# Patient Record
Sex: FEMALE | Race: WHITE | Employment: OTHER | URBAN - METROPOLITAN AREA
[De-identification: names, ages, dates, MRNs, and addresses within clinical notes are randomized per-mention and may not be internally consistent; named-entity substitution may affect disease eponyms.]

---

## 2018-04-17 PROBLEM — F41.0 PANIC: Status: ACTIVE | Noted: 2018-04-17

## 2018-04-17 PROBLEM — L82.0 SEBORRHEIC KERATOSES, INFLAMED: Status: ACTIVE | Noted: 2018-04-17

## 2018-04-17 NOTE — PROGRESS NOTES
New patient to the facility. Referred by daughter. She hails from [facility] was a real tear in that area. She is here to establish. Her main problems is that of controlled hypothyroidism and history of panic disorder.   She brings in her

## 2018-04-18 ENCOUNTER — TELEPHONE (OUTPATIENT)
Dept: FAMILY MEDICINE CLINIC | Facility: CLINIC | Age: 83
End: 2018-04-18

## 2018-04-18 DIAGNOSIS — Z12.83 SCREENING FOR MALIGNANT NEOPLASM OF SKIN: Primary | ICD-10-CM

## 2018-04-18 NOTE — TELEPHONE ENCOUNTER
.Reason for the order/referral:Skin Check   PCP:  Delonte De Leon DO  Refer to Provider: Ramakrishna Tijerina  Specialty:Dermatology   Patient Insurance: Payor: [facility] MED ADV HMO HUM / Plan: [id number] [facility] HMO / Product Type: HMO /   Has the patient been seen by their PCP f

## 2018-04-19 NOTE — TELEPHONE ENCOUNTER
Dr. Lonnie Mccann,    [place] [address] just saw this patient on 4/17/2018 for this problem.  Okay for referral?

## 2018-06-26 NOTE — PROGRESS NOTES
Second visit for this woman who has recently moved this this past April from her home in [place]. She has 2 daughters back in that facility and has one here in town. She admits she still a bit homesick.   She is concerned primarily about her desire to

## 2018-06-27 ENCOUNTER — TELEPHONE (OUTPATIENT)
Dept: FAMILY MEDICINE CLINIC | Facility: CLINIC | Age: 83
End: 2018-06-27

## 2018-06-27 NOTE — TELEPHONE ENCOUNTER
Pt had appt with CHANNING yesterday and forgot to request routine lab work as she is due.   pls advise

## 2018-07-10 ENCOUNTER — TELEPHONE (OUTPATIENT)
Dept: FAMILY MEDICINE CLINIC | Facility: CLINIC | Age: 83
End: 2018-07-10

## 2018-07-10 DIAGNOSIS — Z13.29 SCREENING FOR THYROID DISORDER: ICD-10-CM

## 2018-07-10 DIAGNOSIS — Z13.228 SCREENING FOR ENDOCRINE, METABOLIC AND IMMUNITY DISORDER: ICD-10-CM

## 2018-07-10 DIAGNOSIS — Z13.29 SCREENING FOR ENDOCRINE, METABOLIC AND IMMUNITY DISORDER: ICD-10-CM

## 2018-07-10 DIAGNOSIS — Z13.220 SCREENING FOR LIPOID DISORDERS: ICD-10-CM

## 2018-07-10 DIAGNOSIS — Z13.0 SCREENING FOR DEFICIENCY ANEMIA: Primary | ICD-10-CM

## 2018-07-10 DIAGNOSIS — Z13.21 ENCOUNTER FOR VITAMIN DEFICIENCY SCREENING: ICD-10-CM

## 2018-07-10 DIAGNOSIS — Z13.0 SCREENING FOR ENDOCRINE, METABOLIC AND IMMUNITY DISORDER: ICD-10-CM

## 2018-07-10 NOTE — TELEPHONE ENCOUNTER
Pt has and appt with Dr Bonifacio Villanueva on July 30th. She would like to have her blood work done before hand. . She would like a phone call once orders are put in, so she knows when she can come get them done.

## 2018-07-12 ENCOUNTER — OFFICE VISIT (OUTPATIENT)
Dept: FAMILY MEDICINE CLINIC | Facility: CLINIC | Age: 83
End: 2018-07-12

## 2018-07-12 VITALS
TEMPERATURE: 99 F | DIASTOLIC BLOOD PRESSURE: 76 MMHG | OXYGEN SATURATION: 98 % | BODY MASS INDEX: 26.25 KG/M2 | WEIGHT: 142.63 LBS | HEART RATE: 72 BPM | SYSTOLIC BLOOD PRESSURE: 130 MMHG | HEIGHT: 62 IN | RESPIRATION RATE: 16 BRPM

## 2018-07-12 DIAGNOSIS — J30.9 ALLERGIC RHINITIS, UNSPECIFIED SEASONALITY, UNSPECIFIED TRIGGER: Primary | ICD-10-CM

## 2018-07-12 PROCEDURE — 99213 OFFICE O/P EST LOW 20 MIN: CPT | Performed by: NURSE PRACTITIONER

## 2018-07-15 NOTE — PROGRESS NOTES
HPI:     Patient presents with: Other: off and on sore throat,chills,fatigue,swollen glads off and on sx x 4-5 days. Allergy symptoms. The patient complains of allergy symptoms. Has had for 4-5.  Symptoms include: nasal congestion,clear rhinorrhea,na pains.  LUNGS: denies shortness of breath with exertion or rest.No wheezing, no cough.   CARDIOVASCULAR: denies chest pain on exertion  GI: no nausea or abdominal pain  NEURO: no sleeping issues      EXAM:   /76 (BP Location: Right arm, Patient Positi

## 2018-07-16 NOTE — TELEPHONE ENCOUNTER
Per Juan Livingston to pre order    jg (Routing comment)    Per [facility] brittany ORELLANA to order prior to [address] ordered, pt notified.

## 2018-07-16 NOTE — TELEPHONE ENCOUNTER
Patient called and stated that she hasn't heard back regarding lab work that she requested. Please advise.

## 2018-07-19 ENCOUNTER — LABORATORY ENCOUNTER (OUTPATIENT)
Dept: LAB | Age: 83
End: 2018-07-19
Attending: FAMILY MEDICINE
Payer: MEDICARE

## 2018-07-19 DIAGNOSIS — Z13.29 SCREENING FOR ENDOCRINE, METABOLIC AND IMMUNITY DISORDER: ICD-10-CM

## 2018-07-19 DIAGNOSIS — Z13.21 ENCOUNTER FOR VITAMIN DEFICIENCY SCREENING: ICD-10-CM

## 2018-07-19 DIAGNOSIS — Z13.0 SCREENING FOR ENDOCRINE, METABOLIC AND IMMUNITY DISORDER: ICD-10-CM

## 2018-07-19 DIAGNOSIS — Z13.228 SCREENING FOR ENDOCRINE, METABOLIC AND IMMUNITY DISORDER: ICD-10-CM

## 2018-07-19 DIAGNOSIS — Z13.220 SCREENING FOR LIPOID DISORDERS: ICD-10-CM

## 2018-07-19 DIAGNOSIS — Z13.29 SCREENING FOR THYROID DISORDER: ICD-10-CM

## 2018-07-19 DIAGNOSIS — Z13.0 SCREENING FOR DEFICIENCY ANEMIA: ICD-10-CM

## 2018-07-19 LAB
ALBUMIN SERPL-MCNC: 3.5 G/DL (ref 3.5–4.8)
ALBUMIN/GLOB SERPL: 1 {RATIO} (ref 1–2)
ALP LIVER SERPL-CCNC: 67 U/L (ref 55–142)
ALT SERPL-CCNC: 27 U/L (ref 14–54)
ANION GAP SERPL CALC-SCNC: 7 MMOL/L (ref 0–18)
AST SERPL-CCNC: 19 U/L (ref 15–41)
BASOPHILS # BLD AUTO: 0.04 X10(3) UL (ref 0–0.1)
BASOPHILS NFR BLD AUTO: 0.8 %
BILIRUB SERPL-MCNC: 0.5 MG/DL (ref 0.1–2)
BUN BLD-MCNC: 12 MG/DL (ref 8–20)
BUN/CREAT SERPL: 16.4 (ref 10–20)
CALCIUM BLD-MCNC: 9.4 MG/DL (ref 8.3–10.3)
CHLORIDE SERPL-SCNC: 107 MMOL/L (ref 101–111)
CHOLEST SMN-MCNC: 228 MG/DL (ref ?–200)
CO2 SERPL-SCNC: 27 MMOL/L (ref 22–32)
CREAT BLD-MCNC: 0.73 MG/DL (ref 0.55–1.02)
EOSINOPHIL # BLD AUTO: 0.16 X10(3) UL (ref 0–0.3)
EOSINOPHIL NFR BLD AUTO: 3.3 %
ERYTHROCYTE [DISTWIDTH] IN BLOOD BY AUTOMATED COUNT: 12.6 % (ref 11.5–16)
GLOBULIN PLAS-MCNC: 3.4 G/DL (ref 2.5–3.7)
GLUCOSE BLD-MCNC: 96 MG/DL (ref 70–99)
HCT VFR BLD AUTO: 39.9 % (ref 34–50)
HDLC SERPL-MCNC: 60 MG/DL (ref 45–?)
HDLC SERPL: 3.8 {RATIO} (ref ?–4.44)
HGB BLD-MCNC: 12.9 G/DL (ref 12–16)
IMMATURE GRANULOCYTE COUNT: 0.01 X10(3) UL (ref 0–1)
IMMATURE GRANULOCYTE RATIO %: 0.2 %
LDLC SERPL CALC-MCNC: 146 MG/DL (ref ?–130)
LYMPHOCYTES # BLD AUTO: 1.4 X10(3) UL (ref 0.9–4)
LYMPHOCYTES NFR BLD AUTO: 29 %
M PROTEIN MFR SERPL ELPH: 6.9 G/DL (ref 6.1–8.3)
MCH RBC QN AUTO: 28 PG (ref 27–33.2)
MCHC RBC AUTO-ENTMCNC: 32.3 G/DL (ref 31–37)
MCV RBC AUTO: 86.7 FL (ref 81–100)
MONOCYTES # BLD AUTO: 0.35 X10(3) UL (ref 0.1–1)
MONOCYTES NFR BLD AUTO: 7.3 %
NEUTROPHIL ABS PRELIM: 2.86 X10 (3) UL (ref 1.3–6.7)
NEUTROPHILS # BLD AUTO: 2.86 X10(3) UL (ref 1.3–6.7)
NEUTROPHILS NFR BLD AUTO: 59.4 %
NONHDLC SERPL-MCNC: 168 MG/DL (ref ?–130)
OSMOLALITY SERPL CALC.SUM OF ELEC: 292 MOSM/KG (ref 275–295)
PLATELET # BLD AUTO: 188 10(3)UL (ref 150–450)
POTASSIUM SERPL-SCNC: 4.3 MMOL/L (ref 3.6–5.1)
RBC # BLD AUTO: 4.6 X10(6)UL (ref 3.8–5.1)
RED CELL DISTRIBUTION WIDTH-SD: 39.8 FL (ref 35.1–46.3)
SODIUM SERPL-SCNC: 141 MMOL/L (ref 136–144)
TRIGL SERPL-MCNC: 111 MG/DL (ref ?–150)
TSI SER-ACNC: 1.1 MIU/ML (ref 0.35–5.5)
VIT D+METAB SERPL-MCNC: 21.9 NG/ML (ref 30–100)
VLDLC SERPL CALC-MCNC: 22 MG/DL (ref 5–40)
WBC # BLD AUTO: 4.8 X10(3) UL (ref 4–13)

## 2018-07-19 PROCEDURE — 82306 VITAMIN D 25 HYDROXY: CPT

## 2018-07-19 PROCEDURE — 84443 ASSAY THYROID STIM HORMONE: CPT

## 2018-07-19 PROCEDURE — 80053 COMPREHEN METABOLIC PANEL: CPT

## 2018-07-19 PROCEDURE — 85025 COMPLETE CBC W/AUTO DIFF WBC: CPT

## 2018-07-19 PROCEDURE — 80061 LIPID PANEL: CPT

## 2018-07-19 PROCEDURE — 36415 COLL VENOUS BLD VENIPUNCTURE: CPT

## 2018-07-30 ENCOUNTER — OFFICE VISIT (OUTPATIENT)
Dept: FAMILY MEDICINE CLINIC | Facility: CLINIC | Age: 83
End: 2018-07-30

## 2018-07-30 VITALS
OXYGEN SATURATION: 97 % | HEART RATE: 92 BPM | WEIGHT: 142 LBS | HEIGHT: 62 IN | SYSTOLIC BLOOD PRESSURE: 118 MMHG | BODY MASS INDEX: 26.13 KG/M2 | RESPIRATION RATE: 16 BRPM | DIASTOLIC BLOOD PRESSURE: 80 MMHG | TEMPERATURE: 98 F

## 2018-07-30 DIAGNOSIS — H90.3 SENSORINEURAL HEARING LOSS (SNHL) OF BOTH EARS: Primary | ICD-10-CM

## 2018-07-30 PROCEDURE — 96160 PT-FOCUSED HLTH RISK ASSMT: CPT | Performed by: FAMILY MEDICINE

## 2018-07-30 PROCEDURE — G0438 PPPS, INITIAL VISIT: HCPCS | Performed by: FAMILY MEDICINE

## 2018-07-30 NOTE — PROGRESS NOTES
Presents for Medicare supervision. To be recalled that she is relatively new to our state having moved here from [place]. She had been in a 15 year relationship with a man but he passed away. Please refer to the template form.     Tuning fork was ap

## 2018-08-06 ENCOUNTER — HOSPITAL ENCOUNTER (OUTPATIENT)
Dept: GENERAL RADIOLOGY | Facility: HOSPITAL | Age: 83
Discharge: HOME | End: 2018-08-06
Attending: FAMILY MEDICINE
Payer: MEDICARE

## 2018-08-06 DIAGNOSIS — M25.551 HIP PAIN, BILATERAL: ICD-10-CM

## 2018-08-06 DIAGNOSIS — M25.552 HIP PAIN, BILATERAL: ICD-10-CM

## 2018-08-06 PROCEDURE — 73523 X-RAY EXAM HIPS BI 5/> VIEWS: CPT | Performed by: FAMILY MEDICINE

## 2018-08-06 PROCEDURE — 72190 X-RAY EXAM OF PELVIS: CPT | Performed by: FAMILY MEDICINE

## 2018-09-05 ENCOUNTER — HOSPITAL ENCOUNTER (OUTPATIENT)
Dept: GENERAL RADIOLOGY | Age: 83
Discharge: HOME | End: 2018-09-05
Attending: FAMILY MEDICINE
Payer: MEDICARE

## 2018-09-05 ENCOUNTER — OFFICE VISIT (OUTPATIENT)
Dept: FAMILY MEDICINE CLINIC | Facility: CLINIC | Age: 83
End: 2018-09-05

## 2018-09-05 VITALS
HEART RATE: 62 BPM | SYSTOLIC BLOOD PRESSURE: 116 MMHG | DIASTOLIC BLOOD PRESSURE: 80 MMHG | TEMPERATURE: 98 F | HEIGHT: 62 IN | BODY MASS INDEX: 25.76 KG/M2 | RESPIRATION RATE: 20 BRPM | WEIGHT: 140 LBS | OXYGEN SATURATION: 99 %

## 2018-09-05 DIAGNOSIS — S99.911A INJURY OF RIGHT ANKLE, INITIAL ENCOUNTER: ICD-10-CM

## 2018-09-05 DIAGNOSIS — S99.911A INJURY OF RIGHT ANKLE, INITIAL ENCOUNTER: Primary | ICD-10-CM

## 2018-09-05 PROCEDURE — 73630 X-RAY EXAM OF FOOT: CPT | Performed by: FAMILY MEDICINE

## 2018-09-05 PROCEDURE — 73610 X-RAY EXAM OF ANKLE: CPT | Performed by: FAMILY MEDICINE

## 2018-09-05 PROCEDURE — 99213 OFFICE O/P EST LOW 20 MIN: CPT | Performed by: FAMILY MEDICINE

## 2018-09-05 NOTE — PROGRESS NOTES
HPI:    Patient ID: Jos Dexter is a 80year old female. Ankle Injury    The incident occurred more than 1 week ago (8/23). Incident location: pt rolled her ankle on the side walk. The injury mechanism was an inversion injury.  The pain is present in t Comment:Throat swelling  Synalgos Dc               Ngozi [Cefaclor]           Comment:Headache  Imodium [Loperamide]      Donnatal [Belladonn*      Gantrisin [Sulfisox*      Lorabid [Loracarbef]      Monistat [Miconazol*      Sudafed [Pseudoephe* Walking boot up to the knee for right leg.            Imaging & Referrals:  XR ANKLE (MIN 3 VIEWS), RIGHT (CPT=73610)  XR FOOT, COMPLETE (MIN 3 VIEWS), RIGHT (CPT=73630)       NA#[id number]

## 2018-09-11 ENCOUNTER — OFFICE VISIT (OUTPATIENT)
Dept: FAMILY MEDICINE CLINIC | Facility: CLINIC | Age: 83
End: 2018-09-11

## 2018-09-11 VITALS
BODY MASS INDEX: 26.31 KG/M2 | TEMPERATURE: 99 F | HEART RATE: 65 BPM | RESPIRATION RATE: 16 BRPM | WEIGHT: 143 LBS | SYSTOLIC BLOOD PRESSURE: 126 MMHG | OXYGEN SATURATION: 97 % | HEIGHT: 62 IN | DIASTOLIC BLOOD PRESSURE: 78 MMHG

## 2018-09-11 DIAGNOSIS — J06.9 ACUTE URI: Primary | ICD-10-CM

## 2018-09-11 PROCEDURE — 99213 OFFICE O/P EST LOW 20 MIN: CPT | Performed by: NURSE PRACTITIONER

## 2018-09-11 RX ORDER — BENZONATATE 200 MG/1
200 CAPSULE ORAL 3 TIMES DAILY PRN
Qty: 20 CAPSULE | Refills: 0 | Status: SHIPPED | OUTPATIENT
Start: 2018-09-11 | End: 2018-09-18

## 2018-09-11 NOTE — PROGRESS NOTES
Patient presents with:  URI: cough sx x 2-3 days. HPI:   Mari Farrar is a 80year old female who presents for upper respiratory symptoms for  3  days.  Patient reports sore throat only at the beginning of sx's, congestion, dry cough, cough is keepin rest.  CARDIOVASCULAR: denies chest pain on exertion  GI: no nausea or abdominal pain      EXAM:   /78 (BP Location: Right arm, Patient Position: Sitting, Cuff Size: adult)   Pulse 65   Temp 99 °F (37.2 °C) (Oral)   Resp 16   Ht 62\"   Wt 143 lb   Sp

## 2018-10-30 ENCOUNTER — OFFICE VISIT (OUTPATIENT)
Dept: FAMILY MEDICINE CLINIC | Facility: CLINIC | Age: 83
End: 2018-10-30

## 2018-10-30 VITALS
TEMPERATURE: 98 F | RESPIRATION RATE: 16 BRPM | OXYGEN SATURATION: 98 % | WEIGHT: 145 LBS | DIASTOLIC BLOOD PRESSURE: 72 MMHG | SYSTOLIC BLOOD PRESSURE: 124 MMHG | HEIGHT: 62 IN | HEART RATE: 72 BPM | BODY MASS INDEX: 26.68 KG/M2

## 2018-10-30 DIAGNOSIS — M54.12 CERVICAL RADICULOPATHY: ICD-10-CM

## 2018-10-30 DIAGNOSIS — M54.16 LUMBAR RADICULOPATHY: Primary | ICD-10-CM

## 2018-10-30 DIAGNOSIS — L82.0 SEBORRHEIC KERATOSES, INFLAMED: ICD-10-CM

## 2018-10-30 PROCEDURE — 99213 OFFICE O/P EST LOW 20 MIN: CPT | Performed by: FAMILY MEDICINE

## 2018-10-30 NOTE — PROGRESS NOTES
Requests a referral to chiropractor recently was struck by a large commercial door to the back aggravating her chronic low back pain on today's exam however her gait is normal there is no palpable pain neurologically she is intact no bruising is noted next

## 2018-11-08 ENCOUNTER — TELEPHONE (OUTPATIENT)
Dept: FAMILY MEDICINE CLINIC | Facility: CLINIC | Age: 83
End: 2018-11-08

## 2018-11-08 NOTE — TELEPHONE ENCOUNTER
Regarding: RE: Other      ----- Message -----  From: Diogo Healy RN  Sent: 11/7/2018   2:26 PM  To: Romina Ac DO  Subject: RE: Other                                        ----- Message from Terrie Gaucher, RN sent at 11/7/2018  2:26 PM CST ---

## 2019-01-20 ENCOUNTER — HOSPITAL ENCOUNTER (INPATIENT)
Facility: HOSPITAL | Age: 84
LOS: 2 days | Discharge: HOME | DRG: 069 | End: 2019-01-22
Attending: EMERGENCY MEDICINE | Admitting: HOSPITALIST
Payer: MEDICARE

## 2019-01-20 ENCOUNTER — APPOINTMENT (OUTPATIENT)
Dept: CT IMAGING | Facility: HOSPITAL | Age: 84
DRG: 069 | End: 2019-01-20
Attending: EMERGENCY MEDICINE
Payer: MEDICARE

## 2019-01-20 DIAGNOSIS — R41.82 ALTERED MENTAL STATUS, UNSPECIFIED ALTERED MENTAL STATUS TYPE: Primary | ICD-10-CM

## 2019-01-20 DIAGNOSIS — I10 HYPERTENSION, UNSPECIFIED TYPE: ICD-10-CM

## 2019-01-20 DIAGNOSIS — I63.10 CEREBROVASCULAR ACCIDENT (CVA) DUE TO EMBOLISM OF PRECEREBRAL ARTERY (HCC): ICD-10-CM

## 2019-01-20 DIAGNOSIS — R47.01 APHASIA: ICD-10-CM

## 2019-01-20 PROBLEM — F41.9 ANXIETY: Chronic | Status: ACTIVE | Noted: 2019-01-20

## 2019-01-20 PROBLEM — E03.9 ACQUIRED HYPOTHYROIDISM: Status: ACTIVE | Noted: 2019-01-20

## 2019-01-20 LAB
ALBUMIN SERPL-MCNC: 3.5 G/DL (ref 3.1–4.5)
ALBUMIN/GLOB SERPL: 1 {RATIO} (ref 1–2)
ALP LIVER SERPL-CCNC: 67 U/L (ref 55–142)
ALT SERPL-CCNC: 22 U/L (ref 14–54)
ANION GAP SERPL CALC-SCNC: 7 MMOL/L (ref 0–18)
APTT PPP: 35.4 SECONDS (ref 26.1–34.6)
AST SERPL-CCNC: 19 U/L (ref 15–41)
BASOPHILS # BLD AUTO: 0.05 X10(3) UL (ref 0–0.1)
BASOPHILS NFR BLD AUTO: 0.9 %
BILIRUB SERPL-MCNC: 0.4 MG/DL (ref 0.1–2)
BILIRUB UR QL STRIP.AUTO: NEGATIVE
BUN BLD-MCNC: 12 MG/DL (ref 8–20)
BUN/CREAT SERPL: 16.9 (ref 10–20)
CALCIUM BLD-MCNC: 9.1 MG/DL (ref 8.3–10.3)
CHLORIDE SERPL-SCNC: 107 MMOL/L (ref 101–111)
CLARITY UR REFRACT.AUTO: CLEAR
CO2 SERPL-SCNC: 26 MMOL/L (ref 22–32)
COLOR UR AUTO: COLORLESS
CREAT BLD-MCNC: 0.71 MG/DL (ref 0.55–1.02)
EOSINOPHIL # BLD AUTO: 0.17 X10(3) UL (ref 0–0.3)
EOSINOPHIL NFR BLD AUTO: 3.1 %
ERYTHROCYTE [DISTWIDTH] IN BLOOD BY AUTOMATED COUNT: 12.2 % (ref 11.5–16)
EST. AVERAGE GLUCOSE BLD GHB EST-MCNC: 120 MG/DL (ref 68–126)
GLOBULIN PLAS-MCNC: 3.4 G/DL (ref 2.8–4.4)
GLUCOSE BLD-MCNC: 112 MG/DL (ref 70–99)
GLUCOSE BLD-MCNC: 114 MG/DL (ref 65–99)
GLUCOSE BLD-MCNC: 117 MG/DL (ref 65–99)
GLUCOSE UR STRIP.AUTO-MCNC: NEGATIVE MG/DL
HBA1C MFR BLD HPLC: 5.8 % (ref ?–5.7)
HCT VFR BLD AUTO: 39.8 % (ref 34–50)
HGB BLD-MCNC: 13.8 G/DL (ref 12–16)
IMMATURE GRANULOCYTE COUNT: 0.01 X10(3) UL (ref 0–1)
IMMATURE GRANULOCYTE RATIO %: 0.2 %
INR BLD: 0.9 (ref 0.9–1.1)
INR BLD: 0.98 (ref 0.9–1.1)
KETONES UR STRIP.AUTO-MCNC: NEGATIVE MG/DL
LYMPHOCYTES # BLD AUTO: 1.26 X10(3) UL (ref 0.9–4)
LYMPHOCYTES NFR BLD AUTO: 22.6 %
M PROTEIN MFR SERPL ELPH: 6.9 G/DL (ref 6.4–8.2)
MCH RBC QN AUTO: 29.2 PG (ref 27–33.2)
MCHC RBC AUTO-ENTMCNC: 34.7 G/DL (ref 31–37)
MCV RBC AUTO: 84.1 FL (ref 81–100)
MONOCYTES # BLD AUTO: 0.36 X10(3) UL (ref 0.1–1)
MONOCYTES NFR BLD AUTO: 6.5 %
NEUTROPHIL ABS PRELIM: 3.72 X10 (3) UL (ref 1.3–6.7)
NEUTROPHILS # BLD AUTO: 3.72 X10(3) UL (ref 1.3–6.7)
NEUTROPHILS NFR BLD AUTO: 66.7 %
NITRITE UR QL STRIP.AUTO: NEGATIVE
OSMOLALITY SERPL CALC.SUM OF ELEC: 291 MOSM/KG (ref 275–295)
PH UR STRIP.AUTO: 9 [PH] (ref 4.5–8)
PLATELET # BLD AUTO: 169 10(3)UL (ref 150–450)
POTASSIUM SERPL-SCNC: 3.7 MMOL/L (ref 3.6–5.1)
PROT UR STRIP.AUTO-MCNC: NEGATIVE MG/DL
PSA SERPL DL<=0.01 NG/ML-MCNC: 12.5 SECONDS (ref 12.4–14.7)
PSA SERPL DL<=0.01 NG/ML-MCNC: 13.4 SECONDS (ref 12.4–14.7)
RBC # BLD AUTO: 4.73 X10(6)UL (ref 3.8–5.1)
RBC UR QL AUTO: NEGATIVE
RED CELL DISTRIBUTION WIDTH-SD: 37.5 FL (ref 35.1–46.3)
SODIUM SERPL-SCNC: 140 MMOL/L (ref 136–144)
SP GR UR STRIP.AUTO: 1.01 (ref 1–1.03)
T4 FREE SERPL-MCNC: 0.9 NG/DL (ref 0.9–1.8)
TROPONIN I SERPL-MCNC: <0.046 NG/ML (ref ?–0.05)
TSI SER-ACNC: 0.93 MIU/ML (ref 0.35–5.5)
UROBILINOGEN UR STRIP.AUTO-MCNC: <2 MG/DL
WBC # BLD AUTO: 5.6 X10(3) UL (ref 4–13)

## 2019-01-20 PROCEDURE — 99223 1ST HOSP IP/OBS HIGH 75: CPT | Performed by: HOSPITALIST

## 2019-01-20 PROCEDURE — 70450 CT HEAD/BRAIN W/O DYE: CPT | Performed by: EMERGENCY MEDICINE

## 2019-01-20 PROCEDURE — 70496 CT ANGIOGRAPHY HEAD: CPT | Performed by: EMERGENCY MEDICINE

## 2019-01-20 PROCEDURE — 70498 CT ANGIOGRAPHY NECK: CPT | Performed by: EMERGENCY MEDICINE

## 2019-01-20 PROCEDURE — 0042T CT STROKE(DAWN BRAIN)CTA BRAIN/CTA NECK+PERF(CPT=70496/70498/0042T): CPT | Performed by: EMERGENCY MEDICINE

## 2019-01-20 RX ORDER — ACETAMINOPHEN 325 MG/1
650 TABLET ORAL EVERY 4 HOURS PRN
Status: DISCONTINUED | OUTPATIENT
Start: 2019-01-20 | End: 2019-01-22

## 2019-01-20 RX ORDER — HEPARIN SODIUM 5000 [USP'U]/ML
5000 INJECTION, SOLUTION INTRAVENOUS; SUBCUTANEOUS EVERY 8 HOURS SCHEDULED
Status: DISCONTINUED | OUTPATIENT
Start: 2019-01-20 | End: 2019-01-22

## 2019-01-20 RX ORDER — METOCLOPRAMIDE HYDROCHLORIDE 5 MG/ML
10 INJECTION INTRAMUSCULAR; INTRAVENOUS EVERY 8 HOURS PRN
Status: DISCONTINUED | OUTPATIENT
Start: 2019-01-20 | End: 2019-01-22

## 2019-01-20 RX ORDER — LORAZEPAM 2 MG/ML
1 INJECTION INTRAMUSCULAR ONCE
Status: COMPLETED | OUTPATIENT
Start: 2019-01-20 | End: 2019-01-20

## 2019-01-20 RX ORDER — FAMOTIDINE 20 MG/1
20 TABLET ORAL DAILY
Status: DISCONTINUED | OUTPATIENT
Start: 2019-01-20 | End: 2019-01-22

## 2019-01-20 RX ORDER — HYDRALAZINE HYDROCHLORIDE 20 MG/ML
10 INJECTION INTRAMUSCULAR; INTRAVENOUS ONCE
Status: DISCONTINUED | OUTPATIENT
Start: 2019-01-20 | End: 2019-01-22

## 2019-01-20 RX ORDER — ASPIRIN 300 MG
300 SUPPOSITORY, RECTAL RECTAL DAILY
Status: DISCONTINUED | OUTPATIENT
Start: 2019-01-20 | End: 2019-01-22

## 2019-01-20 RX ORDER — BISACODYL 10 MG
10 SUPPOSITORY, RECTAL RECTAL
Status: DISCONTINUED | OUTPATIENT
Start: 2019-01-20 | End: 2019-01-22

## 2019-01-20 RX ORDER — LORAZEPAM 2 MG/ML
0.5 INJECTION INTRAMUSCULAR EVERY 6 HOURS PRN
Status: DISCONTINUED | OUTPATIENT
Start: 2019-01-20 | End: 2019-01-22

## 2019-01-20 RX ORDER — ONDANSETRON 2 MG/ML
4 INJECTION INTRAMUSCULAR; INTRAVENOUS EVERY 6 HOURS PRN
Status: DISCONTINUED | OUTPATIENT
Start: 2019-01-20 | End: 2019-01-22

## 2019-01-20 RX ORDER — FAMOTIDINE 10 MG/ML
20 INJECTION, SOLUTION INTRAVENOUS DAILY
Status: DISCONTINUED | OUTPATIENT
Start: 2019-01-20 | End: 2019-01-22

## 2019-01-20 RX ORDER — ASPIRIN 325 MG
325 TABLET ORAL ONCE
Status: COMPLETED | OUTPATIENT
Start: 2019-01-20 | End: 2019-01-20

## 2019-01-20 RX ORDER — ASPIRIN 325 MG
325 TABLET ORAL DAILY
Status: DISCONTINUED | OUTPATIENT
Start: 2019-01-20 | End: 2019-01-22

## 2019-01-20 RX ORDER — ACETAMINOPHEN 650 MG/1
650 SUPPOSITORY RECTAL EVERY 4 HOURS PRN
Status: DISCONTINUED | OUTPATIENT
Start: 2019-01-20 | End: 2019-01-22

## 2019-01-20 RX ORDER — POLYETHYLENE GLYCOL 3350 17 G/17G
17 POWDER, FOR SOLUTION ORAL DAILY PRN
Status: DISCONTINUED | OUTPATIENT
Start: 2019-01-20 | End: 2019-01-22

## 2019-01-20 RX ORDER — SODIUM PHOSPHATE, DIBASIC AND SODIUM PHOSPHATE, MONOBASIC 7; 19 G/133ML; G/133ML
1 ENEMA RECTAL ONCE AS NEEDED
Status: DISCONTINUED | OUTPATIENT
Start: 2019-01-20 | End: 2019-01-22

## 2019-01-20 RX ORDER — ATORVASTATIN CALCIUM 80 MG/1
80 TABLET, FILM COATED ORAL NIGHTLY
Status: DISCONTINUED | OUTPATIENT
Start: 2019-01-20 | End: 2019-01-22

## 2019-01-20 RX ORDER — DOCUSATE SODIUM 100 MG/1
100 CAPSULE, LIQUID FILLED ORAL 2 TIMES DAILY
Status: DISCONTINUED | OUTPATIENT
Start: 2019-01-20 | End: 2019-01-22

## 2019-01-20 RX ORDER — SENNOSIDES 8.6 MG
17.2 TABLET ORAL NIGHTLY
Status: DISCONTINUED | OUTPATIENT
Start: 2019-01-20 | End: 2019-01-22

## 2019-01-20 RX ORDER — POTASSIUM CHLORIDE 20 MEQ/1
40 TABLET, EXTENDED RELEASE ORAL ONCE
Status: COMPLETED | OUTPATIENT
Start: 2019-01-20 | End: 2019-01-20

## 2019-01-20 RX ORDER — LABETALOL HYDROCHLORIDE 5 MG/ML
10 INJECTION, SOLUTION INTRAVENOUS EVERY 10 MIN PRN
Status: DISCONTINUED | OUTPATIENT
Start: 2019-01-20 | End: 2019-01-22

## 2019-01-20 RX ORDER — SODIUM CHLORIDE 9 MG/ML
INJECTION, SOLUTION INTRAVENOUS CONTINUOUS
Status: ACTIVE | OUTPATIENT
Start: 2019-01-20 | End: 2019-01-22

## 2019-01-20 NOTE — H&P
[facility]  History and Physical     Osmanywill Sotelo Patient Status:  Emergency    1935 MRN [id number]   Location [address] [facility] Attending Arianne Arriaga MD   Hosp Day # 0 PCP Dharmesh Mares DO     Chief Complaint: A Latex                     Mayonaise                 Medrol [Methylpredn*      Olive Oil                 Paxil [Paroxetine]        Pyridin [Phenazopyr*      Sulfamethoxazole            Comment:Throat swelling  Synalgos Dc               Ngozi [Cefaclor] completed. Pertinent positives and negatives noted in the HPI.     Physical Exam:    /81   Pulse 96   Temp 98 °F (36.7 °C) (Temporal)   Resp 22   Ht 5' 5\" (1.651 m)   Wt 145 lb 1 oz (65.8 kg)   SpO2 99%   BMI 24.14 kg/m²   General: No acute distre medication for now. If patient is going to be nothing by mouth for prolonged will switch to IV formulation. Otherwise will restart once passes swallow evaluation. 3. Anxiety-we will give so IV Ativan as needed.   4. Depression-we will restart her Lexapro

## 2019-01-20 NOTE — ED INITIAL ASSESSMENT (HPI)
Patient arrives from home, independent living area, by ems. Unsure as to why patient called ems. Patient confused but alert, having a difficult time answering questions but following simple commands.  Tearful and anxious upon arrival. Unsure of last known n

## 2019-01-20 NOTE — ED NOTES
Patient able to sit up and drink water, passed dysphagia screen and tolerated oral aspirin dose. Daughter remains at bedside. Patient continues to follow simple commands but still having trouble answering questions appropriately and forming words.  Patient

## 2019-01-20 NOTE — ED NOTES
Patient remains calm at this time, resting with eyes closed. Family member at bedside. No distress observed. When awake patient still trying to form words and having a hard time. Continues to move all extremities. Observed turning herself over in bed.

## 2019-01-20 NOTE — ED NOTES
Patient waiting for transport at this time. Daughter remains at bedside. Patient resting with eyes closed under warm blankets. Will respond to name and follow simple commands, continues to have difficult time answering questions and verbalizing.  Difficult

## 2019-01-20 NOTE — PROGRESS NOTES
CODE STROKE NOTE:    Responded to Code Stroke paged in ED C1 at 635.    80year old female presents to ED via EMS for AMS and inability to speak.     Per EMS when they arrived on the scene, patient was able to open her door to let them in but was unable t

## 2019-01-20 NOTE — ED NOTES
Patient calm at this time, resting comfortably on her side. Family remains at bedside. VSS. BP stable at this time, will hold hydralazine per MD and continue to monitor. Patient has been assisted onto bedpan and urine specimen collected.

## 2019-01-20 NOTE — ED NOTES
Patient taken to CT department at this time by cart by RN with monitor. [facility] in progress.

## 2019-01-20 NOTE — ED NOTES
Patient and family members remain updated with results and plan of care. Plan for admission. Will continue to monitor VS closely. Slightly calmer at this time, still observed shaking intermittently but anxiety decreasing slightly.  Still laying on side comf

## 2019-01-20 NOTE — ED PROVIDER NOTES
Patient Seen in: [facility] Emergency Department    History   Patient presents with:  Fatigue (constitutional, neurologic)  Altered Mental Status (neurologic)    Stated Complaint: AMS. weakness    HPI    61-year-old female brought in by EMS.   EMS re clear, mucous membranes moist   Neck: supple, no rigidity   Lungs: good air exchange and clear   Heart: regular rate rhythm and no murmur   Abdomen: Soft and nontender. No abdominal masses.   No peritoneal signs   Extremities: no edema, normal peripheral p the individual orders. RAINBOW DRAW BLUE   RAINBOW DRAW LAVENDER   RAINBOW DRAW LIGHT GREEN   RAINBOW DRAW GOLD   URINE CULTURE, ROUTINE   CBC W/ DIFFERENTIAL     EKG    Rate, intervals and axes as noted on EKG Report.   Rate: 90  Rhythm: Sinus Rhythm  Re axial sections were obtained per stroke protocol. Arterial and venous structures were selected for purposes of brain perfusion mapping. Dose reduction techniques were used.  Dose information is transmitted to the  [facility]  of Radiology) Pavan Buckner 35 (N blood volume protocol. CONCLUSION:  No perfusion defect identified. Unless there is contraindication to MRI, consider MRI to determine if there is infarct present, as sometimes this can be detected by MRI, but not evident on a CT perfusion study.     Dic sections were obtained per stroke protocol. Arterial and venous structures were selected for purposes of brain perfusion mapping. Dose reduction techniques were used.  Dose information is transmitted to the [facility]  of Radiology) Pavan Aguilar work.          Disposition and Plan     Clinical Impression:  Altered mental status, unspecified altered mental status type  (primary encounter diagnosis)  Hypertension, unspecified type  Aphasia    Disposition:  Admit  1/20/2019  2:37 pm    Follow-up:  No

## 2019-01-20 NOTE — ED NOTES
Patient has returned from [address] with this RN. Daughter at bedside. Patient sometimes able to follow simple commands but not consistent with her responses.  Will be heard saying she is hungry, speaking clearly intermittently but unable to identify objects appro

## 2019-01-20 NOTE — ED NOTES
Spoke with pts daughter. Daughter spoke with mother 1hr ago, mother was [place] difficulty speaking\" at that time. Pt called 911 after conversation. Daughter states last spoke to mother and all was \"normal\" last night. MD notified.

## 2019-01-21 ENCOUNTER — APPOINTMENT (OUTPATIENT)
Dept: CV DIAGNOSTICS | Facility: HOSPITAL | Age: 84
DRG: 069 | End: 2019-01-21
Attending: HOSPITALIST
Payer: MEDICARE

## 2019-01-21 LAB
ATRIAL RATE: 90 BPM
CHOLEST SMN-MCNC: 220 MG/DL (ref ?–200)
GLUCOSE BLD-MCNC: 100 MG/DL (ref 65–99)
GLUCOSE BLD-MCNC: 110 MG/DL (ref 65–99)
GLUCOSE BLD-MCNC: 86 MG/DL (ref 65–99)
GLUCOSE BLD-MCNC: 99 MG/DL (ref 65–99)
HDLC SERPL-MCNC: 54 MG/DL (ref 40–59)
LDLC SERPL CALC-MCNC: 136 MG/DL (ref ?–100)
NONHDLC SERPL-MCNC: 166 MG/DL (ref ?–130)
P AXIS: 69 DEGREES
P-R INTERVAL: 148 MS
POTASSIUM SERPL-SCNC: 3.7 MMOL/L (ref 3.6–5.1)
POTASSIUM SERPL-SCNC: 4.6 MMOL/L (ref 3.6–5.1)
Q-T INTERVAL: 366 MS
QRS DURATION: 78 MS
QTC CALCULATION (BEZET): 447 MS
R AXIS: 18 DEGREES
T AXIS: 23 DEGREES
TRIGL SERPL-MCNC: 150 MG/DL (ref 30–149)
VENTRICULAR RATE: 90 BPM
VLDLC SERPL CALC-MCNC: 30 MG/DL (ref 0–30)

## 2019-01-21 PROCEDURE — 93306 TTE W/DOPPLER COMPLETE: CPT | Performed by: HOSPITALIST

## 2019-01-21 PROCEDURE — 99232 SBSQ HOSP IP/OBS MODERATE 35: CPT | Performed by: HOSPITALIST

## 2019-01-21 PROCEDURE — 95816 EEG AWAKE AND DROWSY: CPT | Performed by: OTHER

## 2019-01-21 PROCEDURE — 99223 1ST HOSP IP/OBS HIGH 75: CPT | Performed by: OTHER

## 2019-01-21 RX ORDER — LEVOTHYROXINE SODIUM 0.05 MG/1
50 TABLET ORAL
Status: DISCONTINUED | OUTPATIENT
Start: 2019-01-21 | End: 2019-01-22

## 2019-01-21 RX ORDER — ESCITALOPRAM OXALATE 10 MG/1
10 TABLET ORAL EVERY MORNING
Status: DISCONTINUED | OUTPATIENT
Start: 2019-01-21 | End: 2019-01-22

## 2019-01-21 RX ORDER — POTASSIUM CHLORIDE 20 MEQ/1
40 TABLET, EXTENDED RELEASE ORAL ONCE
Status: COMPLETED | OUTPATIENT
Start: 2019-01-21 | End: 2019-01-21

## 2019-01-21 NOTE — CONSULTS
[id number] [facility] Consultation    Date of consult: 1/21/2019    Reason for consult: AMS, speech difficulty    HPI: Sherice Coyle is a 80year old female with past medical history of anxiety and hypothyroidism presents emergency room with [facility] (DULCOLAX) rectal suppository 10 mg 10 mg Rectal Daily PRN   FLEET ENEMA (FLEET) 7-19 GM/118ML enema 133 mL 1 enema Rectal Once PRN   ondansetron HCl (ZOFRAN) injection 4 mg 4 mg Intravenous Q6H PRN   Or      Metoclopramide HCl (REGLAN) injection 10 mg 10 Relation Age of Onset   • Heart Disorder Father    • Stroke Mother    • Other (Down's Syndrome) Sister       Physical Examination:  /64 (BP Location: Right arm)   Pulse 97   Temp 98.2 °F (36.8 °C) (Oral)   Resp 18   Ht 65\"   Wt 145 lb 1 oz   SpO2 98 events for a better cerebral perfusion   mg  Cont liptor  EEG  PT/OT/speech  I will f/u and further recommendations to follow. I explained to pt at length re: assessment and management plan, pt verbalized understanding.     Doug Velazquez,

## 2019-01-21 NOTE — PHYSICAL THERAPY NOTE
PHYSICAL THERAPY EVALUATION - INPATIENT     Room Number: 6074/1319-A  Evaluation Date: 1/21/2019  Type of Evaluation: Initial  Physician Order: PT Eval and Treat    Presenting Problem: AMS, r/o CVA  Reason for Therapy: Mobility Dysfunction and Discha ASSESSMENT  Upper extremity ROM and strength are within functional limits     Lower extremity ROM is within functional limits     Lower extremity strength is within functional limits     BALANCE  Static Sitting: Fair +  Dynamic Sitting: Fair  Static Standi in chair;Needs met;Call light within reach;RN aware of session/findings; All patient questions and concerns addressed;SCDs in place; Alarm set    ASSESSMENT   Patient is a 80year old female admitted on 1/20/2019 for AMS, aphasia. Per Neuro, likely TIA.  Per

## 2019-01-21 NOTE — OCCUPATIONAL THERAPY NOTE
OCCUPATIONAL THERAPY EVALUATION - INPATIENT     Room Number: 7433/2974-E  Evaluation Date: 1/21/2019  Type of Evaluation: Initial  Presenting Problem: aphasia    Physician Order: IP Consult to Occupational Therapy  Reason for Therapy: ADL/IADL Dysfunction repetition  Initiation: cues to initiate tasks  Awareness of Deficits:  decreased awareness of deficits  Problem Solving:  assistance required to generate solutions and assistance required to implement solutions    VISION  Current Vision: no visual deficit ASSESSMENT  Supine to Sit : Modified independent  Sit to Stand: Supervision    Skilled Therapy Provided: modified independent with supine to sit. Fine motor, visual perception intact. Increased time needed with word finding at times.   2-3 verbal cueing pr ability to return safely to her prior level of function.     Patient Complexity  Occupational Profile/Medical History LOW - Brief history including review of medical or therapy records    Specific performance deficits impacting engagement in ADL/IADL LOW  1

## 2019-01-21 NOTE — PROGRESS NOTES
[facility]  Progress Note     Aleksey Wright Patient Status:  Inpatient    1935 MRN [id number]   [facility] 7NE-A Attending [facility] PCP Virgen Friday, DO     Chief Complaint: AMS    S: Patient Epic.    Medications:   • hydrALAzine HCl  10 mg Intravenous Once   • atorvastatin  80 mg Oral Nightly   • aspirin  300 mg Rectal Daily    Or   • aspirin  325 mg Oral Daily   • Senna  17.2 mg Oral Nightly   • docusate sodium  100 mg Oral BID   • famoTIDine

## 2019-01-21 NOTE — OCCUPATIONAL THERAPY NOTE
Per stroke protocol and stroke committee recommendation, patient is on bedrest for 24 hrs from ADT time of 11:30 on 1/20. OT will evaluate the patient once activity level is upgraded.

## 2019-01-21 NOTE — PLAN OF CARE
A/Ox3, disoriented to year. Mild aphasia. No other neuro deficits noted. Reported improvement from yesterday. EEG completed. On RA, NSR/ST per tele. No c/o pain. NS infusing @ 75mL. Adequate urine output, good appetite. Will con to monitor.

## 2019-01-21 NOTE — PLAN OF CARE
NURSING ADMISSION NOTE      Patient admitted via Cart  Oriented to room. Safety precautions initiated. Bed in low position. Call light in reach. Admission navigator completed with daughter Kathleen Sarmiento over phone, pt was unable to answer questions.  Mitra Santacruz

## 2019-01-21 NOTE — PAYOR COMM NOTE
--------------  ADMISSION REVIEW     PayorSocorro Pallas MA O  Subscriber #:  [id number]  Authorization Number: [id number]    Admit date: 1/20/19  Admit time: 1628         Patient Seen in: [facility] Emergency Department    History   Patient presents with: PTT, ACTIVATED - Abnormal; Notable for the following components:    PTT 35.4 (*)     All other components within normal limits   URINALYSIS WITH CULTURE REFLEX - Abnormal; Notable for the following components:    Urine Color Colorless (*)     pH Urine 9. a CT perfusion study. Dictated by: Brisa Herrera MD on 1/20/2019 at 12:16     Approved by: Brisa Herrera MD                MDM   69-year-old female presents with expressive aphasia. Last known well, per daughter, was roughly 19 hours ago.   Daughter Route User    1/20/2019 1806 Given 5000 Units Subcutaneous (Left Lower Abdomen) Vanita Valderrama, RN      Potassium Chloride ER (K-DUR M20) CR tab 40 mEq     Date Action Dose Route User    1/20/2019 2206 Given 40 mEq Oral Leatha Girard      Potassium Chl

## 2019-01-21 NOTE — PLAN OF CARE
Assumed care @ 1900. NSR on tele, RA, VSS. AOx1-2, to self and place. Neuros q2h x 8h, then q4h x 48h. Speech is clear but expressive aphasia still present. Patient c/o headache and ran a low-grade temp. Given PO Tylenol with relief. Updated on POC.

## 2019-01-21 NOTE — SLP NOTE
ADULT SWALLOWING EVALUATION    ASSESSMENT    ASSESSMENT/OVERALL IMPRESSION:  Bedside swallow evaluation completed per CVA protocol. Per chart, pt admitted with difficulty speaking.  Per RN, pt's symptoms have resolved today, and also pt is tolerating a regu Thyroid disease        Prior Living Situation: Home alone  Diet Prior to Admission: Regular; Thin liquids       Patient/Family Goals: none stated    SWALLOWING HISTORY  Current Diet Consistency: Regular; Thin liquids  Dysphagia History: none evident in chart

## 2019-01-21 NOTE — PROGRESS NOTES
01/21/19 1517   Clinical Encounter Type   Visited With Patient   Patient's Supportive Strategies/Resources Patient grew up Restorationism/found support with the [facility] Science/recently moved to [place] to care for her daughter.    Buddhist Encou

## 2019-01-21 NOTE — PROCEDURES
Date of Procedure: 1/21/2019    Procedure: EEG (ELECTROENCEPHALOGRAM)     DX: AMS, APHASIA  HX: PT IS A 81 YO FEMALE WHO PRESENTED TO THE ED ON 1/20/2019 FOR AMS AND EXPRESSIVE APHASIA.  PT STATES THAT YESTERDAY MORNING SHE HAD RIGHT EYE VISION DISTURBANCE

## 2019-01-22 VITALS
SYSTOLIC BLOOD PRESSURE: 135 MMHG | OXYGEN SATURATION: 98 % | WEIGHT: 145.06 LBS | DIASTOLIC BLOOD PRESSURE: 55 MMHG | RESPIRATION RATE: 18 BRPM | HEIGHT: 65 IN | TEMPERATURE: 98 F | HEART RATE: 65 BPM | BODY MASS INDEX: 24.17 KG/M2

## 2019-01-22 LAB
GLUCOSE BLD-MCNC: 122 MG/DL (ref 65–99)
GLUCOSE BLD-MCNC: 93 MG/DL (ref 65–99)
POTASSIUM SERPL-SCNC: 3.9 MMOL/L (ref 3.6–5.1)

## 2019-01-22 PROCEDURE — 99233 SBSQ HOSP IP/OBS HIGH 50: CPT | Performed by: OTHER

## 2019-01-22 PROCEDURE — 99239 HOSP IP/OBS DSCHRG MGMT >30: CPT | Performed by: HOSPITALIST

## 2019-01-22 RX ORDER — ASPIRIN 325 MG
325 TABLET ORAL DAILY
Qty: 30 TABLET | Refills: 0 | Status: SHIPPED | OUTPATIENT
Start: 2019-01-23 | End: 2019-01-24

## 2019-01-22 RX ORDER — ATORVASTATIN CALCIUM 40 MG/1
40 TABLET, FILM COATED ORAL NIGHTLY
Qty: 30 TABLET | Refills: 0 | Status: SHIPPED | OUTPATIENT
Start: 2019-01-22 | End: 2019-01-24

## 2019-01-22 NOTE — OCCUPATIONAL THERAPY NOTE
OCCUPATIONAL THERAPY TREATMENT NOTE - INPATIENT     Room Number: 2243/2036-K  Session: 1   Number of Visits to Meet Established Goals: 3    Presenting Problem: aphasia    History related to current admission: Pt was admitted from home on 1/20 with altered 57.54  CMS Modifier (G-Code): CH    FUNCTIONAL TRANSFER ASSESSMENT  Supine to Sit : Modified independent  Sit to Stand: Modified independent    Skilled Therapy Provided: Pt performed sit to stand and functional mobility in bathroom Ind including toilet tra AROM HEP (home exercise program). - d/c goal, no UE deficits     Additional Goals:  Complete cognition screening.

## 2019-01-22 NOTE — SLP NOTE
SPEECH/LANGUAGE/COGNITIVE EVALUATION - INPATIENT    Admission Date: 1/20/2019  Evaluation Date: 01/22/19    Reason for Referral: Stroke protocol    ASSESSMENT & PLAN   ASSESSMENT & IMPRESSION  Pt seen this AM for cognitive communication evaluation per CVA No CT features for acute infarct. Patient/Family Goals: To go home    Patient, family and/or caregiver has been informed and has taken part in this evaluation and plan of treatment and have been advised and agree on the findings and goals.       FOLLOW

## 2019-01-22 NOTE — PROGRESS NOTES
[facility]  Progress Note     Becca Graves Patient Status:  Inpatient    1935 MRN [id number]   [facility] 7NE-A Attending [facility] Day [facility] 2 PCP Radha Jacques DO     Chief Complaint: AMS    S: Patient 1135   TROP  <0.046            Imaging: Imaging data reviewed in Epic.     Medications:   • escitalopram  10 mg Oral QAM   • Levothyroxine Sodium  50 mcg Oral Before breakfast   • hydrALAzine HCl  10 mg Intravenous Once   • atorvastatin  80 mg Oral Nightly

## 2019-01-22 NOTE — PROGRESS NOTES
[id number] [facility] Progress Note    Tacho Dwyer Patient Status:  Inpatient    1935 MRN [id number]   [facility] 7NE-A Attending Marsha Blum MD   [facility] Day # 2 PCP Lynn Wei DO         Subjective:  Tacho Christiansenheron is Active Problem List:     Seborrheic keratoses, inflamed     Panic     Altered mental status     Acquired hypothyroidism     Anxiety     Altered mental status, unspecified altered mental status type     Hypertension, unspecified type     Aphasia     Cerebro currently  Anxiety  Depression  Hypothyroidism  Hyperlipidemia     Recommendations:   mg  Cont liptor  Neuro work up completed, outpt follow up per TIA/stroke protocol  I explained to pt at length re: assessment and management plan, pt verbalized un

## 2019-01-22 NOTE — PLAN OF CARE
Assumed care @ 1900. AOx4, NSR/ST on tele, RA, VSS. Patient's sentences starting to make more sense. Aphasia seems to have improved. Denies pain/discomfort. Updated on POC. Call light within reach, needs attended to. Will continue to monitor.      CA

## 2019-01-23 ENCOUNTER — PATIENT OUTREACH (OUTPATIENT)
Dept: CASE MANAGEMENT | Age: 84
End: 2019-01-23

## 2019-01-23 DIAGNOSIS — Z02.9 ENCOUNTERS FOR UNSPECIFIED ADMINISTRATIVE PURPOSE: ICD-10-CM

## 2019-01-23 DIAGNOSIS — R41.82 ALTERED MENTAL STATUS, UNSPECIFIED ALTERED MENTAL STATUS TYPE: ICD-10-CM

## 2019-01-23 DIAGNOSIS — R47.01 APHASIA: ICD-10-CM

## 2019-01-23 PROCEDURE — 1111F DSCHRG MED/CURRENT MED MERGE: CPT

## 2019-01-23 NOTE — DISCHARGE SUMMARY
[facility] [place] [facility]  DISCHARGE SUMMARY     Franky Paris Patient Status:  Inpatient    1935 MRN [id number]   [facility] 7NE-A Attending No att. providers found   Hosp Day # 2 PCP Jason Rausch DO     Date of Admission: 2019  Phil • None    Incidental or significant findings and recommendations (brief descriptions):  • None    Lab/Test results pending at Discharge:   · None    Consultants:  • Dr. Freedom Velazquez    Discharge Medication List:     Discharge Medications      START taking these Refills:  0     RED YEAST RICE OR  Notes to patient:  Take home medication as previously prescribed      Take by mouth. Refills:  0     SUPER C COMPLEX OR  Notes to patient:  Take home medication as previously prescribed      Take by mouth.    Refills:  0 gallops. Abdomen: Soft, nontender, nondistended. Positive bowel sounds. No rebound or guarding. Neurologic: No focal neurological deficits. Musculoskeletal: Moves all extremities. Extremities: No edema.   ---------------------------------------------

## 2019-01-23 NOTE — PLAN OF CARE
Assumed patient care @ 0700. Patient AOx4. VSS, NSR/ST on tele, RA. Aphasia has improved. NIH of 0. Denies pain/discomfort. Okay to discharge per Rita Díaz Neuro NP  Updated on POC.      Call light within reach and monitored throughout shif

## 2019-01-23 NOTE — PROGRESS NOTES
Initial Post Discharge Follow Up   Discharge Date: 1/22/19  Contact Date: 1/23/2019    Consent Verification:  Assessment Completed With: Patient  HIPAA Verified?   Yes    Discharge Dx:    TIA    General:   • How have you been since your discharge from th Rfl:    Vitamin B-12 1000 MCG Oral Tab Take 1,000 mcg by mouth daily. Disp:  Rfl:    DHA-EPA-GEOVANY PRIM OIL-VIT E OR Take by mouth. Disp:  Rfl:    Calcium-Magnesium (GENEVA/MAG CITRATE OR) Take by mouth. Disp:  Rfl:    Coenzyme Q10 (COQ10 OR) Take by mouth.  Dis [address] [place] [address] ([address])    Please come 15 minutes early to fill out paperwork. Also, bring all your Medications.     Feb 06, 2019  2:40 PM CST Follow up with Yina Wong MD [facility], [address] [facility]

## 2019-01-23 NOTE — PLAN OF CARE
NURSING DISCHARGE NOTE    Patient okay to discharge per all consults. Follow-ups scheduled. Stroke education given. Discharge paperwork given and reviewed, all questions answered patient verbalized understanding.  Patient's son-in-law present at [facility]

## 2019-01-24 ENCOUNTER — OFFICE VISIT (OUTPATIENT)
Dept: FAMILY MEDICINE CLINIC | Facility: CLINIC | Age: 84
End: 2019-01-24
Payer: MEDICARE

## 2019-01-24 VITALS
HEIGHT: 62 IN | BODY MASS INDEX: 26.13 KG/M2 | WEIGHT: 142 LBS | RESPIRATION RATE: 16 BRPM | SYSTOLIC BLOOD PRESSURE: 148 MMHG | HEART RATE: 80 BPM | TEMPERATURE: 98 F | DIASTOLIC BLOOD PRESSURE: 78 MMHG

## 2019-01-24 DIAGNOSIS — G45.9 BRAIN TIA: Primary | ICD-10-CM

## 2019-01-24 PROCEDURE — 99496 TRANSJ CARE MGMT HIGH F2F 7D: CPT | Performed by: FAMILY MEDICINE

## 2019-01-24 RX ORDER — ATORVASTATIN CALCIUM 10 MG/1
10 TABLET, FILM COATED ORAL NIGHTLY
Qty: 90 TABLET | Refills: 3 | Status: SHIPPED | OUTPATIENT
Start: 2019-01-24 | End: 2019-02-11

## 2019-01-24 NOTE — PROGRESS NOTES
I am here today to serve as a TCM physician coordinator. Patient recently had a stroke/TIA rendering her completely amnesic for the event but thankfully running her free of motor or sensory deficits. This is her first such event.     I took the time to re

## 2019-01-25 ENCOUNTER — TELEPHONE (OUTPATIENT)
Dept: FAMILY MEDICINE CLINIC | Facility: CLINIC | Age: 84
End: 2019-01-25

## 2019-01-25 NOTE — TELEPHONE ENCOUNTER
Reviewed OV with JUANA, advised JG pt was prescribed 325mg daily ASA and pt reported \"nausea that lasted a very long time\", but no vomiting. Neuro appointment 2-6-19 Dr. Grover Foote. Per JUANA: have pt try taking the ASA 325mg with Pepcid AC OTC.  Report back to off

## 2019-01-27 NOTE — PAYOR COMM NOTE
--------------  DISCHARGE REVIEW    [facility]  Subscriber #:  [id number]  Authorization Number: [id number]    Admit date: 1/20/19  Admit time:  1628  Discharge Date: 1/22/2019  6:47 PM     Admitting Physician: DO Corrina Muñoz chest pain or shortness of breath. No headache. No numbness or tingling extremities. Patient moving all extremities. Patient apparently was quite anxious while getting a CT scan which is supposedly not characteristic for her.     Brief Synopsis: Patient escitalopram 10 MG Tabs  Commonly known as:  LEXAPRO  Notes to patient:  Take home medication as previously prescribed      Take by mouth.    Refills:  0     [facility]  Notes to patient:  Take home medication as previously prescribed      W Aljeandra Hooper,                1/28/2019  1:30 PM  EXAM - ESTABLISHED PATIENT [5004] 30 min. [address], DO    Patient Instructions:      Please come 15 minutes early to fill out paperwork.  Also, bring a

## 2019-01-28 ENCOUNTER — OFFICE VISIT (OUTPATIENT)
Dept: FAMILY MEDICINE CLINIC | Facility: CLINIC | Age: 84
End: 2019-01-28
Payer: MEDICARE

## 2019-01-28 VITALS
HEIGHT: 62 IN | TEMPERATURE: 98 F | DIASTOLIC BLOOD PRESSURE: 78 MMHG | SYSTOLIC BLOOD PRESSURE: 132 MMHG | HEART RATE: 84 BPM | OXYGEN SATURATION: 97 % | WEIGHT: 144 LBS | RESPIRATION RATE: 16 BRPM | BODY MASS INDEX: 26.5 KG/M2

## 2019-01-28 DIAGNOSIS — I63.10 CEREBROVASCULAR ACCIDENT (CVA) DUE TO EMBOLISM OF PRECEREBRAL ARTERY (HCC): Primary | ICD-10-CM

## 2019-01-28 DIAGNOSIS — H91.93 BILATERAL HEARING LOSS, UNSPECIFIED HEARING LOSS TYPE: ICD-10-CM

## 2019-01-28 DIAGNOSIS — I10 ESSENTIAL HYPERTENSION: ICD-10-CM

## 2019-01-28 PROCEDURE — 99213 OFFICE O/P EST LOW 20 MIN: CPT | Performed by: FAMILY MEDICINE

## 2019-01-28 RX ORDER — LOSARTAN POTASSIUM 25 MG/1
25 TABLET ORAL DAILY
Qty: 30 TABLET | Refills: 0 | Status: SHIPPED | OUTPATIENT
Start: 2019-01-28 | End: 2019-02-11

## 2019-01-28 NOTE — PROGRESS NOTES
HPI:   Manpreet Oquendo is a 80year old female who presents for TIA, BP and cholesterol follow up     Pt has a multitude of allergies  Pt tolerating the statin fine at the lower dose  Pt is taking otc aspirin ; pt worried about too high of a dose  Home bp ha vision or double vision  HEENT: denies nasal congestion, sinus pain or ST  LUNGS: denies shortness of breath with exertion  CARDIOVASCULAR: denies chest pain on exertion  MUSCULOSKELETAL: denies back pain  NEURO: denies headaches  PSYCHE: denies depression

## 2019-02-04 ENCOUNTER — TELEPHONE (OUTPATIENT)
Dept: FAMILY MEDICINE CLINIC | Facility: CLINIC | Age: 84
End: 2019-02-04

## 2019-02-04 DIAGNOSIS — R53.1 WEAKNESS: Primary | ICD-10-CM

## 2019-02-04 NOTE — TELEPHONE ENCOUNTER
.Reason for the order/referral:Inpatient follow up   PCP:  Giorgio Pa DO  Refer to Provider: Kevin Rueda   Specialty:Neurology   Patient Insurance: Payor: "[employer]" / Plan: Juliocesar Haji MA HMO / Product Type: Medicare /   Has the patient been seen by t

## 2019-02-05 NOTE — TELEPHONE ENCOUNTER
Pt called back checking on the status of the referral for DR. Shane Keith, pt has the appt tomorrow, pt is asking if she should reschedule appt? Please call pt and advise.

## 2019-02-11 NOTE — PROGRESS NOTES
HPI:   Puma Oconnor is a 80year old female who presents for TIA, BP and cholesterol follow up   New c/o - depression follow up     Pt feeling better after stopping the cholesterol meds; pt reports since she stopped the med she has not been nauseated and • Stroke Mother    • Other (Down's Syndrome) Sister       Social History:   Social History    Tobacco Use      Smoking status: Never Smoker      Smokeless tobacco: Never Used    Alcohol use: No    Drug use: No    Occ: lives alone    Children: 3. 1    3. Anxiety    - escitalopram 10 MG Oral Tab; Take 1 tablet (10 mg total) by mouth daily. Dispense: 90 tablet; Refill: 1    4. Major depressive disorder in remission, unspecified whether recurrent (HCC)    - escitalopram 10 MG Oral Tab;  Take 1 tablet

## 2019-02-13 ENCOUNTER — OFFICE VISIT (OUTPATIENT)
Dept: NEUROLOGY | Facility: CLINIC | Age: 84
End: 2019-02-13
Payer: MEDICARE

## 2019-02-13 VITALS
DIASTOLIC BLOOD PRESSURE: 72 MMHG | WEIGHT: 144 LBS | SYSTOLIC BLOOD PRESSURE: 128 MMHG | BODY MASS INDEX: 26 KG/M2 | RESPIRATION RATE: 18 BRPM | HEART RATE: 76 BPM

## 2019-02-13 DIAGNOSIS — G45.9 TIA (TRANSIENT ISCHEMIC ATTACK): ICD-10-CM

## 2019-02-13 DIAGNOSIS — R26.9 GAIT DISORDER: Primary | ICD-10-CM

## 2019-02-13 PROCEDURE — 99214 OFFICE O/P EST MOD 30 MIN: CPT | Performed by: OTHER

## 2019-02-13 RX ORDER — ASPIRIN 325 MG
325 TABLET, DELAYED RELEASE (ENTERIC COATED) ORAL DAILY
Qty: 90 TABLET | Refills: 3 | Status: SHIPPED | OUTPATIENT
Start: 2019-02-13 | End: 2019-07-15

## 2019-02-13 RX ORDER — LEVOTHYROXINE SODIUM 0.05 MG/1
50 TABLET ORAL
COMMUNITY
End: 2019-04-19

## 2019-02-13 NOTE — PROGRESS NOTES
The patient was in the hospital on 01/20/19 for a TIA. The patient states she is having trouble with word finding.

## 2019-02-14 ENCOUNTER — TELEPHONE (OUTPATIENT)
Dept: NEUROLOGY | Facility: CLINIC | Age: 84
End: 2019-02-14

## 2019-02-14 DIAGNOSIS — R26.9 GAIT DISORDER: Primary | ICD-10-CM

## 2019-02-14 NOTE — TELEPHONE ENCOUNTER
PT order reordered for referral. Is showing as open currently. Spoke with Lacy Arana at PT, they have to wait until the referral status changes to \"pending review\" by insurance. She was unsure how long this typically takes.  Informed her our office will set re

## 2019-02-16 ENCOUNTER — OFFICE VISIT (OUTPATIENT)
Dept: FAMILY MEDICINE CLINIC | Facility: CLINIC | Age: 84
End: 2019-02-16
Payer: MEDICARE

## 2019-02-16 VITALS — DIASTOLIC BLOOD PRESSURE: 76 MMHG | SYSTOLIC BLOOD PRESSURE: 127 MMHG

## 2019-02-16 DIAGNOSIS — Z01.30 BLOOD PRESSURE CHECK: Primary | ICD-10-CM

## 2019-02-18 NOTE — TELEPHONE ENCOUNTER
Per Epic review, patient's PT has been authorized. Called patient and informed her that outpatient PT should be able to see that referral has been authorized. Patient denies any further needs at this time.  Encouraged patient to call office for any question

## 2019-02-20 ENCOUNTER — HOSPITAL ENCOUNTER (EMERGENCY)
Facility: HOSPITAL | Age: 84
Discharge: HOME | End: 2019-02-20
Attending: EMERGENCY MEDICINE
Payer: MEDICARE

## 2019-02-20 ENCOUNTER — TELEPHONE (OUTPATIENT)
Dept: NEUROLOGY | Facility: CLINIC | Age: 84
End: 2019-02-20

## 2019-02-20 VITALS
HEIGHT: 62 IN | HEART RATE: 79 BPM | OXYGEN SATURATION: 96 % | BODY MASS INDEX: 25.76 KG/M2 | DIASTOLIC BLOOD PRESSURE: 73 MMHG | RESPIRATION RATE: 16 BRPM | SYSTOLIC BLOOD PRESSURE: 157 MMHG | WEIGHT: 140 LBS

## 2019-02-20 DIAGNOSIS — I10 ESSENTIAL HYPERTENSION: Primary | ICD-10-CM

## 2019-02-20 LAB
ALBUMIN SERPL-MCNC: 3.6 G/DL (ref 3.4–5)
ALBUMIN/GLOB SERPL: 1.1 {RATIO} (ref 1–2)
ALP LIVER SERPL-CCNC: 64 U/L (ref 55–142)
ALT SERPL-CCNC: 25 U/L (ref 13–56)
ANION GAP SERPL CALC-SCNC: 8 MMOL/L (ref 0–18)
AST SERPL-CCNC: 19 U/L (ref 15–37)
ATRIAL RATE: 98 BPM
BASOPHILS # BLD AUTO: 0.04 X10(3) UL (ref 0–0.2)
BASOPHILS NFR BLD AUTO: 0.9 %
BILIRUB SERPL-MCNC: 0.4 MG/DL (ref 0.1–2)
BILIRUB UR QL STRIP.AUTO: NEGATIVE
BUN BLD-MCNC: 17 MG/DL (ref 7–18)
BUN/CREAT SERPL: 20.2 (ref 10–20)
CALCIUM BLD-MCNC: 9.3 MG/DL (ref 8.5–10.1)
CHLORIDE SERPL-SCNC: 106 MMOL/L (ref 98–107)
CLARITY UR REFRACT.AUTO: CLEAR
CO2 SERPL-SCNC: 24 MMOL/L (ref 21–32)
CREAT BLD-MCNC: 0.84 MG/DL (ref 0.55–1.02)
DEPRECATED RDW RBC AUTO: 38.1 FL (ref 35.1–46.3)
EOSINOPHIL # BLD AUTO: 0.22 X10(3) UL (ref 0–0.7)
EOSINOPHIL NFR BLD AUTO: 4.7 %
ERYTHROCYTE [DISTWIDTH] IN BLOOD BY AUTOMATED COUNT: 12.6 % (ref 11–15)
GLOBULIN PLAS-MCNC: 3.4 G/DL (ref 2.8–4.4)
GLUCOSE BLD-MCNC: 142 MG/DL (ref 70–99)
GLUCOSE BLD-MCNC: 168 MG/DL (ref 70–99)
GLUCOSE BLD-MCNC: 170 MG/DL (ref 70–99)
GLUCOSE BLD-MCNC: 81 MG/DL (ref 70–99)
GLUCOSE UR STRIP.AUTO-MCNC: NEGATIVE MG/DL
HCT VFR BLD AUTO: 39.7 % (ref 35–48)
HGB BLD-MCNC: 13.6 G/DL (ref 12–16)
IMM GRANULOCYTES # BLD AUTO: 0.04 X10(3) UL (ref 0–1)
IMM GRANULOCYTES NFR BLD: 0.9 %
KETONES UR STRIP.AUTO-MCNC: NEGATIVE MG/DL
LEUKOCYTE ESTERASE UR QL STRIP.AUTO: NEGATIVE
LYMPHOCYTES # BLD AUTO: 1.05 X10(3) UL (ref 1–4)
LYMPHOCYTES NFR BLD AUTO: 22.3 %
M PROTEIN MFR SERPL ELPH: 7 G/DL (ref 6.4–8.2)
MCH RBC QN AUTO: 29.1 PG (ref 26–34)
MCHC RBC AUTO-ENTMCNC: 34.3 G/DL (ref 31–37)
MCV RBC AUTO: 85 FL (ref 80–100)
MONOCYTES # BLD AUTO: 0.25 X10(3) UL (ref 0.1–1)
MONOCYTES NFR BLD AUTO: 5.3 %
NEUTROPHILS # BLD AUTO: 3.1 X10 (3) UL (ref 1.5–7.7)
NEUTROPHILS # BLD AUTO: 3.1 X10(3) UL (ref 1.5–7.7)
NEUTROPHILS NFR BLD AUTO: 65.9 %
NITRITE UR QL STRIP.AUTO: NEGATIVE
OSMOLALITY SERPL CALC.SUM OF ELEC: 292 MOSM/KG (ref 275–295)
P AXIS: 74 DEGREES
P-R INTERVAL: 182 MS
PH UR STRIP.AUTO: 7 [PH] (ref 4.5–8)
PLATELET # BLD AUTO: 145 10(3)UL (ref 150–450)
POTASSIUM SERPL-SCNC: 3.7 MMOL/L (ref 3.5–5.1)
PROT UR STRIP.AUTO-MCNC: NEGATIVE MG/DL
Q-T INTERVAL: 350 MS
QRS DURATION: 84 MS
QTC CALCULATION (BEZET): 446 MS
R AXIS: 8 DEGREES
RBC # BLD AUTO: 4.67 X10(6)UL (ref 3.8–5.3)
RBC UR QL AUTO: NEGATIVE
SODIUM SERPL-SCNC: 138 MMOL/L (ref 136–145)
SP GR UR STRIP.AUTO: <1.005 (ref 1–1.03)
T AXIS: 65 DEGREES
TROPONIN I SERPL-MCNC: <0.045 NG/ML (ref ?–0.04)
UROBILINOGEN UR STRIP.AUTO-MCNC: <2 MG/DL
VENTRICULAR RATE: 98 BPM
WBC # BLD AUTO: 4.7 X10(3) UL (ref 4–11)

## 2019-02-20 PROCEDURE — 80053 COMPREHEN METABOLIC PANEL: CPT | Performed by: EMERGENCY MEDICINE

## 2019-02-20 PROCEDURE — 84484 ASSAY OF TROPONIN QUANT: CPT | Performed by: EMERGENCY MEDICINE

## 2019-02-20 PROCEDURE — 93010 ELECTROCARDIOGRAM REPORT: CPT

## 2019-02-20 PROCEDURE — 81003 URINALYSIS AUTO W/O SCOPE: CPT | Performed by: EMERGENCY MEDICINE

## 2019-02-20 PROCEDURE — 99284 EMERGENCY DEPT VISIT MOD MDM: CPT

## 2019-02-20 PROCEDURE — 93005 ELECTROCARDIOGRAM TRACING: CPT

## 2019-02-20 PROCEDURE — 82962 GLUCOSE BLOOD TEST: CPT

## 2019-02-20 PROCEDURE — 85025 COMPLETE CBC W/AUTO DIFF WBC: CPT | Performed by: EMERGENCY MEDICINE

## 2019-02-20 PROCEDURE — 36415 COLL VENOUS BLD VENIPUNCTURE: CPT

## 2019-02-20 PROCEDURE — 99285 EMERGENCY DEPT VISIT HI MDM: CPT

## 2019-02-20 RX ORDER — LOSARTAN POTASSIUM 50 MG/1
50 TABLET ORAL DAILY
Qty: 30 TABLET | Refills: 0 | Status: SHIPPED | OUTPATIENT
Start: 2019-02-20 | End: 2019-03-20

## 2019-02-20 NOTE — TELEPHONE ENCOUNTER
S-pt calling with condition update    B-pt seen in office for history of TIA    A-pt states that her BP has been very high since last night. Readings in upper 160s/100s at home. Went to pharmacy, readings were still high, 180s then 200s.  Did come back down

## 2019-02-20 NOTE — ED NOTES
Daughter came to nurses station stating pt is having a \"hypoglycemic attack\" pt states she needs protein. accucheck done.  [address]

## 2019-02-20 NOTE — ED INITIAL ASSESSMENT (HPI)
Pt states that at 2100 last night pt checked her blood pressure and it was elevated. Pt to ED today because her blood pressure was 210. Pt had a stroke a few weeks ago.

## 2019-02-20 NOTE — ED PROVIDER NOTES
Patient Seen in: [facility] Emergency Department    History   Patient presents with:  Hypertension (cardiovascular)  Anxiety/Panic attack (neurologic)    Stated Complaint: hypertension    HPI    80-year-old female presents for evaluation of elevated bilaterally. Heart: Regular rate and rhythm. Abdomen: Soft, nontender. Skin: No rash. No edema. Neurologic: No focal neurologic deficits. Normal speech pattern  Musculoskeletal: No tenderness or deformity noted. Full range of motion throughout. kept between 015 and 833 systolic, would request that her PCP adjust medication    Discussed with Dr. Nan Campuzano. Okay for the patient to increase losartan to 50 mg daily.   Follow blood pressure and call for follow-up within the next week    Evaluation and t

## 2019-02-21 PROBLEM — F41.0 PANIC ATTACK: Status: ACTIVE | Noted: 2018-04-17

## 2019-02-21 NOTE — PROGRESS NOTES
Several days ago developed anxiety/panic disorder ending in her visit to the emergency room. Reassurance and medication were given.   She was given a appointment with Dr. Cali Henriquez at [facility].  Last year was a very anxiety producing here for her

## 2019-02-26 ENCOUNTER — OFFICE VISIT (OUTPATIENT)
Dept: PHYSICAL THERAPY | Age: 84
End: 2019-02-26
Attending: Other
Payer: MEDICARE

## 2019-02-26 DIAGNOSIS — R26.9 GAIT DISORDER: ICD-10-CM

## 2019-02-26 PROCEDURE — 97162 PT EVAL MOD COMPLEX 30 MIN: CPT

## 2019-02-26 PROCEDURE — 97110 THERAPEUTIC EXERCISES: CPT

## 2019-02-26 NOTE — PROGRESS NOTES
FALL SCREEN EVALUATION   Referring Physician: Dr. Carmen Peters  Diagnosis: Gait disorder (R26.9)     Date of Service: 2/26/2019     PATIENT SUMMARY   Oskar Watson is a 80year old y/o female who presents to therapy today with complaints of gait and balance issu Test:   - Feet together: EO: >30 sec, sway: 1; EC: >30 Sway: 2; FOAM: EO: >30 sec, Sway: 2, EC: 28 sec Sway: 4   - Tandem Stance: R foot back: EO: 28 sec, EC: 6 sec; L foot back: EO: >30 sec, EC: 2 sec Fall Risk: no   - SLS: R 4 sec, L 4 sec Fall Risk: Yes speed between normal, fast and slow speeds. (2)  Mild Impairment: Is able to change speed but demonstrates mild gait deviations, or no gait deviations but unable to achieve a significant change in velocity, or uses an assistive device.    (1)  Moderate Im Treatment Time: 45 min     PLAN OF CARE:    Goals: (to be met in 10 visits)  · Pt will demonstrate improved SLS to >10 seconds ROYA to promote safety and decrease risk of falls on uneven surfaces such as grass  · Pt will perform 4-Item DGI with score of 10/

## 2019-02-28 ENCOUNTER — OFFICE VISIT (OUTPATIENT)
Dept: PHYSICAL THERAPY | Age: 84
End: 2019-02-28
Attending: Other
Payer: MEDICARE

## 2019-02-28 PROCEDURE — 97110 THERAPEUTIC EXERCISES: CPT

## 2019-02-28 PROCEDURE — 97112 NEUROMUSCULAR REEDUCATION: CPT

## 2019-02-28 NOTE — PROGRESS NOTES
Dx: Gait disorder (R26.9)             Authorized # of Visits:  medicare         Next MD visit: none scheduled  Fall Risk: standard         Precautions: n/a             Subjective: No new problems.  Reports that she is working on [employer] and findings exercises a

## 2019-03-05 ENCOUNTER — OFFICE VISIT (OUTPATIENT)
Dept: PHYSICAL THERAPY | Age: 84
End: 2019-03-05
Attending: Other
Payer: MEDICARE

## 2019-03-05 PROCEDURE — 97110 THERAPEUTIC EXERCISES: CPT

## 2019-03-05 PROCEDURE — 97140 MANUAL THERAPY 1/> REGIONS: CPT

## 2019-03-05 NOTE — PROGRESS NOTES
Dx: Gait disorder (R26.9)             Authorized # of Visits:  medicare         Next MD visit: none scheduled  Fall Risk: standard         Precautions: n/a             Subjective: No new problems.  Reports having some pain in both knees with stairs in her c Supine: L/R SQC x10  L/R SLR x10 ea         L/R s/l: STM with FR to ITB x5 min  Clamshells x10 ea         Supine: lateral PF mobilizations         Supine: Peripatellar STM R/L x 5 min        Weight shifting fwd/bwd, lat x10 ea --        airex beam walking

## 2019-03-07 ENCOUNTER — OFFICE VISIT (OUTPATIENT)
Dept: PHYSICAL THERAPY | Age: 84
End: 2019-03-07
Attending: Other
Payer: MEDICARE

## 2019-03-07 PROCEDURE — 97110 THERAPEUTIC EXERCISES: CPT

## 2019-03-07 PROCEDURE — 97140 MANUAL THERAPY 1/> REGIONS: CPT

## 2019-03-07 NOTE — PROGRESS NOTES
Dx: Gait disorder (R26.9)             Authorized # of Visits:  medicare         Next MD visit: none scheduled  Fall Risk: standard         Precautions: n/a             Subjective: No new problems.  Reports that she had to do stairs to get to a computer at h x10 ea light UEs  4\" step L LE lead x10, light UE support  ASU, R LE lead 6\" step x10 ea light UEs  4\" step L LE ASU, LSU and R LSU lead x10, light UE support ASU, LSU 4\" step x10 ea light UE support       minisquats x10 R/L s/l: STM with FR to ITB x5

## 2019-03-11 ENCOUNTER — OFFICE VISIT (OUTPATIENT)
Dept: PHYSICAL THERAPY | Age: 84
End: 2019-03-11
Attending: Other
Payer: MEDICARE

## 2019-03-11 PROCEDURE — 97140 MANUAL THERAPY 1/> REGIONS: CPT

## 2019-03-11 PROCEDURE — 97110 THERAPEUTIC EXERCISES: CPT

## 2019-03-11 NOTE — PROGRESS NOTES
Dx: Gait disorder (R26.9)             Authorized # of Visits:  medicare         Next MD visit: none scheduled  Fall Risk: standard         Precautions: n/a             Subjective: No new problems.  Reports that she tried doing stairs in her condo building a x10 ea light UEs  4\" step L LE ASU, LSU and R LSU lead x10, light UE support ASU, LSU 4\" step x10 ea light UE support  ASU, LSU 4\" step x10 ea No UE support      minisquats x10 R/L s/l: STM with FR to ITB x5 min  R/L s/l clamshells  R/L s/l: STM with FR

## 2019-03-14 ENCOUNTER — OFFICE VISIT (OUTPATIENT)
Dept: PHYSICAL THERAPY | Age: 84
End: 2019-03-14
Attending: Other
Payer: MEDICARE

## 2019-03-14 PROCEDURE — 97112 NEUROMUSCULAR REEDUCATION: CPT

## 2019-03-14 PROCEDURE — 97140 MANUAL THERAPY 1/> REGIONS: CPT

## 2019-03-14 PROCEDURE — 97110 THERAPEUTIC EXERCISES: CPT

## 2019-03-14 NOTE — PROGRESS NOTES
Dx: Gait disorder (R26.9)             Authorized # of Visits:  medicare         Next MD visit: none scheduled  Fall Risk: standard         Precautions: n/a             Subjective: No new problems. Reports no falls or near falls since last visit.   Feels robert light UEs  4\" step L LE lead x10, light UE support  ASU, R LE lead 6\" step x10 ea light UEs  4\" step L LE ASU, LSU and R LSU lead x10, light UE support ASU, LSU 4\" step x10 ea light UE support  ASU, LSU 4\" step x10 ea No UE support  ASU, LSU 4\" step

## 2019-03-19 ENCOUNTER — OFFICE VISIT (OUTPATIENT)
Dept: PHYSICAL THERAPY | Age: 84
End: 2019-03-19
Attending: Other
Payer: MEDICARE

## 2019-03-19 PROCEDURE — 97110 THERAPEUTIC EXERCISES: CPT

## 2019-03-19 PROCEDURE — 97116 GAIT TRAINING THERAPY: CPT

## 2019-03-19 PROCEDURE — 97112 NEUROMUSCULAR REEDUCATION: CPT

## 2019-03-19 NOTE — PROGRESS NOTES
Dx: Gait disorder (R26.9)             Authorized # of Visits:  medicare         Next MD visit: none scheduled  Fall Risk: standard         Precautions: n/a             Subjective: No new problems.  Reports she tripped in her house over a mat in her kitchen ea  Standing SLR R/L RTB flex, abd ext x10 ea  Standing SLR R/L RTB flex, abd ext x10 ea  Standing SLR R/L RTB flex, abd ext x12 ea    Shuttle leg press: bilat knee ext, 4 bands 3x10  R/L SL  3 bands 2x10 ea  Shuttle leg press: bilat knee ext, 4 bands 3x10 ea    airex beam walking fwd, lateral, intermittent UE support x6 lengths -- --  airex beam walking fwd, lateral, intermittent UE support x6 lengths  airex beam walking fwd, lateral, intermittent UE support x6 lengths  airex beam walking fwd, lateral, inte

## 2019-03-20 RX ORDER — LOSARTAN POTASSIUM 50 MG/1
50 TABLET ORAL DAILY
Qty: 90 TABLET | Refills: 1 | Status: SHIPPED | OUTPATIENT
Start: 2019-03-20 | End: 2019-09-08

## 2019-03-21 ENCOUNTER — OFFICE VISIT (OUTPATIENT)
Dept: PHYSICAL THERAPY | Age: 84
End: 2019-03-21
Attending: Other
Payer: MEDICARE

## 2019-03-21 PROCEDURE — 97112 NEUROMUSCULAR REEDUCATION: CPT

## 2019-03-21 PROCEDURE — 97110 THERAPEUTIC EXERCISES: CPT

## 2019-03-21 NOTE — PROGRESS NOTES
Discharge Summary    Pt has attended 8, cancelled 0, and no shown 0 visits in Physical Therapy. Greg Zhao reports feeling 80% improvement with gait and balance since starting therapy.  She has made excellent progress with therapy and is now a low risk for fal (10.0-12.7s)]     4 Item Dynamic Gait Index Score: 11/12   Fall Risk: no  [Scores of 10 or less indicate increased risk for falls]  1. Gait level surface: 3  Grading: Rasta the lowest category that applies.   (3)       Normal: Walks 20’, no assistive devices severe disruption of gait, i.e., staggers outside 15” path, loses balance, stops, reaches for wall.     4. Gait with vertical head turns: 3  Grading: Rasta the lowest category that applies.   (3)       Normal: Preforms head turns smoothly with no change in g please contact me at Dept: [phone]. Sincerely,  Electronically signed by therapist: Lovett Holstein, PT  [de-identified] certification required: Yes  I certify the need for these services furnished under this plan of treatment and while under my care. 3x10  R/L SL  3 bands 2x10 ea  Shuttle leg press: bilat knee ext, 5 bands 3x10  R/L SL  3 bands 2x10 ea --   ASU, LSU R LE lead 6\" step x10 ea light UEs  4\" step L LE lead x10, light UE support  ASU, R LE lead 6\" step x10 ea light UEs  4\" step L LE ASU fwd, lateral, intermittent UE support x6 lengths ea   Supine: left hip long axis distraction mobilization --  bilat HR, TR x20 ea no UE support  bilat HR, TR x20 ea no UE support, EO/EC  bilat HR, TR x20 ea no UE support, EO/EC --   Hkly: bridges 15 x 5 se

## 2019-03-23 ENCOUNTER — TELEPHONE (OUTPATIENT)
Dept: FAMILY MEDICINE CLINIC | Facility: CLINIC | Age: 84
End: 2019-03-23

## 2019-03-24 PROBLEM — D69.6 THROMBOCYTOPENIA: Chronic | Status: ACTIVE | Noted: 2019-03-24

## 2019-03-24 PROBLEM — D69.6 THROMBOCYTOPENIA (HCC): Chronic | Status: ACTIVE | Noted: 2019-03-24

## 2019-03-25 ENCOUNTER — OFFICE VISIT (OUTPATIENT)
Dept: FAMILY MEDICINE CLINIC | Facility: CLINIC | Age: 84
End: 2019-03-25
Payer: MEDICARE

## 2019-03-25 VITALS
SYSTOLIC BLOOD PRESSURE: 124 MMHG | TEMPERATURE: 98 F | HEIGHT: 62 IN | BODY MASS INDEX: 26.31 KG/M2 | OXYGEN SATURATION: 98 % | DIASTOLIC BLOOD PRESSURE: 60 MMHG | WEIGHT: 143 LBS | RESPIRATION RATE: 18 BRPM | HEART RATE: 78 BPM

## 2019-03-25 DIAGNOSIS — F33.41 RECURRENT MAJOR DEPRESSIVE DISORDER, IN PARTIAL REMISSION (HCC): Chronic | ICD-10-CM

## 2019-03-25 DIAGNOSIS — E03.9 ACQUIRED HYPOTHYROIDISM: ICD-10-CM

## 2019-03-25 DIAGNOSIS — F41.0 PANIC ATTACK: ICD-10-CM

## 2019-03-25 DIAGNOSIS — I10 HYPERTENSION, UNSPECIFIED TYPE: ICD-10-CM

## 2019-03-25 DIAGNOSIS — E55.9 VITAMIN D DEFICIENCY: ICD-10-CM

## 2019-03-25 DIAGNOSIS — F41.9 ANXIETY: Chronic | ICD-10-CM

## 2019-03-25 DIAGNOSIS — Z00.00 ENCOUNTER FOR ANNUAL HEALTH EXAMINATION: Primary | ICD-10-CM

## 2019-03-25 DIAGNOSIS — D69.6 THROMBOCYTOPENIA (HCC): Chronic | ICD-10-CM

## 2019-03-25 DIAGNOSIS — Z78.0 ASYMPTOMATIC MENOPAUSE: ICD-10-CM

## 2019-03-25 DIAGNOSIS — I10 ESSENTIAL HYPERTENSION: ICD-10-CM

## 2019-03-25 DIAGNOSIS — Z12.31 ENCOUNTER FOR SCREENING MAMMOGRAM FOR HIGH-RISK PATIENT: ICD-10-CM

## 2019-03-25 DIAGNOSIS — E53.8 VITAMIN B12 DEFICIENCY: ICD-10-CM

## 2019-03-25 DIAGNOSIS — M25.562 LEFT KNEE PAIN, UNSPECIFIED CHRONICITY: ICD-10-CM

## 2019-03-25 DIAGNOSIS — Z13.820 SCREENING FOR OSTEOPOROSIS: ICD-10-CM

## 2019-03-25 DIAGNOSIS — I63.10 CEREBROVASCULAR ACCIDENT (CVA) DUE TO EMBOLISM OF PRECEREBRAL ARTERY (HCC): ICD-10-CM

## 2019-03-25 DIAGNOSIS — G45.9 TIA (TRANSIENT ISCHEMIC ATTACK): ICD-10-CM

## 2019-03-25 PROBLEM — R41.82 ALTERED MENTAL STATUS, UNSPECIFIED ALTERED MENTAL STATUS TYPE: Status: RESOLVED | Noted: 2019-01-20 | Resolved: 2019-03-25

## 2019-03-25 PROBLEM — F32.4 MAJOR DEPRESSION IN PARTIAL REMISSION (HCC): Chronic | Status: ACTIVE | Noted: 2019-03-25

## 2019-03-25 PROBLEM — R26.9 GAIT DISORDER: Status: RESOLVED | Noted: 2019-02-13 | Resolved: 2019-03-25

## 2019-03-25 PROBLEM — L82.0 SEBORRHEIC KERATOSES, INFLAMED: Status: RESOLVED | Noted: 2018-04-17 | Resolved: 2019-03-25

## 2019-03-25 PROBLEM — F32.4 MAJOR DEPRESSION IN PARTIAL REMISSION: Chronic | Status: ACTIVE | Noted: 2019-03-25

## 2019-03-25 PROBLEM — R47.01 APHASIA: Status: RESOLVED | Noted: 2019-01-20 | Resolved: 2019-03-25

## 2019-03-25 PROBLEM — R41.82 ALTERED MENTAL STATUS: Status: RESOLVED | Noted: 2019-01-20 | Resolved: 2019-03-25

## 2019-03-25 PROCEDURE — 99397 PER PM REEVAL EST PAT 65+ YR: CPT | Performed by: FAMILY MEDICINE

## 2019-03-25 PROCEDURE — G0439 PPPS, SUBSEQ VISIT: HCPCS | Performed by: FAMILY MEDICINE

## 2019-03-25 PROCEDURE — 96160 PT-FOCUSED HLTH RISK ASSMT: CPT | Performed by: FAMILY MEDICINE

## 2019-03-25 NOTE — PATIENT INSTRUCTIONS
Anh Kirby's SCREENING SCHEDULE   Tests on this list are recommended by your physician but may not be covered, or covered at this frequency, by your insurer. Please check with your insurance carrier before scheduling to verify coverage.    PREVENTATIVE Covered every 10 years- more often if abnormal There are no preventive care reminders to display for this patient.  Update Health Maintenance if applicable    Flex Sigmoidoscopy Screen  Covered every 5 years No results found for this or any previous visit Please get once after your 65th birthday    Pneumococcal 23 (Pneumovax)  Covered Once after 65 No orders found for this or any previous visit.  Please get once after your 65th birthday    Hepatitis B for Moderate/High Risk       No orders found for this or

## 2019-03-25 NOTE — PROGRESS NOTES
HPI:   Ioana Dwyer is a 80year old female who presents for a MA (Medicare Advantage) [facility] (Once per calendar year).       Annual Physical due on 07/30/2019        Fall/Risk Assessment   She has been screened for Falls and is low risk:      Cognit List:     Panic attack- stable on meds     Acquired hypothyroidism- stable on meds      Anxiety- stable on meds      Hypertension, unspecified type- stable on meds      Cerebrovascular accident (CVA) due to embolism of precerebral artery ([facility] [place] [address]- resolved de [diphenoxylate-atropine]; macrodantin [nitrofurantoin]; metronidazole; mycostatin [nystatin]; zithromax [azithromycin]; and zovirax [acyclovir].     CURRENT MEDICATIONS:     Outpatient Medications Marked as Taking for the 3/25/19 encounter (Office Visit) wi headaches  PSYCHE: denies depression or anxiety  HEMATOLOGIC: denies hx of anemia  ENDOCRINE:  thyroid history  ALL/ASTHMA: denies asthma    EXAM:   /60   Pulse 78   Temp 97.7 °F (36.5 °C) (Oral)   Resp 18   Ht 62\"   Wt 143 lb   SpO2 98%   BMI 26.16 tenderness, Inspection negative, No nipple retraction or dimpling, No nipple discharge or bleeding, No axillary or supraclavicular adenopathy, Normal to palpation without dominant masses    Vaccination History     There is no immunization history on file f months (on 9/25/2019).      Zeenat Rouse DO, 3/25/2019     General Health            This section provided for quick review of chart, separate sheet to patient  [address] Internal Lab or Procedure External Lab or Proce Please get every year    Pneumococcal 13 (Prevnar)  Covered Once after 65 No vaccine history found Please get once after your 65th birthday    Pneumococcal 23 (Pneumovax)  Covered Once after 65 No vaccine history found Please get once after your 65th birth

## 2019-03-29 ENCOUNTER — TELEPHONE (OUTPATIENT)
Dept: FAMILY MEDICINE CLINIC | Facility: CLINIC | Age: 84
End: 2019-03-29

## 2019-03-29 NOTE — TELEPHONE ENCOUNTER
Review previous message spoke to pt she is unsure of type of pneumonia vaccines she received or exact dates.

## 2019-03-29 NOTE — TELEPHONE ENCOUNTER
Pt called to advise Dr. Velma Regalado that she had her 1st pneumonia shot in 2011, second one in 2014 and not due again until 2022.

## 2019-04-02 ENCOUNTER — LAB ENCOUNTER (OUTPATIENT)
Dept: LAB | Age: 84
End: 2019-04-02
Attending: FAMILY MEDICINE
Payer: MEDICARE

## 2019-04-02 DIAGNOSIS — G45.9 TIA (TRANSIENT ISCHEMIC ATTACK): ICD-10-CM

## 2019-04-02 DIAGNOSIS — D69.6 THROMBOCYTOPENIA (HCC): ICD-10-CM

## 2019-04-02 DIAGNOSIS — E55.9 VITAMIN D DEFICIENCY: ICD-10-CM

## 2019-04-02 DIAGNOSIS — E03.9 ACQUIRED HYPOTHYROIDISM: ICD-10-CM

## 2019-04-02 DIAGNOSIS — R73.09 ELEVATED GLUCOSE: ICD-10-CM

## 2019-04-02 DIAGNOSIS — E53.8 VITAMIN B12 DEFICIENCY: ICD-10-CM

## 2019-04-02 DIAGNOSIS — I63.10 CEREBROVASCULAR ACCIDENT (CVA) DUE TO EMBOLISM OF PRECEREBRAL ARTERY (HCC): ICD-10-CM

## 2019-04-02 DIAGNOSIS — M25.562 LEFT KNEE PAIN, UNSPECIFIED CHRONICITY: ICD-10-CM

## 2019-04-02 DIAGNOSIS — I10 ESSENTIAL HYPERTENSION: ICD-10-CM

## 2019-04-02 PROCEDURE — 36415 COLL VENOUS BLD VENIPUNCTURE: CPT

## 2019-04-02 PROCEDURE — 80053 COMPREHEN METABOLIC PANEL: CPT

## 2019-04-02 PROCEDURE — 85025 COMPLETE CBC W/AUTO DIFF WBC: CPT

## 2019-04-02 PROCEDURE — 84443 ASSAY THYROID STIM HORMONE: CPT

## 2019-04-02 PROCEDURE — 82306 VITAMIN D 25 HYDROXY: CPT

## 2019-04-02 PROCEDURE — 83036 HEMOGLOBIN GLYCOSYLATED A1C: CPT

## 2019-04-02 PROCEDURE — 84439 ASSAY OF FREE THYROXINE: CPT

## 2019-04-02 PROCEDURE — 82607 VITAMIN B-12: CPT

## 2019-04-02 PROCEDURE — 86140 C-REACTIVE PROTEIN: CPT

## 2019-04-02 PROCEDURE — 80061 LIPID PANEL: CPT

## 2019-04-02 PROCEDURE — 85652 RBC SED RATE AUTOMATED: CPT

## 2019-04-04 ENCOUNTER — TELEPHONE (OUTPATIENT)
Dept: FAMILY MEDICINE CLINIC | Facility: CLINIC | Age: 84
End: 2019-04-04

## 2019-04-04 NOTE — TELEPHONE ENCOUNTER
Pt said she's returning nurse call regarding results.   She also sched appt with Dr. Steele Bound to discuss the results

## 2019-04-05 ENCOUNTER — TELEPHONE (OUTPATIENT)
Dept: FAMILY MEDICINE CLINIC | Facility: CLINIC | Age: 84
End: 2019-04-05

## 2019-04-15 ENCOUNTER — OFFICE VISIT (OUTPATIENT)
Dept: FAMILY MEDICINE CLINIC | Facility: CLINIC | Age: 84
End: 2019-04-15
Payer: MEDICARE

## 2019-04-15 VITALS
RESPIRATION RATE: 16 BRPM | HEART RATE: 78 BPM | TEMPERATURE: 98 F | DIASTOLIC BLOOD PRESSURE: 76 MMHG | WEIGHT: 144 LBS | OXYGEN SATURATION: 98 % | SYSTOLIC BLOOD PRESSURE: 136 MMHG | HEIGHT: 62 IN | BODY MASS INDEX: 26.5 KG/M2

## 2019-04-15 DIAGNOSIS — I10 ESSENTIAL HYPERTENSION: Primary | ICD-10-CM

## 2019-04-15 PROCEDURE — 99213 OFFICE O/P EST LOW 20 MIN: CPT | Performed by: FAMILY MEDICINE

## 2019-04-15 NOTE — PROGRESS NOTES
Here for follow-up on her blood pressure and low thyroid. She feels very well. Today's exam blood pressure is normal at 125/75. Skin is normal she has numerous seborrheic keratoses she has clear lungs she has normal heart tones she has a soft abdomen.

## 2019-04-19 RX ORDER — LEVOTHYROXINE SODIUM 0.05 MG/1
50 TABLET ORAL
Qty: 90 TABLET | Refills: 3 | Status: SHIPPED | OUTPATIENT
Start: 2019-04-19 | End: 2020-10-15

## 2019-04-27 ENCOUNTER — HOSPITAL ENCOUNTER (OUTPATIENT)
Dept: BONE DENSITY | Age: 84
Discharge: HOME | End: 2019-04-27
Attending: FAMILY MEDICINE
Payer: MEDICARE

## 2019-04-27 DIAGNOSIS — Z13.820 SCREENING FOR OSTEOPOROSIS: ICD-10-CM

## 2019-04-27 DIAGNOSIS — Z78.0 ASYMPTOMATIC MENOPAUSE: ICD-10-CM

## 2019-04-27 PROCEDURE — 77080 DXA BONE DENSITY AXIAL: CPT | Performed by: FAMILY MEDICINE

## 2019-05-08 ENCOUNTER — OFFICE VISIT (OUTPATIENT)
Dept: FAMILY MEDICINE CLINIC | Facility: CLINIC | Age: 84
End: 2019-05-08
Payer: MEDICARE

## 2019-05-08 VITALS
TEMPERATURE: 98 F | RESPIRATION RATE: 16 BRPM | HEIGHT: 62 IN | OXYGEN SATURATION: 97 % | DIASTOLIC BLOOD PRESSURE: 66 MMHG | WEIGHT: 144 LBS | HEART RATE: 87 BPM | BODY MASS INDEX: 26.5 KG/M2 | SYSTOLIC BLOOD PRESSURE: 116 MMHG

## 2019-05-08 DIAGNOSIS — M85.80 OSTEOPENIA, UNSPECIFIED LOCATION: Primary | ICD-10-CM

## 2019-05-08 PROCEDURE — 99213 OFFICE O/P EST LOW 20 MIN: CPT | Performed by: FAMILY MEDICINE

## 2019-05-08 NOTE — PROGRESS NOTES
HPI:   Emily Bazan is a 80year old female who presents for follow up on Dexa    Pt is exercising   Pt on vit d and calcium supplementation   Discussed osteopenia on dexa       Current Outpatient Medications:  Levothyroxine Sodium 50 MCG Oral Tab Take 1 shortness of breath with exertion  CARDIOVASCULAR: denies chest pain on exertion  MUSCULOSKELETAL: denies back pain  NEURO: denies headaches  PSYCHE: denies depression or anxiety  HEMATOLOGIC: denies hx of anemia  ENDOCRINE: denies thyroid history  ALL/AST

## 2019-05-15 ENCOUNTER — OFFICE VISIT (OUTPATIENT)
Dept: NEUROLOGY | Facility: CLINIC | Age: 84
End: 2019-05-15
Payer: MEDICARE

## 2019-05-15 VITALS
SYSTOLIC BLOOD PRESSURE: 120 MMHG | BODY MASS INDEX: 26 KG/M2 | HEART RATE: 72 BPM | WEIGHT: 144 LBS | DIASTOLIC BLOOD PRESSURE: 70 MMHG | RESPIRATION RATE: 18 BRPM

## 2019-05-15 DIAGNOSIS — G45.9 TIA (TRANSIENT ISCHEMIC ATTACK): Primary | ICD-10-CM

## 2019-05-15 DIAGNOSIS — F41.9 ANXIETY: ICD-10-CM

## 2019-05-15 DIAGNOSIS — I10 HYPERTENSION, UNSPECIFIED TYPE: ICD-10-CM

## 2019-05-15 PROCEDURE — 99214 OFFICE O/P EST MOD 30 MIN: CPT | Performed by: OTHER

## 2019-05-15 NOTE — PROGRESS NOTES
[facility] Neurology outpatient progress note  Date of service: 5/15/2019    The patient is following up for TIA. No new symptoms. She was in ER in Feb due to elevated HTN at 190s. No evidence of new acute stroke.   She is on asa 325 mg and unable to tolerate sta Lorabid [Loracarbef]      Monistat [Miconazol*      Sudafed [Pseudoephe*      Trazodone                 Ampicillin                Asa [Aspirin]           NAUSEA ONLY    Comment:325 mg  Azopt [Brinzolamide]      Bactrim [Sulfametho*      Cinobac [Cinoxaci education given  Stroke risk factors management   See orders and medications filed with this encounter. The patient indicates understanding of these issues and agrees with the plan.   Discussed with patient in detail regarding the adverse and side effects o

## 2019-05-29 ENCOUNTER — NURSE ONLY (OUTPATIENT)
Dept: FAMILY MEDICINE CLINIC | Facility: CLINIC | Age: 84
End: 2019-05-29
Payer: MEDICARE

## 2019-05-29 VITALS
DIASTOLIC BLOOD PRESSURE: 60 MMHG | OXYGEN SATURATION: 97 % | HEART RATE: 87 BPM | SYSTOLIC BLOOD PRESSURE: 132 MMHG | TEMPERATURE: 98 F

## 2019-05-29 DIAGNOSIS — R55 VASO-VAGAL REACTION: ICD-10-CM

## 2019-05-29 DIAGNOSIS — R11.0 NAUSEA: Primary | ICD-10-CM

## 2019-05-29 DIAGNOSIS — H53.9 VISION CHANGES: ICD-10-CM

## 2019-05-29 PROCEDURE — 90471 IMMUNIZATION ADMIN: CPT | Performed by: FAMILY MEDICINE

## 2019-05-29 PROCEDURE — 90750 HZV VACC RECOMBINANT IM: CPT | Performed by: FAMILY MEDICINE

## 2019-05-29 PROCEDURE — 99213 OFFICE O/P EST LOW 20 MIN: CPT | Performed by: FAMILY MEDICINE

## 2019-05-29 RX ORDER — ONDANSETRON 4 MG/1
4 TABLET, FILM COATED ORAL EVERY 8 HOURS PRN
Qty: 20 TABLET | Refills: 0 | Status: SHIPPED | OUTPATIENT
Start: 2019-05-29 | End: 2019-07-15

## 2019-05-29 NOTE — PROGRESS NOTES
HPI:    Patient ID: Catarina Cade is a 80year old female. Nausea   This is a new problem. The current episode started today (Pt states it started right after she got the Shingrix Vaccine during her nurse visit today). The problem occurs constantly.  The p Mayonaise                 Medrol [Methylpredn*      Olive Oil                 Paxil [Paroxetine]        Pyridin [Phenazopyr*      Sulfamethoxazole            Comment:Throat swelling  Synalgos Dc               Ngozi [Cefaclor]           Comment:Headache Referrals:  OPHTHALMOLOGY - INTERNAL       PD#[id number]

## 2019-06-19 ENCOUNTER — TELEPHONE (OUTPATIENT)
Dept: FAMILY MEDICINE CLINIC | Facility: CLINIC | Age: 84
End: 2019-06-19

## 2019-06-20 ENCOUNTER — TELEPHONE (OUTPATIENT)
Dept: FAMILY MEDICINE CLINIC | Facility: CLINIC | Age: 84
End: 2019-06-20

## 2019-06-20 NOTE — TELEPHONE ENCOUNTER
Spoke to patient about 2nd vaccine. Patient is currently in [place] and will call when she returns. Patient also stated that after the first shot she had arm soreness for 7-8 days after.

## 2019-06-20 NOTE — TELEPHONE ENCOUNTER
Had called patient about 2nd shingrix vaccine - she is currently in [place]. She had mentioned that after the 1st one she had soreness in arm for 7-8 days after - is this normal - should she get the 2nd dose?     Also she wanted to know what her average

## 2019-06-24 ENCOUNTER — TELEPHONE (OUTPATIENT)
Dept: FAMILY MEDICINE CLINIC | Facility: CLINIC | Age: 84
End: 2019-06-24

## 2019-06-24 NOTE — TELEPHONE ENCOUNTER
Patient would like to discuss BP. She is out of town. Stated she has not gone to ER and also mentioned a TSA? Current BP is 155/87    Told her I would have a nurse contact her.

## 2019-06-24 NOTE — TELEPHONE ENCOUNTER
Pt states she is in [facility], she states b/p 196/89 yesterday, thinks she had mini stroke, lost her balance and  grabbed onto table when felt dizzy, pt rested all day and b/p did come down, pt did not go to ER  Pt state this morning b/

## 2019-06-27 ENCOUNTER — TELEPHONE (OUTPATIENT)
Dept: FAMILY MEDICINE CLINIC | Facility: CLINIC | Age: 84
End: 2019-06-27

## 2019-06-27 DIAGNOSIS — F32.5 MAJOR DEPRESSIVE DISORDER IN REMISSION, UNSPECIFIED WHETHER RECURRENT (HCC): ICD-10-CM

## 2019-06-27 DIAGNOSIS — F41.9 ANXIETY: ICD-10-CM

## 2019-06-27 RX ORDER — ESCITALOPRAM OXALATE 10 MG/1
10 TABLET ORAL DAILY
Qty: 30 TABLET | Refills: 0 | Status: SHIPPED | OUTPATIENT
Start: 2019-06-27 | End: 2019-07-31

## 2019-06-27 NOTE — TELEPHONE ENCOUNTER
Pt suffered a TIA in [place] - she is currently in the hospital in [place]. She was supposed to be arriving home already and did not bring enough medication with her.   She is requesting some lexapro refill to the [facility] in [place] #[phone]

## 2019-07-03 ENCOUNTER — OFFICE VISIT (OUTPATIENT)
Dept: FAMILY MEDICINE CLINIC | Facility: CLINIC | Age: 84
End: 2019-07-03
Payer: MEDICARE

## 2019-07-03 VITALS
RESPIRATION RATE: 16 BRPM | BODY MASS INDEX: 26.13 KG/M2 | SYSTOLIC BLOOD PRESSURE: 114 MMHG | WEIGHT: 142 LBS | HEIGHT: 62 IN | DIASTOLIC BLOOD PRESSURE: 78 MMHG | OXYGEN SATURATION: 98 % | TEMPERATURE: 98 F | HEART RATE: 79 BPM

## 2019-07-03 DIAGNOSIS — I63.10 CEREBROVASCULAR ACCIDENT (CVA) DUE TO EMBOLISM OF PRECEREBRAL ARTERY (HCC): ICD-10-CM

## 2019-07-03 DIAGNOSIS — G45.9 TIA (TRANSIENT ISCHEMIC ATTACK): Primary | ICD-10-CM

## 2019-07-03 DIAGNOSIS — I10 ESSENTIAL HYPERTENSION: ICD-10-CM

## 2019-07-03 DIAGNOSIS — F41.9 ANXIETY: Chronic | ICD-10-CM

## 2019-07-03 DIAGNOSIS — R26.81 UNSTEADY GAIT: ICD-10-CM

## 2019-07-03 PROCEDURE — 99214 OFFICE O/P EST MOD 30 MIN: CPT | Performed by: FAMILY MEDICINE

## 2019-07-03 RX ORDER — ROSUVASTATIN CALCIUM 10 MG/1
TABLET, COATED ORAL
Refills: 0 | COMMUNITY
Start: 2019-06-26 | End: 2019-07-15

## 2019-07-03 RX ORDER — ALPRAZOLAM 0.25 MG/1
0.25 TABLET ORAL NIGHTLY PRN
Qty: 10 TABLET | Refills: 0 | Status: SHIPPED | OUTPATIENT
Start: 2019-07-03 | End: 2019-10-14 | Stop reason: ALTCHOICE

## 2019-07-03 NOTE — PROGRESS NOTES
HPI:    Alicia Johns is a 80year old female here today for hospital follow up. She was discharged from Inpatient hospital, hospital in [facility]  to Home     During the visit, the following was completed:  ?  Obtained and reviewed discharge informati lomotil [diphenoxylate-atropine]; macrodantin [nitrofurantoin]; metronidazole; mycostatin [nystatin]; zithromax [azithromycin]; and zovirax [acyclovir].     Current Meds:    Current Outpatient Medications on File Prior to Visit:  Rosuvastatin Calcium 10 MG denies heartburn, denies diarrhea  MUSCULOSKELETAL: denies pain, normal range of motion of extremities  NEURO: denies headaches, denies dizziness, denies weakness  PSYCHE: denies depression or anxiety  HEMATOLOGIC: denies hx of anemia, or bruising, denies Refills   • ALPRAZolam (XANAX) 0.25 MG Oral Tab 10 tablet 0     Sig: Take 1 tablet (0.25 mg total) by mouth nightly as needed for Sleep.        Imaging & Consults:  NEURO - INTERNAL  OP REFERRAL TO [facility] [facility] [place]         Transitional Care M

## 2019-07-15 ENCOUNTER — OFFICE VISIT (OUTPATIENT)
Dept: NEUROLOGY | Facility: CLINIC | Age: 84
End: 2019-07-15
Payer: MEDICARE

## 2019-07-15 VITALS — HEART RATE: 74 BPM | SYSTOLIC BLOOD PRESSURE: 124 MMHG | RESPIRATION RATE: 14 BRPM | DIASTOLIC BLOOD PRESSURE: 62 MMHG

## 2019-07-15 DIAGNOSIS — G45.9 TIA (TRANSIENT ISCHEMIC ATTACK): Primary | ICD-10-CM

## 2019-07-15 PROCEDURE — 99215 OFFICE O/P EST HI 40 MIN: CPT | Performed by: OTHER

## 2019-07-15 RX ORDER — ASPIRIN 81 MG/1
81 TABLET ORAL DAILY
Qty: 90 TABLET | Refills: 3 | Status: SHIPPED | OUTPATIENT
Start: 2019-07-15 | End: 2021-05-20

## 2019-07-15 RX ORDER — CLOPIDOGREL BISULFATE 75 MG/1
75 TABLET ORAL DAILY
Qty: 90 TABLET | Refills: 3 | Status: SHIPPED | OUTPATIENT
Start: 2019-07-15 | End: 2019-09-17

## 2019-07-15 NOTE — PROGRESS NOTES
Patient here to follow up regarding a TIA. Had another TIA while in [place], felt dizzy, had elevated BP. Here to discuss the next steps.

## 2019-07-15 NOTE — PROGRESS NOTES
[facility] Neurology outpatient progress note  Date of service: 7/15/2019    The patient is following up for  Recurrent TIA like symptoms. She was recently in [facility] for another episode of TIA. She was in ER in Feb due to elevated HTN at 190s.  No evidence of Disp: , Rfl:   Allergies:    Amoxicillin               Biaxin [Clarithromy*      Darvon [Propoxyphen*      Flexeril [Cyclobenz*      Keflex [Cephalexin]       Latex                     Mayonaise                 Medrol [Methylpredn*      Olive Oil 3  Language: Fluency with normal naming and repetition, comprehension normal  Speech: no dysarthria  CN: II-XII    pupils 2-3 mm equal and reactive to light  III, IV, VI extraocular movements intact, no nystagmus, no ptosis  V face sensation normal  VII fa

## 2019-07-23 ENCOUNTER — OFFICE VISIT (OUTPATIENT)
Dept: PHYSICAL THERAPY | Age: 84
End: 2019-07-23
Attending: FAMILY MEDICINE
Payer: MEDICARE

## 2019-07-23 DIAGNOSIS — R26.81 UNSTEADY GAIT: ICD-10-CM

## 2019-07-23 PROCEDURE — 97162 PT EVAL MOD COMPLEX 30 MIN: CPT

## 2019-07-23 PROCEDURE — 97110 THERAPEUTIC EXERCISES: CPT

## 2019-07-23 NOTE — PROGRESS NOTES
FALL SCREEN EVALUATION   Referring Physician: Dr. Elva Jj  Diagnosis: Unsteady gait (R26.81     Date of Service: 7/23/2019     PATIENT SUMMARY   Sherice Coyle is a 80year old female who presents to therapy today with complaints of gait imbalance followin Exam:  Cervical spine AROM: WNL    LE Strength: hip: flex: R/L: 5-/5; abd: R/L; 4/5; ER: R: 4/5, L: 4+/5; IR: R: 4/5, L: 4+/5  LE Flexibility: hamstrings: R/L: 55 deg;                Postural Control:  4-Stage Balance Test:   - Feet together:firm ground: lowest category that applies. (3)  Normal: Able to smoothly change walking speed without loss of balance or gait deviation. Shows a significant difference in walking speed between normal, fast and slow speeds.    (2)  Mild Impairment: Is able to change spe Response:   Pt education was provided on exam findings, treatment diagnosis, treatment plan, expectations, and prognosis. Pt was also provided recommendations for importance of remaining active, strategies to reduce fall risk at home and shoe wear.    Georgette [phone]    Sincerely,  Electronically signed by therapist: Roxi Trivedi, PT  [de-identified] certification required: Yes  I certify the need for these services furnished under this plan of treatment and while under my care.     X__________________________

## 2019-07-25 ENCOUNTER — OFFICE VISIT (OUTPATIENT)
Dept: PHYSICAL THERAPY | Age: 84
End: 2019-07-25
Attending: FAMILY MEDICINE
Payer: MEDICARE

## 2019-07-25 PROCEDURE — 97112 NEUROMUSCULAR REEDUCATION: CPT

## 2019-07-25 PROCEDURE — 97110 THERAPEUTIC EXERCISES: CPT

## 2019-07-25 NOTE — PROGRESS NOTES
Dx: Unsteady gait (R26.81             Insurance (Authorized # of Visits):  6          Authorizing Physician: Dr. Ugarte Sites  Next MD visit: none scheduled  Fall Risk: standard         Precautions: n/a             Subjective: no new problems.  Reports that she

## 2019-07-30 ENCOUNTER — OFFICE VISIT (OUTPATIENT)
Dept: PHYSICAL THERAPY | Age: 84
End: 2019-07-30
Attending: FAMILY MEDICINE
Payer: MEDICARE

## 2019-07-30 PROCEDURE — 97110 THERAPEUTIC EXERCISES: CPT

## 2019-07-30 PROCEDURE — 97112 NEUROMUSCULAR REEDUCATION: CPT

## 2019-07-30 NOTE — PROGRESS NOTES
Dx: Unsteady gait (R26.81             Insurance (Authorized # of Visits):  6          Authorizing Physician: Dr. Shadi Samson  Next MD visit: none scheduled  Fall Risk: standard         Precautions: n/a             Subjective: no new problems.  Reports yellow ba Tandem walking ll bars 4 x10 ft      Lateral walking airex x 6 lengths  Tandem walking airex x 6 lengths  Lateral walking airex x 8 lengths  Tandem walking airex x 6 lengths      Hkly: R heel closer to buttock brigdes 2x10  Supine: R SLR x10  L s/l: R hip

## 2019-07-31 PROBLEM — Z78.9 STATIN INTOLERANCE: Status: ACTIVE | Noted: 2019-07-31

## 2019-07-31 NOTE — PROGRESS NOTES
HPI:   Loman Rinne is a 80year old female who presents for follow up on HTN/ TIA/ anxiety     Pt in PT for right sided weakness   Working on balance  bp variable at home but normal   Some sob/ per pt a pattern for years  Patient denies chest pain,  dizz Surgical History:   Procedure Laterality Date   • HYSTERECTOMY      partial      Family History   Problem Relation Age of Onset   • Heart Disorder Father    • Stroke Mother    • Other (Down's Syndrome) Sister       Social History:   Social History    Tobac mg total) by mouth daily. Dispense: 90 tablet; Refill: 0        Questions answered and patient indicates understanding of these issues and agrees to the plan. Follow up in 3 mo or sooner if needed .

## 2019-08-02 ENCOUNTER — OFFICE VISIT (OUTPATIENT)
Dept: PHYSICAL THERAPY | Age: 84
End: 2019-08-02
Attending: FAMILY MEDICINE
Payer: MEDICARE

## 2019-08-02 PROCEDURE — 97110 THERAPEUTIC EXERCISES: CPT

## 2019-08-02 PROCEDURE — 97112 NEUROMUSCULAR REEDUCATION: CPT

## 2019-08-02 NOTE — PROGRESS NOTES
Dx: Unsteady gait (R26.81             Insurance (Authorized # of Visits):  6          Authorizing Physician: Dr. Shadi Samson  Next MD visit: none scheduled  Fall Risk: standard         Precautions: n/a             Subjective: no new problems.  Reports she is fe 13 sec max     Tandem stance R/L foot back : >30 sec  EC: R foot back: 6 sec max  L foot back: 3 sec max  Tandem stance R/L foot back : >30 sec  EC: R foot back: >30 sec  L foot back: >30 sec tandem stance on airex:   L foot back EO: 20 sec;  R foot back E

## 2019-08-09 ENCOUNTER — TELEPHONE (OUTPATIENT)
Dept: FAMILY MEDICINE CLINIC | Facility: CLINIC | Age: 84
End: 2019-08-09

## 2019-08-13 ENCOUNTER — APPOINTMENT (OUTPATIENT)
Dept: PHYSICAL THERAPY | Age: 84
End: 2019-08-13
Attending: FAMILY MEDICINE
Payer: MEDICARE

## 2019-08-15 ENCOUNTER — APPOINTMENT (OUTPATIENT)
Dept: PHYSICAL THERAPY | Age: 84
End: 2019-08-15
Attending: FAMILY MEDICINE
Payer: MEDICARE

## 2019-08-16 ENCOUNTER — OFFICE VISIT (OUTPATIENT)
Dept: PHYSICAL THERAPY | Age: 84
End: 2019-08-16
Attending: FAMILY MEDICINE
Payer: MEDICARE

## 2019-08-16 PROCEDURE — 97110 THERAPEUTIC EXERCISES: CPT

## 2019-08-16 PROCEDURE — 97112 NEUROMUSCULAR REEDUCATION: CPT

## 2019-08-16 NOTE — PROGRESS NOTES
Dx: Unsteady gait (R26.81             Insurance (Authorized # of Visits):  6          Authorizing Physician: Dr. Samuel Murray  Next MD visit: none scheduled  Fall Risk: standard         Precautions: n/a             Subjective: no new problems.  Reports she has b YTB (latex free) flex, abd, ext x10 ea  Standing SLR R/L LE YTB (latex free) flex, abd, ext x10 ea Lateral band walking YTB (latex free) 35ft x 2    R SLB: 10 sec max  L SLB: 11 sec max  R SLB: 15 sec max  L SLB: 13 sec max  R SLB: 24 sec max  L SLB: 13 se

## 2019-08-20 ENCOUNTER — APPOINTMENT (OUTPATIENT)
Dept: PHYSICAL THERAPY | Age: 84
End: 2019-08-20
Attending: FAMILY MEDICINE
Payer: MEDICARE

## 2019-08-30 ENCOUNTER — APPOINTMENT (OUTPATIENT)
Dept: PHYSICAL THERAPY | Age: 84
End: 2019-08-30
Attending: FAMILY MEDICINE
Payer: MEDICARE

## 2019-09-09 RX ORDER — LOSARTAN POTASSIUM 50 MG/1
50 TABLET ORAL DAILY
Qty: 90 TABLET | Refills: 0 | Status: SHIPPED | OUTPATIENT
Start: 2019-09-09 | End: 2019-09-17

## 2019-09-09 RX ORDER — LOSARTAN POTASSIUM 50 MG/1
50 TABLET ORAL DAILY
Qty: 90 TABLET | Refills: 0 | Status: SHIPPED | OUTPATIENT
Start: 2019-09-09 | End: 2019-09-09

## 2019-09-10 ENCOUNTER — OFFICE VISIT (OUTPATIENT)
Dept: PHYSICAL THERAPY | Age: 84
End: 2019-09-10
Attending: FAMILY MEDICINE
Payer: MEDICARE

## 2019-09-10 PROCEDURE — 97112 NEUROMUSCULAR REEDUCATION: CPT

## 2019-09-10 PROCEDURE — 97110 THERAPEUTIC EXERCISES: CPT

## 2019-09-10 NOTE — PROGRESS NOTES
Dx: Unsteady gait (R26.81             Insurance (Authorized # of Visits):  6          Authorizing Physician: Dr. Galdino Luke  Next MD visit: none scheduled  Fall Risk: standard         Precautions: n/a             Subjective: no new problems.  Reports that she ea  R TR x10  bilat TR EO/EC x20 ea  R TR x10    ASU R LE lead round side of BOSU x10 ea I/m UE support  minisquats on round side of BOSU x10 I/m UE support  ASU R LE lead round side of BOSU x12 ea I/m UE support  minisquats on round side of BOSU x10 I/m U lengths -- --   Hkly: R heel closer to buttock brigdes 2x10  Supine: R SLR x10  L s/l: R hip abd 2x10  L s/l: clamshell 2x10  Hkly: R heel closer to buttock brigdes 2x10  Supine: R SLR x10  L s/l: R hip abd 2x10  L s/l: clamshell 2x10  Hkly: R heel closer

## 2019-09-16 PROBLEM — I10 HYPERTENSION, UNSPECIFIED TYPE: Status: RESOLVED | Noted: 2019-01-20 | Resolved: 2019-09-16

## 2019-09-16 PROBLEM — E78.2 MIXED HYPERLIPIDEMIA: Status: ACTIVE | Noted: 2019-09-16

## 2019-09-16 PROBLEM — I48.0 PAF (PAROXYSMAL ATRIAL FIBRILLATION) (HCC): Status: ACTIVE | Noted: 2019-09-16

## 2019-09-24 ENCOUNTER — OFFICE VISIT (OUTPATIENT)
Dept: PHYSICAL THERAPY | Age: 84
End: 2019-09-24
Attending: FAMILY MEDICINE
Payer: MEDICARE

## 2019-09-24 PROCEDURE — 97110 THERAPEUTIC EXERCISES: CPT

## 2019-09-24 PROCEDURE — 97112 NEUROMUSCULAR REEDUCATION: CPT

## 2019-09-24 NOTE — PROGRESS NOTES
Dx: Unsteady gait (R26.81             Insurance (Authorized # of Visits):  6          Authorizing Physician: Dr. Alyssa Garrett  Next MD visit: none scheduled  Fall Risk: standard         Precautions: n/a             Subjective: no new problems.  Reports that she x20  R SL HR 2x10  bilat HR x20  R SL HR 2x10  bilat HR EO/EC  x20 ea  R SL HR 2x10  bilat HR EO/EC  x20 ea  R SL HR 2x10  bilat HR EO/EC  x20 ea  R SL HR 2x10   bilat TR x15  R TR x10  bilat TR x20  R TR x10  bilat TR x20  R TR x10  bilat TR EO/EC x20 ea sec max Tandem stance on airex:   L foot back EO: >30 sec; EC: 5 sec max  R foot back EO: >30 sec; EC: 17 sec max  Tandem stance on airex:   L foot back EO: >30 sec; EC: 8 sec max  R foot back EO: >30 sec; EC: 15 sec max   Tandem walking ll bars 3 x10 ft T

## 2019-10-06 ENCOUNTER — HOSPITAL ENCOUNTER (EMERGENCY)
Facility: HOSPITAL | Age: 84
Discharge: HOME | End: 2019-10-06
Attending: EMERGENCY MEDICINE
Payer: MEDICARE

## 2019-10-06 VITALS
OXYGEN SATURATION: 95 % | DIASTOLIC BLOOD PRESSURE: 86 MMHG | HEART RATE: 70 BPM | HEIGHT: 62.01 IN | BODY MASS INDEX: 25.8 KG/M2 | WEIGHT: 142 LBS | TEMPERATURE: 97 F | SYSTOLIC BLOOD PRESSURE: 169 MMHG | RESPIRATION RATE: 19 BRPM

## 2019-10-06 DIAGNOSIS — R09.89 LABILE HYPERTENSION: Primary | ICD-10-CM

## 2019-10-06 DIAGNOSIS — K04.7 DENTAL INFECTION: ICD-10-CM

## 2019-10-06 PROCEDURE — 80053 COMPREHEN METABOLIC PANEL: CPT | Performed by: EMERGENCY MEDICINE

## 2019-10-06 PROCEDURE — 87086 URINE CULTURE/COLONY COUNT: CPT | Performed by: EMERGENCY MEDICINE

## 2019-10-06 PROCEDURE — 93010 ELECTROCARDIOGRAM REPORT: CPT

## 2019-10-06 PROCEDURE — 99284 EMERGENCY DEPT VISIT MOD MDM: CPT

## 2019-10-06 PROCEDURE — 85025 COMPLETE CBC W/AUTO DIFF WBC: CPT | Performed by: EMERGENCY MEDICINE

## 2019-10-06 PROCEDURE — 81001 URINALYSIS AUTO W/SCOPE: CPT | Performed by: EMERGENCY MEDICINE

## 2019-10-06 PROCEDURE — 93005 ELECTROCARDIOGRAM TRACING: CPT

## 2019-10-06 PROCEDURE — 84484 ASSAY OF TROPONIN QUANT: CPT | Performed by: EMERGENCY MEDICINE

## 2019-10-06 PROCEDURE — 36415 COLL VENOUS BLD VENIPUNCTURE: CPT

## 2019-10-06 RX ORDER — AMPICILLIN 500 MG/1
500 CAPSULE ORAL 4 TIMES DAILY
Qty: 56 CAPSULE | Refills: 0 | Status: SHIPPED | OUTPATIENT
Start: 2019-10-06 | End: 2019-10-20

## 2019-10-06 NOTE — ED PROVIDER NOTES
Patient Seen in: [facility] Emergency Department      History   Patient presents with:  Hypertension (cardiovascular)    Stated Complaint: HTN    HPI  High blood pressure this morning  Nausea last night all through night after eating lean cuisine  D extremities are benign  ED Course     Labs Reviewed   URINALYSIS WITH CULTURE REFLEX - Abnormal; Notable for the following components:       Result Value    Leukocyte Esterase Urine Small (*)     WBC Urine 5-10 (*)     Bacteria Urine Rare (*)     Mucous Ur list with her. Patient has written down over the years and entire piece of paper of how every medication she has ever taken has affected her. None of these are true allergies.   Some may be reactions but the inability to sleep overnight while on a medicat

## 2019-10-15 ENCOUNTER — OFFICE VISIT (OUTPATIENT)
Dept: PHYSICAL THERAPY | Age: 84
End: 2019-10-15
Attending: FAMILY MEDICINE
Payer: MEDICARE

## 2019-10-15 PROCEDURE — 97110 THERAPEUTIC EXERCISES: CPT

## 2019-10-15 PROCEDURE — 97112 NEUROMUSCULAR REEDUCATION: CPT

## 2019-10-15 NOTE — PROGRESS NOTES
Dx: Unsteady gait (R26.81             Insurance (Authorized # of Visits):  6          Authorizing Physician: Dr. Velma Regalado  Next MD visit: none scheduled  Fall Risk: standard         Precautions: n/a            Discharge Summary  Pt has attended 8 visits in considered increased risk for falls; Vaughn, 2006. ..               61-75 y/o mean 8.1s (7.1-9.0s)               66-76 y/o mean 9.2s (8.2-10.2s)               80-98 y/o mean 11.3s (10.0-12.7s)]     4 Item Dynamic Gait Index Score: 12/12 Fall Risk: no  [Sc Moderate Impairment: Preforms head turns with moderate change in gait velocity, slows down, staggers but recovers, can continue to walk.   (0)       Severe Impairment: Preform task with severe disruption of gait, i.e., staggers outside 15” path, loses josh certification required:  No     Date: 9/10/2019  Tx#: 6/8 Date: 9/24/2019  Tx#: 7/8 Date: 10/15/2019  Tx# 8/8   scifit bike x 5 min  scifit bike x 5 min scifit bike x 5 min    bilat gastroc stretches 3x30 sec  bilat gastroc stretches 3x30 sec  bilat gastro

## 2019-10-15 NOTE — PROGRESS NOTES
[facility] Neurology outpatient progress note  Date of service: 10/14/2019    The patient is following up for recurrent TIA like symptoms although some of these might be anxiety attack related functional symptoms.  She was in ER multiple times due to elevated HTN Take by mouth daily. , Disp: , Rfl:   •  Coenzyme Q10 (COQ10 OR), Take by mouth daily. , Disp: , Rfl:   •  Ascorbic Acid (SUPER C COMPLEX OR), Take by mouth daily. , Disp: , Rfl:   •  Chromium Picolinate (CHROMIUM PICOLATE OR), Take by mouth daily.   , D • Other (Down's Syndrome) Sister       Neurological Examination:  /76   Pulse 72   Resp 16   Wt 145 lb (65.8 kg)   BMI 26.51 kg/m²   Mental status: A & O X 3  Language: no aphasia  Speech: no dysarthria  CN II-XII: intact   Motor strength: 5/5 all

## 2019-10-23 NOTE — PROGRESS NOTES
HPI:   Kacy Beltran is a 80year old female who presents for follow up on HTN/ TIA/ anxiety     Pt just finished PT for right sided weakness   Working on balance  bp varies - sees cards   Stable on current regimen   Patient denies chest pain,  dizziness, Take by mouth daily.   , Disp: , Rfl:        Past Medical History:   Diagnosis Date   • Anxiety    • Back injury 1980   • Infection of tooth    • Thyroid disease    • TIA (transient ischemic attack)     6/23/19      Past Surgical History:   Procedure Misael Rowley escitalopram 10 MG Oral Tab; Take 1 tablet (10 mg total) by mouth daily. Dispense: 90 tablet; Refill: 1    3. Essential hypertension  cpm     4.  TIA (transient ischemic attack)  cpm           Questions answered and patient indicates understanding of these

## 2019-10-28 ENCOUNTER — TELEPHONE (OUTPATIENT)
Dept: FAMILY MEDICINE CLINIC | Facility: CLINIC | Age: 84
End: 2019-10-28

## 2019-10-30 ENCOUNTER — TELEPHONE (OUTPATIENT)
Dept: FAMILY MEDICINE CLINIC | Facility: CLINIC | Age: 84
End: 2019-10-30

## 2019-11-04 ENCOUNTER — NURSE ONLY (OUTPATIENT)
Dept: FAMILY MEDICINE CLINIC | Facility: CLINIC | Age: 84
End: 2019-11-04
Payer: MEDICARE

## 2019-11-04 PROCEDURE — 90750 HZV VACC RECOMBINANT IM: CPT | Performed by: FAMILY MEDICINE

## 2019-11-04 PROCEDURE — 90471 IMMUNIZATION ADMIN: CPT | Performed by: FAMILY MEDICINE

## 2019-11-05 ENCOUNTER — TELEPHONE (OUTPATIENT)
Dept: FAMILY MEDICINE CLINIC | Facility: CLINIC | Age: 84
End: 2019-11-05

## 2019-11-05 ENCOUNTER — OFFICE VISIT (OUTPATIENT)
Dept: FAMILY MEDICINE CLINIC | Facility: CLINIC | Age: 84
End: 2019-11-05
Payer: MEDICARE

## 2019-11-05 VITALS
HEART RATE: 80 BPM | SYSTOLIC BLOOD PRESSURE: 112 MMHG | OXYGEN SATURATION: 98 % | HEIGHT: 62 IN | TEMPERATURE: 99 F | DIASTOLIC BLOOD PRESSURE: 68 MMHG | RESPIRATION RATE: 16 BRPM | WEIGHT: 146 LBS | BODY MASS INDEX: 26.87 KG/M2

## 2019-11-05 DIAGNOSIS — T80.90XA INJECTION SITE REACTION, INITIAL ENCOUNTER: Primary | ICD-10-CM

## 2019-11-05 PROCEDURE — 99213 OFFICE O/P EST LOW 20 MIN: CPT | Performed by: NURSE PRACTITIONER

## 2019-11-05 RX ORDER — CLINDAMYCIN HYDROCHLORIDE 300 MG/1
300 CAPSULE ORAL 3 TIMES DAILY
Qty: 21 CAPSULE | Refills: 0 | Status: SHIPPED | OUTPATIENT
Start: 2019-11-05 | End: 2019-11-12

## 2019-11-05 NOTE — TELEPHONE ENCOUNTER
Pt states she has chills, and body aches,  Pt has no thermometer, unsure if has fever,  No ST, no cough, no other symptoms,    Pt states the site where she had the shot is red, swollen. Advised IC for eval. Pt agrees and will go today.

## 2019-11-05 NOTE — TELEPHONE ENCOUNTER
States she is having a reaction to the shingles shot. Has chills and body aches. Pls call to advise.

## 2019-11-05 NOTE — PROGRESS NOTES
Patient presents with: Other: left arm swollen,body aches and chills sx started today.        HPI:    Felix Knapp is a 80year old female presents with erythema, increased warmth, severe tenderness to palpation to LUE after getting shingles vaccine on 11 Infection of tooth    • Thyroid disease    • TIA (transient ischemic attack)     6/23/19      Past Surgical History:   Procedure Laterality Date   • HYSTERECTOMY      partial      Family History   Problem Relation Age of Onset   • Heart Disorder Father Prescriptions Disp Refills   • Clindamycin HCl 300 MG Oral Cap 21 capsule 0     Sig: Take 1 capsule (300 mg total) by mouth 3 (three) times daily for 7 days. Imaging & Consults:  None    Borders are marked with marker.    Will follow up with PCP offic

## 2019-11-06 ENCOUNTER — OFFICE VISIT (OUTPATIENT)
Dept: FAMILY MEDICINE CLINIC | Facility: CLINIC | Age: 84
End: 2019-11-06
Payer: MEDICARE

## 2019-11-06 VITALS
OXYGEN SATURATION: 98 % | TEMPERATURE: 98 F | HEART RATE: 84 BPM | HEIGHT: 62 IN | WEIGHT: 146 LBS | DIASTOLIC BLOOD PRESSURE: 64 MMHG | RESPIRATION RATE: 16 BRPM | SYSTOLIC BLOOD PRESSURE: 108 MMHG | BODY MASS INDEX: 26.87 KG/M2

## 2019-11-06 DIAGNOSIS — L03.114 CELLULITIS OF LEFT UPPER ARM: Primary | ICD-10-CM

## 2019-11-06 PROCEDURE — 99213 OFFICE O/P EST LOW 20 MIN: CPT | Performed by: PHYSICIAN ASSISTANT

## 2019-11-06 NOTE — PROGRESS NOTES
HPI:    Patient ID: Clarence Kapoor is a 80year old female. HPI   Patient presents today for follow-up of cellulitis to the left upper arm. States she received her second shingles vaccine 2 days ago.   The next morning she woke with erythema and tenderne • Levothyroxine Sodium 50 MCG Oral Tab Take 1 tablet (50 mcg total) by mouth before breakfast. 90 tablet 3   • Multiple Vitamin (MULTI-VITAMIN DAILY) Oral Tab Take by mouth daily. • Misc Natural Products (LUTEIN 20 OR) Take by mouth daily.        • Ca Patient here with worsening cellulitis of the left upper arm. Exam notable for extension of erythema behind the initial line drawn and tenderness to palpation.   Patient unfortunately has multiple antibiotic allergies and treatment options are quite limite

## 2019-12-27 ENCOUNTER — TELEPHONE (OUTPATIENT)
Dept: FAMILY MEDICINE CLINIC | Facility: CLINIC | Age: 84
End: 2019-12-27

## 2019-12-27 NOTE — TELEPHONE ENCOUNTER
Spoke w/ pt, states she is hypoglycemic and that is kind of what this episode felt like, but not entirely the same. Has been measuring her HR using the handles on the treadmill. Said she doesn't walk fast and doesn't feel like she's over doing it.   Ate pr

## 2019-12-27 NOTE — TELEPHONE ENCOUNTER
THIS MORNING SHE WAS EXERCISING ON HER TREADMILL AND SHE NOTICED HER HEART RATE WENT UP . SHE WAS PALE AND GOT VERY HUNGRY QUICKLY. HER NEIGHBOR CAME OVER AND SAID SHE LOOKED VERY PALE AND SHE WAS WEAK.     FEELS BETTER NOW, BUT IT TOOK ABOUT AN STU

## 2020-02-12 ENCOUNTER — OFFICE VISIT (OUTPATIENT)
Dept: FAMILY MEDICINE CLINIC | Facility: CLINIC | Age: 85
End: 2020-02-12
Payer: MEDICARE

## 2020-02-12 VITALS
WEIGHT: 141 LBS | DIASTOLIC BLOOD PRESSURE: 60 MMHG | BODY MASS INDEX: 25.95 KG/M2 | RESPIRATION RATE: 16 BRPM | HEART RATE: 78 BPM | TEMPERATURE: 99 F | HEIGHT: 62 IN | SYSTOLIC BLOOD PRESSURE: 122 MMHG

## 2020-02-12 DIAGNOSIS — M70.72 BURSITIS OF LEFT HIP, UNSPECIFIED BURSA: ICD-10-CM

## 2020-02-12 DIAGNOSIS — M16.0 BILATERAL HIP JOINT ARTHRITIS: Primary | ICD-10-CM

## 2020-02-12 PROCEDURE — 99213 OFFICE O/P EST LOW 20 MIN: CPT | Performed by: PHYSICIAN ASSISTANT

## 2020-02-12 RX ORDER — CELECOXIB 100 MG/1
100 CAPSULE ORAL 2 TIMES DAILY
Qty: 30 CAPSULE | Refills: 0 | Status: SHIPPED | OUTPATIENT
Start: 2020-02-12 | End: 2020-02-13

## 2020-02-12 NOTE — PROGRESS NOTES
Aleksey Wright is a 80year old female. Patient presents with:  Hip Pain: rm 5 , left hip pain , started 1 week ago ,. also has tickle in her throat but not feeling ill       HPI:   Patient presents today complaining of left hip pain for the last week.   No OR) Take by mouth daily. • Chromium Picolinate (CHROMIUM PICOLATE OR) Take by mouth daily.           Past Medical History:   Diagnosis Date   • Anxiety    • Back injury 1980   • Infection of tooth    • Thyroid disease    • TIA (transient ischemic demario improve with these measures. - [facility]            The patient indicates understanding of these issues and agrees to the plan. No follow-ups on file.       Patricia Rowell PA-C  2/12/2020  2:44 PM

## 2020-02-13 ENCOUNTER — TELEPHONE (OUTPATIENT)
Dept: FAMILY MEDICINE CLINIC | Facility: CLINIC | Age: 85
End: 2020-02-13

## 2020-02-13 NOTE — TELEPHONE ENCOUNTER
Although the likelihood of cross-reactivity is low, since her reaction to sulfa was throat swelling we should avoid Celebrex.   She has a plethora of drug allergies and given her anticoagulation the safest thing for her to take is extra strength Tylenol 1 t

## 2020-02-24 ENCOUNTER — OFFICE VISIT (OUTPATIENT)
Dept: PHYSICAL THERAPY | Age: 85
End: 2020-02-24
Attending: PHYSICIAN ASSISTANT
Payer: MEDICARE

## 2020-02-24 DIAGNOSIS — M16.0 BILATERAL HIP JOINT ARTHRITIS: ICD-10-CM

## 2020-02-24 PROCEDURE — 97110 THERAPEUTIC EXERCISES: CPT

## 2020-02-24 PROCEDURE — 97161 PT EVAL LOW COMPLEX 20 MIN: CPT

## 2020-02-24 NOTE — PROGRESS NOTES
LOWER EXTREMITY EVALUATION:   Referring Physician: Dr. Aleks Stoll  Diagnosis: Bilateral hip joint arthritis (M16.0)     Date of Service: 2/24/2020     PATIENT SUMMARY   Monty Cobb is a 80year old female who presents to therapy today with complaints of reported pain. Skilled Physical Therapy is medically necessary to address the above impairments and reach functional goals.      Precautions:  None  OBJECTIVE:   Observation: antalglic gait  Palpation: no significant tenderness in left hip  Sensation: Sensa improved hip AROM Flex to 100 deg pain free to be able to don/doff shoes and perform car transfers without difficulty   · Pt will improve hip ABD and ER strength to 4+/5 to increase ease with standing and walking   · Pt to report ability to ambulate commun

## 2020-02-27 ENCOUNTER — OFFICE VISIT (OUTPATIENT)
Dept: PHYSICAL THERAPY | Age: 85
End: 2020-02-27
Attending: PHYSICIAN ASSISTANT
Payer: MEDICARE

## 2020-02-27 PROCEDURE — 97140 MANUAL THERAPY 1/> REGIONS: CPT

## 2020-02-27 PROCEDURE — 97110 THERAPEUTIC EXERCISES: CPT

## 2020-02-27 NOTE — PROGRESS NOTES
Dx: Bilateral hip joint arthritis (M16.0);    Left hip/groin pain      Insurance (Authorized # of Visits):  Medicare- 10 recommended           Authorizing Physician: Dr. Maty Ernandez MD visit: none scheduled  Fall Risk: standard         Precautions: Latex

## 2020-03-02 ENCOUNTER — OFFICE VISIT (OUTPATIENT)
Dept: PHYSICAL THERAPY | Age: 85
End: 2020-03-02
Attending: PHYSICIAN ASSISTANT
Payer: MEDICARE

## 2020-03-02 PROCEDURE — 97140 MANUAL THERAPY 1/> REGIONS: CPT

## 2020-03-02 PROCEDURE — 97110 THERAPEUTIC EXERCISES: CPT

## 2020-03-02 NOTE — PROGRESS NOTES
Dx: Bilateral hip joint arthritis (M16.0);    Left hip/groin pain      Insurance (Authorized # of Visits):  Medicare- 10 recommended           Authorizing Physician: Dr. Lon Najera  Next MD visit: none scheduled  Fall Risk: standard         Precautions: Latex mobilizations   Prone quad stretch 3x 10 sec      Lateral band walking RTB 35ft x 2 S/l: clamshells x10 ea      L/R s/l: STM with FR to lateral hip and ITB x 6 min L/R s/l: STM with FR to lateral hip and ITB x 6 min      CP x10 min L hip  CP x10 min L hip

## 2020-03-05 ENCOUNTER — OFFICE VISIT (OUTPATIENT)
Dept: PHYSICAL THERAPY | Age: 85
End: 2020-03-05
Attending: PHYSICIAN ASSISTANT
Payer: MEDICARE

## 2020-03-05 PROCEDURE — 97110 THERAPEUTIC EXERCISES: CPT

## 2020-03-05 PROCEDURE — 97140 MANUAL THERAPY 1/> REGIONS: CPT

## 2020-03-05 NOTE — PROGRESS NOTES
Dx: Bilateral hip joint arthritis (M16.0);    Left hip/groin pain      Insurance (Authorized # of Visits):  Medicare- 10 recommended           Authorizing Physician: Dr. Lynne Monroy  Next MD visit: none scheduled  Fall Risk: standard         Precautions: Latex 3x 10 sec   L SKTC stretches 5x10 sec  Supine: left hip PROM,   L SKTC 5x10 sec  DKTC 5 x 10 sec     Hkly: TrA 10x 10 sec  Hkly: LTR x10  Hkly: bilat hip abd with RTB 2x10  Hkly: TrA 10x 10 sec  Hkly: LTR x10  Hkly: bilat hip abd with GTB 3x10  Hkly:  TrA 1

## 2020-03-09 ENCOUNTER — APPOINTMENT (OUTPATIENT)
Dept: PHYSICAL THERAPY | Age: 85
End: 2020-03-09
Attending: PHYSICIAN ASSISTANT
Payer: MEDICARE

## 2020-03-12 ENCOUNTER — OFFICE VISIT (OUTPATIENT)
Dept: PHYSICAL THERAPY | Age: 85
End: 2020-03-12
Attending: PHYSICIAN ASSISTANT
Payer: MEDICARE

## 2020-03-12 PROCEDURE — 97110 THERAPEUTIC EXERCISES: CPT

## 2020-03-12 PROCEDURE — 97140 MANUAL THERAPY 1/> REGIONS: CPT

## 2020-03-12 NOTE — PROGRESS NOTES
Dx: Bilateral hip joint arthritis (M16.0);    Left hip/groin pain      Insurance (Authorized # of Visits):  Medicare- 10 recommended           Authorizing Physician: Dr. Lon Najera  Next MD visit: none scheduled  Fall Risk: standard         Precautions: Latex stretches 5x10 sec  Supine: left hip PROM,   L SKTC 5x10 sec  DKTC 5 x 10 sec  Supine: left hip PROM,   L SKTC 5x10 sec  DKTC 5 x 10 sec    Hkly: TrA 10x 10 sec  Hkly: LTR x10  Hkly: bilat hip abd with RTB 2x10  Hkly:  TrA 10x 10 sec  Hkly: LTR x10  Hkly: b

## 2020-03-17 ENCOUNTER — TELEPHONE (OUTPATIENT)
Dept: PHYSICAL THERAPY | Age: 85
End: 2020-03-17

## 2020-03-17 ENCOUNTER — TELEPHONE (OUTPATIENT)
Dept: FAMILY MEDICINE CLINIC | Facility: CLINIC | Age: 85
End: 2020-03-17

## 2020-03-17 DIAGNOSIS — H53.9 VISION DISTURBANCE: Primary | ICD-10-CM

## 2020-03-17 NOTE — TELEPHONE ENCOUNTER
Contacted pt to discuss department's initiative to protect all non-essential and high risk patients from COVID-19 exposure. Discussed recommendations relative to pt's home program and reschedule future appointments.  Explained that the clinic is cancelling

## 2020-03-19 ENCOUNTER — APPOINTMENT (OUTPATIENT)
Dept: PHYSICAL THERAPY | Age: 85
End: 2020-03-19
Attending: PHYSICIAN ASSISTANT
Payer: MEDICARE

## 2020-03-23 ENCOUNTER — APPOINTMENT (OUTPATIENT)
Dept: PHYSICAL THERAPY | Age: 85
End: 2020-03-23
Attending: PHYSICIAN ASSISTANT
Payer: MEDICARE

## 2020-03-26 ENCOUNTER — APPOINTMENT (OUTPATIENT)
Dept: PHYSICAL THERAPY | Age: 85
End: 2020-03-26
Attending: PHYSICIAN ASSISTANT
Payer: MEDICARE

## 2020-04-09 ENCOUNTER — TELEPHONE (OUTPATIENT)
Dept: FAMILY MEDICINE CLINIC | Facility: CLINIC | Age: 85
End: 2020-04-09

## 2020-04-09 NOTE — TELEPHONE ENCOUNTER
Pt has been dealing with a bleeding hemorrhoid for over a month and it's not subsiding.   Pls advise next step

## 2020-04-16 DIAGNOSIS — F32.5 MAJOR DEPRESSIVE DISORDER IN REMISSION, UNSPECIFIED WHETHER RECURRENT (HCC): ICD-10-CM

## 2020-04-16 DIAGNOSIS — F41.9 ANXIETY: ICD-10-CM

## 2020-04-17 RX ORDER — ESCITALOPRAM OXALATE 10 MG/1
TABLET ORAL
Qty: 90 TABLET | Refills: 0 | Status: SHIPPED | OUTPATIENT
Start: 2020-04-17 | End: 2020-08-04

## 2020-04-21 ENCOUNTER — TELEPHONE (OUTPATIENT)
Dept: FAMILY MEDICINE CLINIC | Facility: CLINIC | Age: 85
End: 2020-04-21

## 2020-04-21 ENCOUNTER — VIRTUAL PHONE E/M (OUTPATIENT)
Dept: FAMILY MEDICINE CLINIC | Facility: CLINIC | Age: 85
End: 2020-04-21
Payer: MEDICARE

## 2020-04-21 DIAGNOSIS — K62.89 RECTAL PAIN: Primary | ICD-10-CM

## 2020-04-21 DIAGNOSIS — K62.5 RECTAL BLEEDING: ICD-10-CM

## 2020-04-21 PROCEDURE — 99441 PHONE E/M BY PHYS 5-10 MIN: CPT | Performed by: FAMILY MEDICINE

## 2020-04-21 RX ORDER — LIDOCAINE HCL AND HYDROCORTISONE ACETATE 20; 20 MG/G; MG/G
1 CREAM RECTAL 2 TIMES DAILY
Qty: 1 KIT | Refills: 1 | Status: SHIPPED | OUTPATIENT
Start: 2020-04-21 | End: 2020-11-11 | Stop reason: ALTCHOICE

## 2020-04-21 NOTE — PROGRESS NOTES
Virtual Telephone Check-In    Gato King verbally consents to a Virtual/Telephone Check-In visit on 04/21/20. Patient understands and accepts financial responsibility for any deductible, co-insurance and/or co-pays associated with this service.     Du

## 2020-04-21 NOTE — TELEPHONE ENCOUNTER
Eric Encinas called and was asking about    Alternative to Lidocaine-Hydrocortisone Ace 2-2 % Rectal Kit    High Copay $200.00 andre

## 2020-04-21 NOTE — TELEPHONE ENCOUNTER
Please see message from pt, med copay cost of rhte lidocaine-hydrocortisone is $200. Would you like to rx something else?

## 2020-05-12 ENCOUNTER — TELEPHONE (OUTPATIENT)
Dept: PHYSICAL THERAPY | Age: 85
End: 2020-05-12

## 2020-05-19 ENCOUNTER — TELEPHONE (OUTPATIENT)
Dept: FAMILY MEDICINE CLINIC | Facility: CLINIC | Age: 85
End: 2020-05-19

## 2020-07-07 ENCOUNTER — TELEPHONE (OUTPATIENT)
Dept: FAMILY MEDICINE CLINIC | Facility: CLINIC | Age: 85
End: 2020-07-07

## 2020-07-07 DIAGNOSIS — E55.9 VITAMIN D DEFICIENCY: ICD-10-CM

## 2020-07-07 DIAGNOSIS — Z78.9 STATIN INTOLERANCE: Primary | ICD-10-CM

## 2020-07-07 DIAGNOSIS — Z13.228 SCREENING FOR METABOLIC DISORDER: ICD-10-CM

## 2020-07-07 DIAGNOSIS — Z13.29 SCREENING FOR ENDOCRINE DISORDER: ICD-10-CM

## 2020-07-07 DIAGNOSIS — I10 HYPERTENSION, UNSPECIFIED TYPE: ICD-10-CM

## 2020-07-07 DIAGNOSIS — E53.8 VITAMIN B12 DEFICIENCY: ICD-10-CM

## 2020-07-07 NOTE — TELEPHONE ENCOUNTER
Dr. Fermin Brito, last labs with LE on 4/2/2019. Please review pended labs, advise if appropriate or add ons.

## 2020-07-14 ENCOUNTER — TELEPHONE (OUTPATIENT)
Dept: ADMINISTRATIVE | Age: 85
End: 2020-07-14

## 2020-07-21 ENCOUNTER — OFFICE VISIT (OUTPATIENT)
Dept: FAMILY MEDICINE CLINIC | Facility: CLINIC | Age: 85
End: 2020-07-21
Payer: MEDICARE

## 2020-07-21 VITALS
SYSTOLIC BLOOD PRESSURE: 124 MMHG | WEIGHT: 139 LBS | BODY MASS INDEX: 26.59 KG/M2 | HEIGHT: 60.5 IN | OXYGEN SATURATION: 98 % | HEART RATE: 75 BPM | RESPIRATION RATE: 18 BRPM | DIASTOLIC BLOOD PRESSURE: 70 MMHG

## 2020-07-21 DIAGNOSIS — Z00.00 ENCOUNTER FOR ANNUAL HEALTH EXAMINATION: Primary | ICD-10-CM

## 2020-07-21 DIAGNOSIS — I10 ESSENTIAL HYPERTENSION: ICD-10-CM

## 2020-07-21 DIAGNOSIS — F41.0 PANIC ATTACK: ICD-10-CM

## 2020-07-21 DIAGNOSIS — M85.80 OSTEOPENIA, UNSPECIFIED LOCATION: ICD-10-CM

## 2020-07-21 DIAGNOSIS — I63.10 CEREBROVASCULAR ACCIDENT (CVA) DUE TO EMBOLISM OF PRECEREBRAL ARTERY (HCC): ICD-10-CM

## 2020-07-21 DIAGNOSIS — E78.2 MIXED HYPERLIPIDEMIA: ICD-10-CM

## 2020-07-21 DIAGNOSIS — E03.9 HYPOTHYROIDISM, UNSPECIFIED TYPE: ICD-10-CM

## 2020-07-21 DIAGNOSIS — F41.9 ANXIETY: Chronic | ICD-10-CM

## 2020-07-21 DIAGNOSIS — I48.0 PAF (PAROXYSMAL ATRIAL FIBRILLATION) (HCC): ICD-10-CM

## 2020-07-21 PROBLEM — G45.9 TIA (TRANSIENT ISCHEMIC ATTACK): Status: RESOLVED | Noted: 2019-02-13 | Resolved: 2020-07-21

## 2020-07-21 PROBLEM — L98.499 SKIN ULCER OF HAND (HCC): Status: ACTIVE | Noted: 2020-07-21

## 2020-07-21 PROBLEM — L98.499 SKIN ULCER OF HAND (HCC): Status: RESOLVED | Noted: 2020-07-21 | Resolved: 2020-07-21

## 2020-07-21 PROBLEM — F32.4 MAJOR DEPRESSION IN PARTIAL REMISSION: Chronic | Status: RESOLVED | Noted: 2019-03-25 | Resolved: 2020-07-21

## 2020-07-21 PROBLEM — F32.4 MAJOR DEPRESSION IN PARTIAL REMISSION (HCC): Chronic | Status: RESOLVED | Noted: 2019-03-25 | Resolved: 2020-07-21

## 2020-07-21 PROCEDURE — 3078F DIAST BP <80 MM HG: CPT | Performed by: FAMILY MEDICINE

## 2020-07-21 PROCEDURE — 3074F SYST BP LT 130 MM HG: CPT | Performed by: FAMILY MEDICINE

## 2020-07-21 PROCEDURE — G0439 PPPS, SUBSEQ VISIT: HCPCS | Performed by: FAMILY MEDICINE

## 2020-07-21 PROCEDURE — 96160 PT-FOCUSED HLTH RISK ASSMT: CPT | Performed by: FAMILY MEDICINE

## 2020-07-21 PROCEDURE — 99397 PER PM REEVAL EST PAT 65+ YR: CPT | Performed by: FAMILY MEDICINE

## 2020-07-21 PROCEDURE — 3008F BODY MASS INDEX DOCD: CPT | Performed by: FAMILY MEDICINE

## 2020-07-21 NOTE — PROGRESS NOTES
HPI:   Catarina Cade is a 80year old female who presents for a MA (Medicare Advantage) [address] [facility] (Once per calendar year).     Her last annual assessment has been over 1 year: Annual Physical due on 03/25/2020         Fall/Risk Assessment   She has been PAF (paroxysmal atrial fibrillation) (HCC)     Hypothyroidism    Wt Readings from Last 3 Encounters:  07/21/20 : 139 lb (63 kg)  06/17/20 : 140 lb (63.5 kg)  02/12/20 : 141 lb (64 kg)     Last Cholesterol Labs:   Lab Results   Component Value Date    RITO past surgical history that includes hysterectomy. Her family history includes Down's Syndrome in her sister; Heart Disorder in her father; Stroke in her mother; hypoglycemia in her brother. SOCIAL HISTORY:   She  reports that she has never smoked.  She midline,mucosa normal, no drainage or sinus tenderness   Throat: Lips, mucosa, and tongue normal; teeth and gums normal   Neck: Supple, symmetrical, trachea midline, no adenopathy;  thyroid: not enlarged, symmetric, no tenderness/mass/nodules; no carotid b year.  Should receive a flu vaccine this fall. Diet assessment: good     PLAN:  The patient indicates understanding of these issues and agrees to the plan. Lab work ordered. Return in 6 months (on 1/21/2021).      Galindo Smith MD, 7/21/2020 04/27/2019    No flowsheet data found. Pap and Pelvic      Pap: Every 3 yrs age 21-68 or Pap+HPV every 5 yrs age 33-67, age 72 and older at high risk There are no preventive care reminders to display for this patient.  Update [employer] if applic 0.74    No flowsheet data found. Drug Serum Conc  Annually No results found for: DIGOXIN, DIG, VALP No flowsheet data found.                Template: ALFA [facility]ARRON MEDICARE ANNUAL ASSESSMENT FEMALE [[id number]]

## 2020-07-21 NOTE — PATIENT INSTRUCTIONS
You may use [employer] or call [phone] to schedule your labs. Andrés Kirby's SCREENING SCHEDULE   Tests on this list are recommended by your physician but may not be covered, or covered at this frequency, by your insurer.  Please check with your i Colorectal Cancer Screening  Covered up to Age 76     Colonoscopy Screen   Covered every 10 years- more often if abnormal There are no preventive care reminders to display for this patient.  Update Health Maintenance if applicable    Flex Sigmoidoscopy Sc Pneumococcal 13 (Prevnar)  Covered Once after 65 No orders found for this or any previous visit. Please get once after your 65th birthday    Pneumococcal 23 (Pneumovax)  Covered Once after 65 No orders found for this or any previous visit.  Please get once

## 2020-07-23 ENCOUNTER — LABORATORY ENCOUNTER (OUTPATIENT)
Dept: LAB | Age: 85
End: 2020-07-23
Attending: FAMILY MEDICINE
Payer: MEDICARE

## 2020-07-23 DIAGNOSIS — Z13.228 SCREENING FOR METABOLIC DISORDER: ICD-10-CM

## 2020-07-23 DIAGNOSIS — Z13.29 SCREENING FOR ENDOCRINE DISORDER: ICD-10-CM

## 2020-07-23 DIAGNOSIS — Z78.9 STATIN INTOLERANCE: ICD-10-CM

## 2020-07-23 DIAGNOSIS — I10 HYPERTENSION, UNSPECIFIED TYPE: ICD-10-CM

## 2020-07-23 LAB
ALBUMIN SERPL-MCNC: 3.4 G/DL (ref 3.4–5)
ALBUMIN/GLOB SERPL: 1 {RATIO} (ref 1–2)
ALP LIVER SERPL-CCNC: 48 U/L (ref 55–142)
ALT SERPL-CCNC: 21 U/L (ref 13–56)
ANION GAP SERPL CALC-SCNC: 4 MMOL/L (ref 0–18)
AST SERPL-CCNC: 17 U/L (ref 15–37)
BASOPHILS # BLD AUTO: 0.03 X10(3) UL (ref 0–0.2)
BASOPHILS NFR BLD AUTO: 0.6 %
BILIRUB SERPL-MCNC: 0.4 MG/DL (ref 0.1–2)
BUN BLD-MCNC: 14 MG/DL (ref 7–18)
BUN/CREAT SERPL: 19.2 (ref 10–20)
CALCIUM BLD-MCNC: 9.4 MG/DL (ref 8.5–10.1)
CHLORIDE SERPL-SCNC: 106 MMOL/L (ref 98–112)
CHOLEST SMN-MCNC: 243 MG/DL (ref ?–200)
CO2 SERPL-SCNC: 28 MMOL/L (ref 21–32)
CREAT BLD-MCNC: 0.73 MG/DL (ref 0.55–1.02)
DEPRECATED RDW RBC AUTO: 39.9 FL (ref 35.1–46.3)
EOSINOPHIL # BLD AUTO: 0.24 X10(3) UL (ref 0–0.7)
EOSINOPHIL NFR BLD AUTO: 5 %
ERYTHROCYTE [DISTWIDTH] IN BLOOD BY AUTOMATED COUNT: 12.8 % (ref 11–15)
GLOBULIN PLAS-MCNC: 3.5 G/DL (ref 2.8–4.4)
GLUCOSE BLD-MCNC: 97 MG/DL (ref 70–99)
HCT VFR BLD AUTO: 36.4 % (ref 35–48)
HDLC SERPL-MCNC: 49 MG/DL (ref 40–59)
HGB BLD-MCNC: 11.9 G/DL (ref 12–16)
IMM GRANULOCYTES # BLD AUTO: 0.01 X10(3) UL (ref 0–1)
IMM GRANULOCYTES NFR BLD: 0.2 %
LDLC SERPL CALC-MCNC: 168 MG/DL (ref ?–100)
LYMPHOCYTES # BLD AUTO: 1.36 X10(3) UL (ref 1–4)
LYMPHOCYTES NFR BLD AUTO: 28.2 %
M PROTEIN MFR SERPL ELPH: 6.9 G/DL (ref 6.4–8.2)
MCH RBC QN AUTO: 28.1 PG (ref 26–34)
MCHC RBC AUTO-ENTMCNC: 32.7 G/DL (ref 31–37)
MCV RBC AUTO: 86.1 FL (ref 80–100)
MONOCYTES # BLD AUTO: 0.32 X10(3) UL (ref 0.1–1)
MONOCYTES NFR BLD AUTO: 6.6 %
NEUTROPHILS # BLD AUTO: 2.87 X10 (3) UL (ref 1.5–7.7)
NEUTROPHILS # BLD AUTO: 2.87 X10(3) UL (ref 1.5–7.7)
NEUTROPHILS NFR BLD AUTO: 59.4 %
NONHDLC SERPL-MCNC: 194 MG/DL (ref ?–130)
OSMOLALITY SERPL CALC.SUM OF ELEC: 286 MOSM/KG (ref 275–295)
PATIENT FASTING Y/N/NP: YES
PATIENT FASTING Y/N/NP: YES
PLATELET # BLD AUTO: 190 10(3)UL (ref 150–450)
POTASSIUM SERPL-SCNC: 4.4 MMOL/L (ref 3.5–5.1)
RBC # BLD AUTO: 4.23 X10(6)UL (ref 3.8–5.3)
SODIUM SERPL-SCNC: 138 MMOL/L (ref 136–145)
TRIGL SERPL-MCNC: 132 MG/DL (ref 30–149)
TSI SER-ACNC: 1.94 MIU/ML (ref 0.36–3.74)
VLDLC SERPL CALC-MCNC: 26 MG/DL (ref 0–30)
WBC # BLD AUTO: 4.8 X10(3) UL (ref 4–11)

## 2020-07-23 PROCEDURE — 80061 LIPID PANEL: CPT

## 2020-07-23 PROCEDURE — 84443 ASSAY THYROID STIM HORMONE: CPT

## 2020-07-23 PROCEDURE — 36415 COLL VENOUS BLD VENIPUNCTURE: CPT

## 2020-07-23 PROCEDURE — 80053 COMPREHEN METABOLIC PANEL: CPT

## 2020-07-23 PROCEDURE — 85025 COMPLETE CBC W/AUTO DIFF WBC: CPT

## 2020-07-27 ENCOUNTER — TELEPHONE (OUTPATIENT)
Dept: FAMILY MEDICINE CLINIC | Facility: CLINIC | Age: 85
End: 2020-07-27

## 2020-08-04 DIAGNOSIS — F41.9 ANXIETY: ICD-10-CM

## 2020-08-04 DIAGNOSIS — F32.5 MAJOR DEPRESSIVE DISORDER IN REMISSION, UNSPECIFIED WHETHER RECURRENT (HCC): ICD-10-CM

## 2020-08-04 RX ORDER — ESCITALOPRAM OXALATE 10 MG/1
TABLET ORAL
Qty: 90 TABLET | Refills: 0 | Status: SHIPPED | OUTPATIENT
Start: 2020-08-04 | End: 2020-11-10

## 2020-08-24 DIAGNOSIS — M16.0 BILATERAL HIP JOINT ARTHRITIS: Primary | ICD-10-CM

## 2020-08-25 ENCOUNTER — OFFICE VISIT (OUTPATIENT)
Dept: PHYSICAL THERAPY | Age: 85
End: 2020-08-25
Attending: PHYSICIAN ASSISTANT
Payer: MEDICARE

## 2020-08-25 DIAGNOSIS — M16.0 BILATERAL HIP JOINT ARTHRITIS: ICD-10-CM

## 2020-08-25 PROCEDURE — 97161 PT EVAL LOW COMPLEX 20 MIN: CPT

## 2020-08-25 PROCEDURE — 97110 THERAPEUTIC EXERCISES: CPT

## 2020-08-25 NOTE — PROGRESS NOTES
LOWER EXTREMITY EVALUATION:   Referring Physician: Dr. Lon Najera  Diagnosis:  Bilateral hip joint arthritis (M16.0)         Date of Service: 8/25/2020     PATIENT SUMMARY   Felix Knapp is a 80year old female who presents to therapy today with complaint (transient ischemic attack). ASSESSMENT  Robert Cheema presents to physical therapy evaluation with primary c/o left hip pain and instability.  The results of the objective tests and measures show impaired ROM, joint mobility, gait, LE flexibility, motor func Axis Distraction (Hip OA): no change      Gait: pt ambulates on level ground with antalgia, decreased step length right and decreased stance phase left  Balance: SLS: R 5 sec, L 4 sec    Today’s Treatment and Response:   Pt education was provided on exam f options and has agreed to actively participate in planning and for this course of care. Thank you for your referral. Please co-sign or sign and return this letter via fax as soon as possible to [phone].  If you have any questions, please contact me

## 2020-08-31 ENCOUNTER — OFFICE VISIT (OUTPATIENT)
Dept: PHYSICAL THERAPY | Age: 85
End: 2020-08-31
Attending: PHYSICIAN ASSISTANT
Payer: MEDICARE

## 2020-08-31 PROCEDURE — 97140 MANUAL THERAPY 1/> REGIONS: CPT

## 2020-08-31 PROCEDURE — 97110 THERAPEUTIC EXERCISES: CPT

## 2020-08-31 NOTE — PROGRESS NOTES
Dx: Bilateral hip joint arthritis (M16.0)                 Insurance (Authorized # of Visits):  Medicare-    8 recommended       Authorizing Physician: Dr. Geraldine Chan  Next MD visit: none scheduled  Fall Risk: standard         Precautions: n/a             Subj Supine Long axis distraction Gr lll-lV 5 x 20 sec bouts       Cupping L lumbar paraspinals x 5 min       Supine SAQ 2x10       Hkly: bilat hip abd with TrA YTB 2x10       Hkly: SKTC 5 x10 sec (manual)       HEP: 8/31/2020 SAQ     Charges: MT 2 Ex 1

## 2020-09-01 DIAGNOSIS — Z85.820 PERSONAL HISTORY OF MALIGNANT MELANOMA OF SKIN: ICD-10-CM

## 2020-09-01 DIAGNOSIS — D22.9 LINEAR SEBACEOUS NEVUS SEQUENCE: Primary | ICD-10-CM

## 2020-09-01 DIAGNOSIS — L82.1 OTHER SEBORRHEIC KERATOSIS: ICD-10-CM

## 2020-09-03 ENCOUNTER — APPOINTMENT (OUTPATIENT)
Dept: PHYSICAL THERAPY | Age: 85
End: 2020-09-03
Attending: PHYSICIAN ASSISTANT
Payer: MEDICARE

## 2020-09-14 ENCOUNTER — APPOINTMENT (OUTPATIENT)
Dept: PHYSICAL THERAPY | Age: 85
End: 2020-09-14
Attending: PHYSICIAN ASSISTANT
Payer: MEDICARE

## 2020-09-21 ENCOUNTER — APPOINTMENT (OUTPATIENT)
Dept: PHYSICAL THERAPY | Age: 85
End: 2020-09-21
Payer: MEDICARE

## 2020-09-22 ENCOUNTER — OFFICE VISIT (OUTPATIENT)
Dept: PHYSICAL THERAPY | Age: 85
End: 2020-09-22
Attending: PHYSICIAN ASSISTANT
Payer: MEDICARE

## 2020-09-22 PROCEDURE — 97140 MANUAL THERAPY 1/> REGIONS: CPT

## 2020-09-22 PROCEDURE — 97112 NEUROMUSCULAR REEDUCATION: CPT

## 2020-09-22 NOTE — PROGRESS NOTES
Dx: Bilateral hip joint arthritis (M16.0)                 Insurance (Authorized # of Visits):  Medicare-    8 recommended       Authorizing Physician: Dr. Vasquez Patino  Next MD visit: none scheduled  Fall Risk: standard         Precautions: n/a             Subj abd with TrA YTB 2x10 Hkly: bilat hip abd with TrA YTB 3x10  Hkly:  TrA with alt LE marches x10 ea      Hkly: SKTC 5 x10 sec (manual)  Hkly: SKTC 5 x10 sec (manual)       L/R s/l: jimmy x10 ea      HEP: 8/31/2020 SAQ   9/22/2020 hip abd in hkly YTB

## 2020-09-29 ENCOUNTER — OFFICE VISIT (OUTPATIENT)
Dept: PHYSICAL THERAPY | Age: 85
End: 2020-09-29
Attending: PHYSICIAN ASSISTANT
Payer: MEDICARE

## 2020-09-29 PROCEDURE — 97140 MANUAL THERAPY 1/> REGIONS: CPT

## 2020-09-29 PROCEDURE — 97112 NEUROMUSCULAR REEDUCATION: CPT

## 2020-09-29 NOTE — PROGRESS NOTES
Dx: Bilateral hip joint arthritis (M16.0)                 Insurance (Authorized # of Visits):  Medicare-    8 recommended       Authorizing Physician: Dr. Isma Stoner  Next MD visit: none scheduled  Fall Risk: standard         Precautions: n/a             Subj lll-lV 5 x 20 sec bouts  Supine Long axis distraction Gr lll-lV 5 x 20 sec bouts Supine Long axis distraction Gr lll-lV 5 x 20 sec bouts, R/L     Cupping L lumbar paraspinals x 5 min Cupping L lumbar paraspinals x 5 min Cupping L/R lumbar paraspinals x 5 m

## 2020-10-06 ENCOUNTER — OFFICE VISIT (OUTPATIENT)
Dept: PHYSICAL THERAPY | Age: 85
End: 2020-10-06
Attending: PHYSICIAN ASSISTANT
Payer: MEDICARE

## 2020-10-06 PROCEDURE — 97140 MANUAL THERAPY 1/> REGIONS: CPT

## 2020-10-06 PROCEDURE — 97112 NEUROMUSCULAR REEDUCATION: CPT

## 2020-10-06 NOTE — PROGRESS NOTES
Dx: Bilateral hip joint arthritis (M16.0)                 Insurance (Authorized # of Visits):  Medicare-    8 recommended       Authorizing Physician: Dr. Damion Carter  Next MD visit: none scheduled  Fall Risk: standard         Precautions: n/a             Subj R/L Hip PROM: IR/ER x5 min  Figure 4 in hooklying mobilizations for ER 10 x 5 sec, R/L R/L Hip PROM: IR/ER x5 min  Figure 4 in hooklying mobilizations for ER 10 x 5 sec, R/L    R s/l: rotational mobilizations Gr ll-lll x 5 min R s/l: rotational mobilizatio

## 2020-10-13 ENCOUNTER — APPOINTMENT (OUTPATIENT)
Dept: PHYSICAL THERAPY | Age: 85
End: 2020-10-13
Payer: MEDICARE

## 2020-10-15 ENCOUNTER — NURSE ONLY (OUTPATIENT)
Dept: FAMILY MEDICINE CLINIC | Facility: CLINIC | Age: 85
End: 2020-10-15
Payer: MEDICARE

## 2020-10-15 PROCEDURE — 90662 IIV NO PRSV INCREASED AG IM: CPT | Performed by: FAMILY MEDICINE

## 2020-10-15 PROCEDURE — G0008 ADMIN INFLUENZA VIRUS VAC: HCPCS | Performed by: FAMILY MEDICINE

## 2020-10-15 RX ORDER — LEVOTHYROXINE SODIUM 0.05 MG/1
50 TABLET ORAL
Qty: 90 TABLET | Refills: 3 | Status: SHIPPED | OUTPATIENT
Start: 2020-10-15 | End: 2022-01-20

## 2020-10-20 ENCOUNTER — APPOINTMENT (OUTPATIENT)
Dept: PHYSICAL THERAPY | Age: 85
End: 2020-10-20
Payer: MEDICARE

## 2020-11-03 ENCOUNTER — APPOINTMENT (OUTPATIENT)
Dept: PHYSICAL THERAPY | Age: 85
End: 2020-11-03
Attending: PHYSICIAN ASSISTANT
Payer: MEDICARE

## 2020-11-09 ENCOUNTER — OFFICE VISIT (OUTPATIENT)
Dept: PHYSICAL THERAPY | Age: 85
End: 2020-11-09
Attending: PHYSICIAN ASSISTANT
Payer: MEDICARE

## 2020-11-09 DIAGNOSIS — F32.5 MAJOR DEPRESSIVE DISORDER IN REMISSION, UNSPECIFIED WHETHER RECURRENT (HCC): ICD-10-CM

## 2020-11-09 DIAGNOSIS — F41.9 ANXIETY: ICD-10-CM

## 2020-11-09 PROCEDURE — 97110 THERAPEUTIC EXERCISES: CPT

## 2020-11-09 PROCEDURE — 97140 MANUAL THERAPY 1/> REGIONS: CPT

## 2020-11-09 NOTE — PROGRESS NOTES
Dx: Bilateral hip joint arthritis (M16.0)                 Insurance (Authorized # of Visits):  Medicare-    8 recommended       Authorizing Physician: Dr. Isma Stoner  Next MD visit: none scheduled  Fall Risk: standard         Precautions: n/a               Karol Maradiaga Dept: [phone].     Sincerely,  Electronically signed by therapist: Masood Reyes PT    [de-identified] certification required: No        Date: 8/31/2020  TX#: 2/8 Date:9/22/2020              TX#: 3/8 Date: 9/29/2020             TX#: 4/8 Date: 10/6/2020 TrA YTB 2x10 Hkly: bilat hip abd with TrA YTB 3x10  Hkly: TrA with alt LE marches x10 ea Hkly: bilat hip abd with TrA RTB 3x10  Hkly: TrA with alt LE marches x10 ea Hkly: bilat hip abd with TrA RTB 3x10  Hkly:  TrA with alt LE marches x15 ea --   Hkly: SKTC

## 2020-11-10 RX ORDER — ESCITALOPRAM OXALATE 10 MG/1
TABLET ORAL
Qty: 90 TABLET | Refills: 0 | Status: SHIPPED | OUTPATIENT
Start: 2020-11-10 | End: 2021-02-05

## 2020-11-11 ENCOUNTER — OFFICE VISIT (OUTPATIENT)
Dept: FAMILY MEDICINE CLINIC | Facility: CLINIC | Age: 85
End: 2020-11-11
Payer: MEDICARE

## 2020-11-11 VITALS
BODY MASS INDEX: 26.4 KG/M2 | HEIGHT: 60.5 IN | DIASTOLIC BLOOD PRESSURE: 80 MMHG | RESPIRATION RATE: 16 BRPM | WEIGHT: 138 LBS | HEART RATE: 68 BPM | OXYGEN SATURATION: 98 % | SYSTOLIC BLOOD PRESSURE: 120 MMHG

## 2020-11-11 DIAGNOSIS — M77.8 THUMB TENDONITIS: ICD-10-CM

## 2020-11-11 DIAGNOSIS — M17.0 PRIMARY OSTEOARTHRITIS OF BOTH KNEES: Primary | ICD-10-CM

## 2020-11-11 PROCEDURE — 3008F BODY MASS INDEX DOCD: CPT | Performed by: FAMILY MEDICINE

## 2020-11-11 PROCEDURE — 99213 OFFICE O/P EST LOW 20 MIN: CPT | Performed by: FAMILY MEDICINE

## 2020-11-11 PROCEDURE — 3079F DIAST BP 80-89 MM HG: CPT | Performed by: FAMILY MEDICINE

## 2020-11-11 PROCEDURE — 3074F SYST BP LT 130 MM HG: CPT | Performed by: FAMILY MEDICINE

## 2020-11-11 RX ORDER — MELATONIN
1 2 TIMES DAILY
Qty: 60 TABLET | Refills: 1 | COMMUNITY
Start: 2020-11-11

## 2020-11-12 NOTE — PROGRESS NOTES
Patient of Dr. Srinivas Mccann is I am meeting for bilateral knee pain. Pain has been going on for years but in recent months has been worsening. No locking. No giving out. No complaints of redness or warmth. No bruising. Mild swelling.   She does state that daily.       • Ascorbic Acid (SUPER C COMPLEX OR) Take by mouth daily. • Chromium Picolinate (CHROMIUM PICOLATE OR) Take by mouth daily.          ALLERGIES:   Amoxicillin, Biaxin [Clarithromycin], Darvon [Propoxyphene], Flexeril [Cyclobenzaprine], Kef steroid injection or referral to orthopedist for cartilage injection. Discussed referral to hand orthopedist for consideration of tendinitis in the thumb.

## 2020-11-18 ENCOUNTER — HOSPITAL ENCOUNTER (OUTPATIENT)
Dept: GENERAL RADIOLOGY | Age: 85
Discharge: HOME | End: 2020-11-18
Attending: FAMILY MEDICINE
Payer: MEDICARE

## 2020-11-18 DIAGNOSIS — M17.0 PRIMARY OSTEOARTHRITIS OF BOTH KNEES: ICD-10-CM

## 2020-11-18 PROCEDURE — 73562 X-RAY EXAM OF KNEE 3: CPT | Performed by: FAMILY MEDICINE

## 2020-12-01 ENCOUNTER — APPOINTMENT (OUTPATIENT)
Dept: PHYSICAL THERAPY | Age: 85
End: 2020-12-01
Payer: MEDICARE

## 2020-12-08 ENCOUNTER — APPOINTMENT (OUTPATIENT)
Dept: PHYSICAL THERAPY | Age: 85
End: 2020-12-08
Payer: MEDICARE

## 2021-02-04 ENCOUNTER — TELEPHONE (OUTPATIENT)
Dept: FAMILY MEDICINE CLINIC | Facility: CLINIC | Age: 86
End: 2021-02-04

## 2021-02-04 NOTE — TELEPHONE ENCOUNTER
Pt would like to know if you recommend she get the covid vaccine with all of her allergies? Please advise.

## 2021-02-04 NOTE — TELEPHONE ENCOUNTER
Yes.  The only known contraindications are an allergic reaction of the first dose or if she has an allergy to PEG 2000.

## 2021-02-04 NOTE — TELEPHONE ENCOUNTER
Pt offered the COVID vaccine at her assisted living center and she wants to know if Dr. Jesse Moncada recommends she receive the vaccine

## 2021-02-05 DIAGNOSIS — F32.5 MAJOR DEPRESSIVE DISORDER IN REMISSION, UNSPECIFIED WHETHER RECURRENT (HCC): ICD-10-CM

## 2021-02-05 DIAGNOSIS — F41.9 ANXIETY: ICD-10-CM

## 2021-02-05 RX ORDER — ESCITALOPRAM OXALATE 10 MG/1
TABLET ORAL
Qty: 90 TABLET | Refills: 0 | Status: SHIPPED | OUTPATIENT
Start: 2021-02-05 | End: 2021-05-03

## 2021-02-25 ENCOUNTER — TELEPHONE (OUTPATIENT)
Dept: FAMILY MEDICINE CLINIC | Facility: CLINIC | Age: 86
End: 2021-02-25

## 2021-02-25 NOTE — TELEPHONE ENCOUNTER
Pt had a lot of mucas and was very tired with first vaccine dose, it has been 9 days (moderna). Pt is wondering if she should get 2nd dose.  Please call

## 2021-03-12 DIAGNOSIS — Z23 NEED FOR VACCINATION: ICD-10-CM

## 2021-03-17 ENCOUNTER — TELEPHONE (OUTPATIENT)
Dept: FAMILY MEDICINE CLINIC | Facility: CLINIC | Age: 86
End: 2021-03-17

## 2021-03-17 DIAGNOSIS — Z85.820 PERSONAL HISTORY OF MALIGNANT MELANOMA OF SKIN: Primary | ICD-10-CM

## 2021-03-17 NOTE — TELEPHONE ENCOUNTER
[address] [address] Clinical Staff  Cc: [facility] Referral Pool  Phone Number: [phone]   . Reason for the order/referral:DERMATOLOGY/F/UP   PCP: [id number]   Refer to Provider Mary Ellen Munroe 112   Patient Insurance: [facility]

## 2021-05-03 DIAGNOSIS — F32.5 MAJOR DEPRESSIVE DISORDER IN REMISSION, UNSPECIFIED WHETHER RECURRENT (HCC): ICD-10-CM

## 2021-05-03 DIAGNOSIS — F41.9 ANXIETY: ICD-10-CM

## 2021-05-03 RX ORDER — ESCITALOPRAM OXALATE 10 MG/1
TABLET ORAL
Qty: 90 TABLET | Refills: 0 | Status: SHIPPED | OUTPATIENT
Start: 2021-05-03 | End: 2021-08-04

## 2021-05-03 NOTE — TELEPHONE ENCOUNTER
Rx Request  ESCITALOPRAM 10 MG Oral Tab    Disp:    90                R: 0    Associated Dx:  Anxiety    Last Refilled: 02/05/2021    Last Visit: 11/11/2020 Next visit 05/20/2021

## 2021-05-20 ENCOUNTER — HOSPITAL ENCOUNTER (OUTPATIENT)
Dept: GENERAL RADIOLOGY | Age: 86
Discharge: HOME | End: 2021-05-20
Attending: FAMILY MEDICINE
Payer: MEDICARE

## 2021-05-20 ENCOUNTER — OFFICE VISIT (OUTPATIENT)
Dept: FAMILY MEDICINE CLINIC | Facility: CLINIC | Age: 86
End: 2021-05-20
Payer: MEDICARE

## 2021-05-20 VITALS
WEIGHT: 144 LBS | RESPIRATION RATE: 16 BRPM | BODY MASS INDEX: 26.5 KG/M2 | HEART RATE: 71 BPM | SYSTOLIC BLOOD PRESSURE: 132 MMHG | OXYGEN SATURATION: 98 % | HEIGHT: 62 IN | DIASTOLIC BLOOD PRESSURE: 68 MMHG

## 2021-05-20 DIAGNOSIS — Z00.00 ENCOUNTER FOR ANNUAL HEALTH EXAMINATION: Primary | ICD-10-CM

## 2021-05-20 DIAGNOSIS — Z78.0 POSTMENOPAUSAL: ICD-10-CM

## 2021-05-20 DIAGNOSIS — I10 ESSENTIAL HYPERTENSION: ICD-10-CM

## 2021-05-20 DIAGNOSIS — M54.6 ACUTE MIDLINE THORACIC BACK PAIN: ICD-10-CM

## 2021-05-20 DIAGNOSIS — E03.9 HYPOTHYROIDISM, UNSPECIFIED TYPE: ICD-10-CM

## 2021-05-20 DIAGNOSIS — F41.9 ANXIETY: Chronic | ICD-10-CM

## 2021-05-20 DIAGNOSIS — M85.80 OSTEOPENIA, UNSPECIFIED LOCATION: ICD-10-CM

## 2021-05-20 DIAGNOSIS — Z13.6 SCREENING FOR CARDIOVASCULAR CONDITION: ICD-10-CM

## 2021-05-20 DIAGNOSIS — R53.83 FATIGUE, UNSPECIFIED TYPE: ICD-10-CM

## 2021-05-20 DIAGNOSIS — I48.0 PAF (PAROXYSMAL ATRIAL FIBRILLATION) (HCC): ICD-10-CM

## 2021-05-20 DIAGNOSIS — E78.2 MIXED HYPERLIPIDEMIA: ICD-10-CM

## 2021-05-20 PROBLEM — D69.6 THROMBOCYTOPENIA (HCC): Chronic | Status: ACTIVE | Noted: 2021-05-20

## 2021-05-20 PROBLEM — F41.0 PANIC ATTACK: Status: RESOLVED | Noted: 2018-04-17 | Resolved: 2021-05-20

## 2021-05-20 PROBLEM — D69.6 THROMBOCYTOPENIA: Chronic | Status: ACTIVE | Noted: 2021-05-20

## 2021-05-20 PROCEDURE — 72072 X-RAY EXAM THORAC SPINE 3VWS: CPT | Performed by: FAMILY MEDICINE

## 2021-05-20 PROCEDURE — 3078F DIAST BP <80 MM HG: CPT | Performed by: FAMILY MEDICINE

## 2021-05-20 PROCEDURE — 96160 PT-FOCUSED HLTH RISK ASSMT: CPT | Performed by: FAMILY MEDICINE

## 2021-05-20 PROCEDURE — G0439 PPPS, SUBSEQ VISIT: HCPCS | Performed by: FAMILY MEDICINE

## 2021-05-20 PROCEDURE — 3008F BODY MASS INDEX DOCD: CPT | Performed by: FAMILY MEDICINE

## 2021-05-20 PROCEDURE — 3075F SYST BP GE 130 - 139MM HG: CPT | Performed by: FAMILY MEDICINE

## 2021-05-20 PROCEDURE — 99397 PER PM REEVAL EST PAT 65+ YR: CPT | Performed by: FAMILY MEDICINE

## 2021-05-20 NOTE — PATIENT INSTRUCTIONS
Alternative to Xarelto is warfarin or Coumadin. Narrow therapeutic range and requires blood test monitoring. Work-up on the Internet EKG for atrial fibrillation and compared to how nice and normal your recent EKGs have looked.     [address] [facility] the following criteria:   • Men who are 73-68 years old and have smoked more than 100 cigarettes in their lifetime   • Anyone with a family history    Colorectal Cancer Screening  Covered up to Age 76     Colonoscopy Screen   Covered every 10 years- more o this Mammogram regularly   Immunizations      Influenza  Covered Annually Orders placed or performed in visit on 10/15/20   • FLU VACC HIGH DOSE PRSV FREE    Please get every year    Pneumococcal 13 (Prevnar)  Covered Once after 65 No orders found for this

## 2021-05-20 NOTE — PROGRESS NOTES
HPI:   Aminata Parents is a 80year old female who presents for a MA (Medicare Advantage) [facility] (Once per calendar year). Thoracic back pain at the midline. Started Monday. Saw the chiropractor. Helped for about 24 hours.   No radiation down the Osteopenia     Statin intolerance     Mixed hyperlipidemia     PAF (paroxysmal atrial fibrillation) (HCC)     Hypothyroidism     Thrombocytopenia ([address]    Wt Readings from Last 3 Encounters:  05/20/21 : 144 lb (65.3 kg)  01/05/21 : 138 lb (62.6 kg)  11/11/ tablet (25 mg total) by mouth nightly. Glucosamine-Chondroitin 500-400 MG Oral Tab, Take 1 tablet by mouth 2 (two) times daily.   Levothyroxine Sodium 50 MCG Oral Tab, Take 1 tablet (50 mcg total) by mouth before breakfast.  XARELTO 20 MG Oral Tab, Take 1 rosendo).    Medicare Hearing Assessment  (Required for AWV/SWV)    Tuning forks audible bilaterally x3         Visual Acuity  Right Eye Visual Acuity: Corrected Right Eye Chart Acuity: 20/25   Left Eye Visual Acuity: Corrected Left Eye Chart Acuity: 20/25   Jeana Davies CHRONIC CONDITIONS:   Nicolás Oshea is a 80year old female who presents for a Medicare Assessment. PLAN SUMMARY:   Diagnoses and all orders for this visit:    Encounter for annual health examination  -     LIPID PANEL;  Future  -     COMP METABOLIC PAN the past six months, have you lost more than 10 pounds without trying?: 2 - No  Has your appetite been poor?: No  How does the patient maintain a good energy level?: Daily Walks  How would you describe your daily physical activity?: Moderate  How would you CHLAMYDIA No flowsheet data found. Screening Mammogram      Mammogram Annually to 76, then as discussed There are no preventive care reminders to display for this patient.  Update Health Maintenance if applicable     Immunizations (Update Immunization Ac

## 2021-05-21 PROBLEM — I63.10 CEREBROVASCULAR ACCIDENT (CVA) DUE TO EMBOLISM OF PRECEREBRAL ARTERY (HCC): Status: RESOLVED | Noted: 2019-01-20 | Resolved: 2021-05-21

## 2021-05-28 ENCOUNTER — HOSPITAL ENCOUNTER (OUTPATIENT)
Dept: BONE DENSITY | Age: 86
Discharge: HOME | End: 2021-05-28
Attending: FAMILY MEDICINE
Payer: MEDICARE

## 2021-05-28 DIAGNOSIS — M85.80 OSTEOPENIA, UNSPECIFIED LOCATION: ICD-10-CM

## 2021-05-28 DIAGNOSIS — Z78.0 POSTMENOPAUSAL: ICD-10-CM

## 2021-05-28 PROCEDURE — 77080 DXA BONE DENSITY AXIAL: CPT | Performed by: FAMILY MEDICINE

## 2021-06-01 ENCOUNTER — LABORATORY ENCOUNTER (OUTPATIENT)
Dept: LAB | Age: 86
End: 2021-06-01
Attending: FAMILY MEDICINE
Payer: MEDICARE

## 2021-06-01 DIAGNOSIS — Z00.00 ENCOUNTER FOR ANNUAL HEALTH EXAMINATION: ICD-10-CM

## 2021-06-01 DIAGNOSIS — E03.9 HYPOTHYROIDISM, UNSPECIFIED TYPE: ICD-10-CM

## 2021-06-01 DIAGNOSIS — E78.2 MIXED HYPERLIPIDEMIA: ICD-10-CM

## 2021-06-01 DIAGNOSIS — Z13.6 SCREENING FOR CARDIOVASCULAR CONDITION: ICD-10-CM

## 2021-06-01 DIAGNOSIS — R53.83 FATIGUE, UNSPECIFIED TYPE: ICD-10-CM

## 2021-06-01 DIAGNOSIS — I10 ESSENTIAL HYPERTENSION: ICD-10-CM

## 2021-06-01 PROCEDURE — 84443 ASSAY THYROID STIM HORMONE: CPT

## 2021-06-01 PROCEDURE — 36415 COLL VENOUS BLD VENIPUNCTURE: CPT

## 2021-06-01 PROCEDURE — 85025 COMPLETE CBC W/AUTO DIFF WBC: CPT

## 2021-06-01 PROCEDURE — 80061 LIPID PANEL: CPT

## 2021-06-01 PROCEDURE — 80053 COMPREHEN METABOLIC PANEL: CPT

## 2021-06-04 ENCOUNTER — OFFICE VISIT (OUTPATIENT)
Dept: FAMILY MEDICINE CLINIC | Facility: CLINIC | Age: 86
End: 2021-06-04
Payer: MEDICARE

## 2021-06-04 VITALS
HEART RATE: 72 BPM | BODY MASS INDEX: 26.5 KG/M2 | WEIGHT: 144 LBS | RESPIRATION RATE: 18 BRPM | SYSTOLIC BLOOD PRESSURE: 110 MMHG | DIASTOLIC BLOOD PRESSURE: 60 MMHG | HEIGHT: 62 IN | OXYGEN SATURATION: 98 %

## 2021-06-04 DIAGNOSIS — G56.03 BILATERAL CARPAL TUNNEL SYNDROME: Primary | ICD-10-CM

## 2021-06-04 PROCEDURE — 3008F BODY MASS INDEX DOCD: CPT | Performed by: FAMILY MEDICINE

## 2021-06-04 PROCEDURE — 3074F SYST BP LT 130 MM HG: CPT | Performed by: FAMILY MEDICINE

## 2021-06-04 PROCEDURE — 99213 OFFICE O/P EST LOW 20 MIN: CPT | Performed by: FAMILY MEDICINE

## 2021-06-04 PROCEDURE — 3078F DIAST BP <80 MM HG: CPT | Performed by: FAMILY MEDICINE

## 2021-06-04 NOTE — PROGRESS NOTES
Has chronic pain in the extensor tendons of the thumbs. This flared up again a few days after I saw her. It rain the next day which tends to be the case with the thumbs. In the last 10 days she has had pain in the palms.  A little bit of swelling which is r PICOLATE OR) Take by mouth daily.          ALLERGIES:   Amoxicillin, Biaxin [Clarithromycin], Darvon [Propoxyphene], Flexeril [Cyclobenzaprine], Keflex [Cephalexin], Latex, [place]ona[place], Medrol [Methylprednisolone], Olive Oil, Paxil [Paroxetine], Pyridin [Phen call me with an update on her symptoms. If no improvement we can continue the overnight splinting.  If not improving consider wrist and hand x-rays bilateral. Further work-up could be undertaken with electromyelogram.         If this note is coded by time b

## 2021-06-04 NOTE — PATIENT INSTRUCTIONS
Possible carpal tunnel syndrome in both wrists. On physical exam you had a positive Tinel's sign. Go to your box store and buy wrist splints with a little bit of flexion like I showed you with the mouse. Wear these overnight for 2 weeks.   Then junior flores

## 2021-06-17 ENCOUNTER — OFFICE VISIT (OUTPATIENT)
Dept: FAMILY MEDICINE CLINIC | Facility: CLINIC | Age: 86
End: 2021-06-17
Payer: MEDICARE

## 2021-06-17 VITALS
DIASTOLIC BLOOD PRESSURE: 60 MMHG | SYSTOLIC BLOOD PRESSURE: 122 MMHG | RESPIRATION RATE: 16 BRPM | HEIGHT: 62 IN | WEIGHT: 142 LBS | BODY MASS INDEX: 26.13 KG/M2 | OXYGEN SATURATION: 98 % | HEART RATE: 79 BPM

## 2021-06-17 DIAGNOSIS — M26.621 ARTHRALGIA OF RIGHT TEMPOROMANDIBULAR JOINT: Primary | ICD-10-CM

## 2021-06-17 PROCEDURE — 3008F BODY MASS INDEX DOCD: CPT | Performed by: FAMILY MEDICINE

## 2021-06-17 PROCEDURE — 99213 OFFICE O/P EST LOW 20 MIN: CPT | Performed by: FAMILY MEDICINE

## 2021-06-17 PROCEDURE — 3078F DIAST BP <80 MM HG: CPT | Performed by: FAMILY MEDICINE

## 2021-06-17 PROCEDURE — 3074F SYST BP LT 130 MM HG: CPT | Performed by: FAMILY MEDICINE

## 2021-06-17 NOTE — PROGRESS NOTES
Here with jaw pain on the right side. Has been her dentist.  Catherene Knee find anything wrong there. X-rays were taken. She denies cracking in the ears or ear pain. No rhinorrhea. No sinus tenderness or pain.     PAST MEDICAL HISTORY:  Past Medical History: Sulfamethoxazole, Synalgos Dc, Ceclor [Cefaclor], Imodium [Loperamide], Crestor [Rosuvastatin], Donnatal [Belladonna Alk-Phenobarbital], Gantrisin [Sulfisoxazole], Lorabid [Loracarbef], Monistat [Miconazole], Sudafed [Pseudoephedrine], Trazodone, Ampicilli

## 2021-06-21 ENCOUNTER — HOSPITAL ENCOUNTER (OUTPATIENT)
Dept: GENERAL RADIOLOGY | Age: 86
Discharge: HOME | End: 2021-06-21
Attending: FAMILY MEDICINE
Payer: MEDICARE

## 2021-06-21 DIAGNOSIS — M26.621 ARTHRALGIA OF RIGHT TEMPOROMANDIBULAR JOINT: ICD-10-CM

## 2021-06-21 PROCEDURE — 70330 X-RAY EXAM OF JAW JOINTS: CPT | Performed by: FAMILY MEDICINE

## 2021-07-06 ENCOUNTER — TELEPHONE (OUTPATIENT)
Dept: FAMILY MEDICINE CLINIC | Facility: CLINIC | Age: 86
End: 2021-07-06

## 2021-07-06 DIAGNOSIS — M25.512 LEFT SHOULDER PAIN, UNSPECIFIED CHRONICITY: Primary | ICD-10-CM

## 2021-07-06 NOTE — TELEPHONE ENCOUNTER
Pt seen 6/17/21, states she had discussed shoulder pain. Nothing noted in notes. Okay to add to pt's TMJ PT she is already going to ? Or do you want to see pt again?

## 2021-07-06 NOTE — TELEPHONE ENCOUNTER
Pt calling to see if she can get a referral to physical therapy  for her L shoulder pain  Pt sees physical therapy for her tmj    She thinks she discussed this issue with Dr. Chayo Mike

## 2021-07-08 NOTE — TELEPHONE ENCOUNTER
Patient called to check status of left shoulder order for PT. She is out of commission and would really like to start it ASAP.

## 2021-07-27 ENCOUNTER — TELEPHONE (OUTPATIENT)
Dept: PHYSICAL THERAPY | Facility: HOSPITAL | Age: 86
End: 2021-07-27

## 2021-07-28 ENCOUNTER — OFFICE VISIT (OUTPATIENT)
Dept: PHYSICAL THERAPY | Age: 86
End: 2021-07-28
Attending: FAMILY MEDICINE
Payer: MEDICARE

## 2021-07-28 DIAGNOSIS — M26.621 ARTHRALGIA OF RIGHT TEMPOROMANDIBULAR JOINT: ICD-10-CM

## 2021-07-28 PROCEDURE — 97140 MANUAL THERAPY 1/> REGIONS: CPT

## 2021-07-28 PROCEDURE — 97162 PT EVAL MOD COMPLEX 30 MIN: CPT

## 2021-07-28 NOTE — PROGRESS NOTES
SHOULDER EVALUATION:   Referring Physician: Dr. 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 Carpal tunnel diagnosis, she is wearing her braces at night, one a  Night for comfort.      Precautions:  None  OBJECTIVE:   Observation/Posture: rounded shoulders with forward head posture - pt very stiff with walking due to low back pain shoulder  Watch posture and stretch the low back after walking    Charges: PT Eval Moderate Complexity, MT 1       Total Timed Treatment: 45 min     Total Treatment Time: 45 min     Based on clinical rationale and outcome measures, this evaluation involved Date____________________    Certification From: 7/93/4056  To:10/26/2021

## 2021-07-30 ENCOUNTER — OFFICE VISIT (OUTPATIENT)
Dept: PHYSICAL THERAPY | Age: 86
End: 2021-07-30
Attending: FAMILY MEDICINE
Payer: MEDICARE

## 2021-07-30 PROCEDURE — 97110 THERAPEUTIC EXERCISES: CPT

## 2021-08-01 NOTE — PROGRESS NOTES
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 Enzo Turner  Next MD visit: none scheduled  Fall Risk: standard         Precautions: n/a             Subjective:  Felt g HEP:  Tongue roof of mouth with opening , ER YTB   pec stretch     Charges:  EX 3        Total Timed Treatment:  45  min  Total Treatment Time:  45 min

## 2021-08-04 ENCOUNTER — OFFICE VISIT (OUTPATIENT)
Dept: PHYSICAL THERAPY | Age: 86
End: 2021-08-04
Attending: FAMILY MEDICINE
Payer: MEDICARE

## 2021-08-04 DIAGNOSIS — F41.9 ANXIETY: ICD-10-CM

## 2021-08-04 DIAGNOSIS — M26.621 ARTHRALGIA OF RIGHT TEMPOROMANDIBULAR JOINT: ICD-10-CM

## 2021-08-04 DIAGNOSIS — F32.5 MAJOR DEPRESSIVE DISORDER IN REMISSION, UNSPECIFIED WHETHER RECURRENT (HCC): ICD-10-CM

## 2021-08-04 PROCEDURE — 97110 THERAPEUTIC EXERCISES: CPT

## 2021-08-04 RX ORDER — ESCITALOPRAM OXALATE 10 MG/1
TABLET ORAL
Qty: 90 TABLET | Refills: 0 | Status: SHIPPED | OUTPATIENT
Start: 2021-08-04 | End: 2021-11-05

## 2021-08-04 NOTE — PROGRESS NOTES
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 Alessandro Haro  Next MD visit: none scheduled  Fall Risk: standard         Precautions: n/a             Subjective:  L hand Stretches with palm of hands for temporalis, buccinator and masseter.                             HEP:  Tongue roof of mouth with opening , ER YTB   pec stretch     Charges:  EX 3        Total Timed Treatment:  45  min  Total Treatment Time:  45 min

## 2021-08-18 ENCOUNTER — OFFICE VISIT (OUTPATIENT)
Dept: PHYSICAL THERAPY | Age: 86
End: 2021-08-18
Attending: FAMILY MEDICINE
Payer: MEDICARE

## 2021-08-18 PROCEDURE — 97110 THERAPEUTIC EXERCISES: CPT

## 2021-08-18 NOTE — PROGRESS NOTES
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 Zain Felder  Next MD visit: none scheduled  Fall Risk: standard         Precautions: n/a             Subjective: pt has upper trap and post shoulder  Internal TMJ STM  PROM L shoulder all planes 10 mins   US B TMJ 5 mins 1.5 w/cm2                               HEP:  Tongue roof of mouth with opening , ER YTB   pec stretch     Charges:  EX 3        Total Timed Treatment:  45

## 2021-08-24 ENCOUNTER — OFFICE VISIT (OUTPATIENT)
Dept: PHYSICAL THERAPY | Age: 86
End: 2021-08-24
Attending: FAMILY MEDICINE
Payer: MEDICARE

## 2021-08-24 PROCEDURE — 97110 THERAPEUTIC EXERCISES: CPT

## 2021-08-24 NOTE — PROGRESS NOTES
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 America Bautista  Next MD visit: none scheduled  Fall Risk: standard         Precautions: n/a             Subjective:  Still buccinator and masseter.   UBE forward 2 mins   Pulleys flex and scap 10 x L     Wall slides 10 x     STM upper trap and post shoulder  Internal TMJ STM  PROM L shoulder all planes 10 mins   US B TMJ 5 mins 1.5 w/cm2      UBE  2 mins in each direction  Sidra

## 2021-08-26 ENCOUNTER — APPOINTMENT (OUTPATIENT)
Dept: PHYSICAL THERAPY | Age: 86
End: 2021-08-26
Attending: FAMILY MEDICINE
Payer: MEDICARE

## 2021-08-31 ENCOUNTER — APPOINTMENT (OUTPATIENT)
Dept: PHYSICAL THERAPY | Age: 86
End: 2021-08-31
Attending: FAMILY MEDICINE
Payer: MEDICARE

## 2021-09-14 ENCOUNTER — OFFICE VISIT (OUTPATIENT)
Dept: PHYSICAL THERAPY | Age: 86
End: 2021-09-14
Attending: FAMILY MEDICINE
Payer: MEDICARE

## 2021-09-14 PROCEDURE — 97110 THERAPEUTIC EXERCISES: CPT

## 2021-09-14 NOTE — PROGRESS NOTES
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 Frederico Dandy  Next MD visit: none scheduled  Fall Risk: standard         Precautions: n/a             Subjective:   My sh Side lying ER - pain L 10 x   Bicep curls   Hammer curls 1 # 10  X   STM masseter on the L today in supine   Stretches with palm of hands for temporalis, buccinator and masseter.   UBE forward 2 mins   Pulleys flex and scap 10 x L     Wall slides 10 x

## 2021-10-01 ENCOUNTER — OFFICE VISIT (OUTPATIENT)
Dept: PHYSICAL THERAPY | Age: 86
End: 2021-10-01
Attending: FAMILY MEDICINE
Payer: MEDICARE

## 2021-10-01 PROCEDURE — 97110 THERAPEUTIC EXERCISES: CPT

## 2021-10-01 NOTE — PROGRESS NOTES
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 Sil Norris  Next MD visit: none scheduled  Fall Risk: standard         Precautions: n/a            discharge  Summary Re-assessment  STM L shoulder post  Long head bicep tendon  Internal R TMJ 15 mins   Attempted passive ROM on the L   Rows- painful on the L   IR- painful  ER - YTB 10 x on the L   Tongue roof of mouth 10 x open mouth   pec stretch 3 x 30 secs   CP L juan

## 2021-10-05 ENCOUNTER — NURSE ONLY (OUTPATIENT)
Dept: FAMILY MEDICINE CLINIC | Facility: CLINIC | Age: 86
End: 2021-10-05
Payer: MEDICARE

## 2021-10-05 PROCEDURE — G0008 ADMIN INFLUENZA VIRUS VAC: HCPCS | Performed by: FAMILY MEDICINE

## 2021-10-05 PROCEDURE — 90662 IIV NO PRSV INCREASED AG IM: CPT | Performed by: FAMILY MEDICINE

## 2021-10-14 NOTE — PROGRESS NOTES
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 Dex Lorenz  Next MD visit: none scheduled  Fall Risk: standard         Precautions: n/a            discharge  Summary head bicep tendon  Internal R TMJ 15 mins   Attempted passive ROM on the L   Rows- painful on the L   IR- painful  ER - YTB 10 x on the L   Tongue roof of mouth 10 x open mouth   pec stretch 3 x 30 secs   CP L shoulder 10 mins  PROM B shoulder to 90 degs

## 2021-11-05 DIAGNOSIS — F32.5 MAJOR DEPRESSIVE DISORDER IN REMISSION, UNSPECIFIED WHETHER RECURRENT (HCC): ICD-10-CM

## 2021-11-05 DIAGNOSIS — F41.9 ANXIETY: ICD-10-CM

## 2021-11-05 RX ORDER — ESCITALOPRAM OXALATE 10 MG/1
TABLET ORAL
Qty: 90 TABLET | Refills: 0 | Status: SHIPPED | OUTPATIENT
Start: 2021-11-05 | End: 2022-02-04

## 2022-01-20 RX ORDER — LEVOTHYROXINE SODIUM 0.05 MG/1
50 TABLET ORAL
Qty: 90 TABLET | Refills: 3 | Status: SHIPPED | OUTPATIENT
Start: 2022-01-20

## 2022-02-04 RX ORDER — ESCITALOPRAM OXALATE 10 MG/1
TABLET ORAL
Qty: 90 TABLET | Refills: 0 | Status: SHIPPED | OUTPATIENT
Start: 2022-02-04

## 2022-02-10 ENCOUNTER — HOSPITAL ENCOUNTER (EMERGENCY)
Facility: HOSPITAL | Age: 87
Discharge: HOME | End: 2022-02-10
Attending: EMERGENCY MEDICINE
Payer: MEDICARE

## 2022-02-10 VITALS
RESPIRATION RATE: 17 BRPM | DIASTOLIC BLOOD PRESSURE: 82 MMHG | HEART RATE: 75 BPM | OXYGEN SATURATION: 96 % | BODY MASS INDEX: 26.13 KG/M2 | SYSTOLIC BLOOD PRESSURE: 174 MMHG | HEIGHT: 62 IN | WEIGHT: 142 LBS | TEMPERATURE: 98 F

## 2022-02-10 DIAGNOSIS — F41.1 ANXIETY REACTION: ICD-10-CM

## 2022-02-10 DIAGNOSIS — I10 HYPERTENSION, UNSPECIFIED TYPE: Primary | ICD-10-CM

## 2022-02-10 LAB
ALBUMIN SERPL-MCNC: 3.7 G/DL (ref 3.4–5)
ALBUMIN/GLOB SERPL: 1.1 {RATIO} (ref 1–2)
ALP LIVER SERPL-CCNC: 60 U/L
ALT SERPL-CCNC: 25 U/L
ANION GAP SERPL CALC-SCNC: 7 MMOL/L (ref 0–18)
AST SERPL-CCNC: 24 U/L (ref 15–37)
BASOPHILS # BLD AUTO: 0.03 X10(3) UL (ref 0–0.2)
BASOPHILS NFR BLD AUTO: 0.5 %
BILIRUB SERPL-MCNC: 0.2 MG/DL (ref 0.1–2)
BILIRUB UR QL STRIP.AUTO: NEGATIVE
BUN BLD-MCNC: 17 MG/DL (ref 7–18)
CALCIUM BLD-MCNC: 9.8 MG/DL (ref 8.5–10.1)
CHLORIDE SERPL-SCNC: 105 MMOL/L (ref 98–112)
CO2 SERPL-SCNC: 25 MMOL/L (ref 21–32)
COLOR UR AUTO: YELLOW
CREAT BLD-MCNC: 0.87 MG/DL
EOSINOPHIL # BLD AUTO: 0.17 X10(3) UL (ref 0–0.7)
EOSINOPHIL NFR BLD AUTO: 2.8 %
ERYTHROCYTE [DISTWIDTH] IN BLOOD BY AUTOMATED COUNT: 12.7 %
GLOBULIN PLAS-MCNC: 3.5 G/DL (ref 2.8–4.4)
GLUCOSE BLD-MCNC: 130 MG/DL (ref 70–99)
GLUCOSE UR STRIP.AUTO-MCNC: NEGATIVE MG/DL
HCT VFR BLD AUTO: 37.5 %
HGB BLD-MCNC: 12.9 G/DL
IMM GRANULOCYTES # BLD AUTO: 0.02 X10(3) UL (ref 0–1)
IMM GRANULOCYTES NFR BLD: 0.3 %
KETONES UR STRIP.AUTO-MCNC: NEGATIVE MG/DL
LEUKOCYTE ESTERASE UR QL STRIP.AUTO: NEGATIVE
LYMPHOCYTES # BLD AUTO: 1.83 X10(3) UL (ref 1–4)
LYMPHOCYTES NFR BLD AUTO: 30.5 %
MCH RBC QN AUTO: 28.6 PG (ref 26–34)
MCHC RBC AUTO-ENTMCNC: 34.4 G/DL (ref 31–37)
MCV RBC AUTO: 83.1 FL
MONOCYTES # BLD AUTO: 0.42 X10(3) UL (ref 0.1–1)
MONOCYTES NFR BLD AUTO: 7 %
NEUTROPHILS # BLD AUTO: 3.53 X10 (3) UL (ref 1.5–7.7)
NEUTROPHILS # BLD AUTO: 3.53 X10(3) UL (ref 1.5–7.7)
NEUTROPHILS NFR BLD AUTO: 58.9 %
NITRITE UR QL STRIP.AUTO: NEGATIVE
OSMOLALITY SERPL CALC.SUM OF ELEC: 287 MOSM/KG (ref 275–295)
PH UR STRIP.AUTO: 7 [PH] (ref 5–8)
PLATELET # BLD AUTO: 185 10(3)UL (ref 150–450)
POTASSIUM SERPL-SCNC: 3.8 MMOL/L (ref 3.5–5.1)
PROT SERPL-MCNC: 7.2 G/DL (ref 6.4–8.2)
PROT UR STRIP.AUTO-MCNC: NEGATIVE MG/DL
RBC # BLD AUTO: 4.51 X10(6)UL
RBC UR QL AUTO: NEGATIVE
SODIUM SERPL-SCNC: 137 MMOL/L (ref 136–145)
SP GR UR STRIP.AUTO: 1 (ref 1–1.03)
TROPONIN I HIGH SENSITIVITY: 4 NG/L
UROBILINOGEN UR STRIP.AUTO-MCNC: <2 MG/DL
WBC # BLD AUTO: 6 X10(3) UL (ref 4–11)

## 2022-02-10 PROCEDURE — 99284 EMERGENCY DEPT VISIT MOD MDM: CPT

## 2022-02-10 PROCEDURE — 93010 ELECTROCARDIOGRAM REPORT: CPT

## 2022-02-10 PROCEDURE — 85025 COMPLETE CBC W/AUTO DIFF WBC: CPT | Performed by: EMERGENCY MEDICINE

## 2022-02-10 PROCEDURE — 36415 COLL VENOUS BLD VENIPUNCTURE: CPT

## 2022-02-10 PROCEDURE — 81001 URINALYSIS AUTO W/SCOPE: CPT | Performed by: EMERGENCY MEDICINE

## 2022-02-10 PROCEDURE — 80053 COMPREHEN METABOLIC PANEL: CPT | Performed by: EMERGENCY MEDICINE

## 2022-02-10 PROCEDURE — 93005 ELECTROCARDIOGRAM TRACING: CPT

## 2022-02-10 PROCEDURE — 84484 ASSAY OF TROPONIN QUANT: CPT | Performed by: EMERGENCY MEDICINE

## 2022-02-10 PROCEDURE — 99283 EMERGENCY DEPT VISIT LOW MDM: CPT

## 2022-02-12 LAB
ATRIAL RATE: 80 BPM
P AXIS: 59 DEGREES
P-R INTERVAL: 148 MS
Q-T INTERVAL: 388 MS
QRS DURATION: 86 MS
QTC CALCULATION (BEZET): 447 MS
R AXIS: 7 DEGREES
T AXIS: 38 DEGREES
VENTRICULAR RATE: 80 BPM

## 2022-03-16 ENCOUNTER — OFFICE VISIT (OUTPATIENT)
Dept: FAMILY MEDICINE CLINIC | Facility: CLINIC | Age: 87
End: 2022-03-16
Payer: MEDICARE

## 2022-03-16 VITALS
OXYGEN SATURATION: 97 % | HEART RATE: 85 BPM | BODY MASS INDEX: 27.6 KG/M2 | WEIGHT: 146.19 LBS | RESPIRATION RATE: 16 BRPM | DIASTOLIC BLOOD PRESSURE: 70 MMHG | HEIGHT: 61 IN | SYSTOLIC BLOOD PRESSURE: 138 MMHG

## 2022-03-16 DIAGNOSIS — M25.521 ELBOW PAIN, CHRONIC, RIGHT: ICD-10-CM

## 2022-03-16 DIAGNOSIS — G89.29 ELBOW PAIN, CHRONIC, RIGHT: ICD-10-CM

## 2022-03-16 DIAGNOSIS — I10 ESSENTIAL HYPERTENSION: ICD-10-CM

## 2022-03-16 DIAGNOSIS — E03.9 HYPOTHYROIDISM, UNSPECIFIED TYPE: ICD-10-CM

## 2022-03-16 DIAGNOSIS — M19.019 SHOULDER ARTHRITIS: Primary | ICD-10-CM

## 2022-03-16 PROCEDURE — 3008F BODY MASS INDEX DOCD: CPT | Performed by: FAMILY MEDICINE

## 2022-03-16 PROCEDURE — 3075F SYST BP GE 130 - 139MM HG: CPT | Performed by: FAMILY MEDICINE

## 2022-03-16 PROCEDURE — 99214 OFFICE O/P EST MOD 30 MIN: CPT | Performed by: FAMILY MEDICINE

## 2022-03-16 PROCEDURE — 3078F DIAST BP <80 MM HG: CPT | Performed by: FAMILY MEDICINE

## 2022-03-17 ENCOUNTER — LAB ENCOUNTER (OUTPATIENT)
Dept: LAB | Age: 87
End: 2022-03-17
Attending: FAMILY MEDICINE
Payer: MEDICARE

## 2022-03-17 ENCOUNTER — HOSPITAL ENCOUNTER (OUTPATIENT)
Dept: GENERAL RADIOLOGY | Age: 87
Discharge: HOME | End: 2022-03-17
Attending: FAMILY MEDICINE
Payer: MEDICARE

## 2022-03-17 DIAGNOSIS — M25.521 ELBOW PAIN, CHRONIC, RIGHT: ICD-10-CM

## 2022-03-17 DIAGNOSIS — E03.9 HYPOTHYROIDISM, UNSPECIFIED TYPE: ICD-10-CM

## 2022-03-17 DIAGNOSIS — I10 ESSENTIAL HYPERTENSION: ICD-10-CM

## 2022-03-17 DIAGNOSIS — G89.29 ELBOW PAIN, CHRONIC, RIGHT: ICD-10-CM

## 2022-03-17 DIAGNOSIS — M19.019 SHOULDER ARTHRITIS: ICD-10-CM

## 2022-03-17 LAB
CORTIS SERPL-MCNC: 12.4 UG/DL
T4 FREE SERPL-MCNC: 0.9 NG/DL (ref 0.8–1.7)
TSI SER-ACNC: 1.97 MIU/ML (ref 0.36–3.74)

## 2022-03-17 PROCEDURE — 84439 ASSAY OF FREE THYROXINE: CPT

## 2022-03-17 PROCEDURE — 73030 X-RAY EXAM OF SHOULDER: CPT | Performed by: FAMILY MEDICINE

## 2022-03-17 PROCEDURE — 73080 X-RAY EXAM OF ELBOW: CPT | Performed by: FAMILY MEDICINE

## 2022-03-17 PROCEDURE — 36415 COLL VENOUS BLD VENIPUNCTURE: CPT

## 2022-03-17 PROCEDURE — 84443 ASSAY THYROID STIM HORMONE: CPT

## 2022-03-17 PROCEDURE — 82533 TOTAL CORTISOL: CPT

## 2022-03-21 ENCOUNTER — LAB ENCOUNTER (OUTPATIENT)
Dept: LAB | Age: 87
End: 2022-03-21
Attending: FAMILY MEDICINE
Payer: MEDICARE

## 2022-03-21 PROCEDURE — 83835 ASSAY OF METANEPHRINES: CPT

## 2022-03-24 LAB
CREATININE, URINE - PER VOLUME: 34 MG/DL
HOURS COLLECTED: 24 HR
METANEPHRINE, UR- RATIO TO CRT: 65 UG/G CRT
METANEPHRINE, URINE - PER 24H: 64 UG/D
METANEPHRINE, URINE-PER VOLUME: 22 UG/L
NORMETANEPHRINE, UR-PER VOLUME: 90 UG/L
NORMETANEPHRINE, URINE - RATIO: 265 UG/G CRT
NORMETANEPHRINE, URINE-PER 24H: 261 UG/D
TOTAL VOLUME: 2900 ML

## 2022-04-04 ENCOUNTER — OFFICE VISIT (OUTPATIENT)
Dept: FAMILY MEDICINE CLINIC | Facility: CLINIC | Age: 87
End: 2022-04-04
Payer: MEDICARE

## 2022-04-04 VITALS
DIASTOLIC BLOOD PRESSURE: 60 MMHG | OXYGEN SATURATION: 98 % | BODY MASS INDEX: 27.79 KG/M2 | HEIGHT: 61 IN | HEART RATE: 79 BPM | SYSTOLIC BLOOD PRESSURE: 120 MMHG | WEIGHT: 147.19 LBS | RESPIRATION RATE: 16 BRPM

## 2022-04-04 DIAGNOSIS — M85.80 OSTEOPENIA, UNSPECIFIED LOCATION: ICD-10-CM

## 2022-04-04 DIAGNOSIS — I48.0 PAF (PAROXYSMAL ATRIAL FIBRILLATION) (HCC): ICD-10-CM

## 2022-04-04 DIAGNOSIS — M65.221 CALCIFIC TENDONITIS OF RIGHT UPPER ARM: ICD-10-CM

## 2022-04-04 DIAGNOSIS — F41.9 ANXIETY: ICD-10-CM

## 2022-04-04 DIAGNOSIS — E78.2 MIXED HYPERLIPIDEMIA: ICD-10-CM

## 2022-04-04 DIAGNOSIS — I10 ESSENTIAL HYPERTENSION: Primary | ICD-10-CM

## 2022-04-04 DIAGNOSIS — Z00.00 ENCOUNTER FOR ANNUAL HEALTH EXAMINATION: ICD-10-CM

## 2022-04-04 DIAGNOSIS — E03.9 HYPOTHYROIDISM, UNSPECIFIED TYPE: ICD-10-CM

## 2022-04-04 PROBLEM — D69.6 THROMBOCYTOPENIA: Chronic | Status: RESOLVED | Noted: 2021-05-20 | Resolved: 2022-04-04

## 2022-04-04 PROBLEM — M26.621 ARTHRALGIA OF RIGHT TEMPOROMANDIBULAR JOINT: Status: RESOLVED | Noted: 2021-06-17 | Resolved: 2022-04-04

## 2022-04-04 PROBLEM — D69.6 THROMBOCYTOPENIA (HCC): Chronic | Status: RESOLVED | Noted: 2021-05-20 | Resolved: 2022-04-04

## 2022-04-04 PROCEDURE — 99397 PER PM REEVAL EST PAT 65+ YR: CPT | Performed by: FAMILY MEDICINE

## 2022-04-04 PROCEDURE — G0439 PPPS, SUBSEQ VISIT: HCPCS | Performed by: FAMILY MEDICINE

## 2022-04-04 PROCEDURE — 3008F BODY MASS INDEX DOCD: CPT | Performed by: FAMILY MEDICINE

## 2022-04-04 PROCEDURE — 96160 PT-FOCUSED HLTH RISK ASSMT: CPT | Performed by: FAMILY MEDICINE

## 2022-04-04 PROCEDURE — 3074F SYST BP LT 130 MM HG: CPT | Performed by: FAMILY MEDICINE

## 2022-04-04 PROCEDURE — 3078F DIAST BP <80 MM HG: CPT | Performed by: FAMILY MEDICINE

## 2022-05-05 RX ORDER — ESCITALOPRAM OXALATE 10 MG/1
TABLET ORAL
Qty: 90 TABLET | Refills: 0 | Status: SHIPPED | OUTPATIENT
Start: 2022-05-05

## 2022-05-11 ENCOUNTER — HOSPITAL ENCOUNTER (EMERGENCY)
Facility: HOSPITAL | Age: 87
Discharge: HOME | End: 2022-05-11
Attending: STUDENT IN AN ORGANIZED HEALTH CARE EDUCATION/TRAINING PROGRAM
Payer: MEDICARE

## 2022-05-11 ENCOUNTER — APPOINTMENT (OUTPATIENT)
Dept: CT IMAGING | Facility: HOSPITAL | Age: 87
End: 2022-05-11
Attending: STUDENT IN AN ORGANIZED HEALTH CARE EDUCATION/TRAINING PROGRAM
Payer: MEDICARE

## 2022-05-11 ENCOUNTER — HOSPITAL ENCOUNTER (OUTPATIENT)
Age: 87
Discharge: ANOTHER HOSPITAL | End: 2022-05-11
Attending: EMERGENCY MEDICINE
Payer: MEDICARE

## 2022-05-11 VITALS
BODY MASS INDEX: 26.68 KG/M2 | DIASTOLIC BLOOD PRESSURE: 80 MMHG | HEIGHT: 62 IN | SYSTOLIC BLOOD PRESSURE: 152 MMHG | WEIGHT: 145 LBS | HEART RATE: 85 BPM | OXYGEN SATURATION: 96 % | RESPIRATION RATE: 16 BRPM | TEMPERATURE: 97 F

## 2022-05-11 VITALS
DIASTOLIC BLOOD PRESSURE: 66 MMHG | SYSTOLIC BLOOD PRESSURE: 156 MMHG | BODY MASS INDEX: 26.5 KG/M2 | OXYGEN SATURATION: 97 % | TEMPERATURE: 98 F | HEART RATE: 90 BPM | WEIGHT: 144 LBS | HEIGHT: 62 IN | RESPIRATION RATE: 18 BRPM

## 2022-05-11 DIAGNOSIS — R20.2 PARESTHESIAS: Primary | ICD-10-CM

## 2022-05-11 DIAGNOSIS — I48.0 PAROXYSMAL ATRIAL FIBRILLATION (HCC): ICD-10-CM

## 2022-05-11 LAB
#MXD IC: 0.4 X10ˆ3/UL (ref 0.1–1)
ALBUMIN SERPL-MCNC: 3.6 G/DL (ref 3.4–5)
ALBUMIN/GLOB SERPL: 1 {RATIO} (ref 1–2)
ALP LIVER SERPL-CCNC: 65 U/L
ALT SERPL-CCNC: 23 U/L
ANION GAP SERPL CALC-SCNC: 7 MMOL/L (ref 0–18)
AST SERPL-CCNC: 18 U/L (ref 15–37)
BASOPHILS # BLD AUTO: 0.04 X10(3) UL (ref 0–0.2)
BASOPHILS NFR BLD AUTO: 0.7 %
BILIRUB SERPL-MCNC: 0.3 MG/DL (ref 0.1–2)
BUN BLD-MCNC: 15 MG/DL (ref 7–18)
BUN BLD-MCNC: 17 MG/DL (ref 7–18)
CALCIUM BLD-MCNC: 9.4 MG/DL (ref 8.5–10.1)
CHLORIDE BLD-SCNC: 103 MMOL/L (ref 98–112)
CHLORIDE SERPL-SCNC: 107 MMOL/L (ref 98–112)
CO2 BLD-SCNC: 23 MMOL/L (ref 21–32)
CO2 SERPL-SCNC: 25 MMOL/L (ref 21–32)
CREAT BLD-MCNC: 0.64 MG/DL
CREAT BLD-MCNC: 0.7 MG/DL
EOSINOPHIL # BLD AUTO: 0.14 X10(3) UL (ref 0–0.7)
EOSINOPHIL NFR BLD AUTO: 2.3 %
ERYTHROCYTE [DISTWIDTH] IN BLOOD BY AUTOMATED COUNT: 12.3 %
GLOBULIN PLAS-MCNC: 3.5 G/DL (ref 2.8–4.4)
GLUCOSE BLD-MCNC: 106 MG/DL (ref 70–99)
GLUCOSE BLD-MCNC: 110 MG/DL (ref 70–99)
HCT VFR BLD AUTO: 36.7 %
HCT VFR BLD AUTO: 37 %
HCT VFR BLD CALC: 35 %
HGB BLD-MCNC: 12.2 G/DL
HGB BLD-MCNC: 12.9 G/DL
IMM GRANULOCYTES # BLD AUTO: 0.01 X10(3) UL (ref 0–1)
IMM GRANULOCYTES NFR BLD: 0.2 %
ISTAT IONIZED CALCIUM FOR CHEM 8: 1.28 MMOL/L (ref 1.12–1.32)
LYMPHOCYTES # BLD AUTO: 1.7 X10ˆ3/UL (ref 1–4)
LYMPHOCYTES # BLD AUTO: 1.82 X10(3) UL (ref 1–4)
LYMPHOCYTES NFR BLD AUTO: 29.6 %
LYMPHOCYTES NFR BLD AUTO: 29.8 %
MCH RBC QN AUTO: 28.4 PG (ref 26–34)
MCH RBC QN AUTO: 29.1 PG (ref 26–34)
MCHC RBC AUTO-ENTMCNC: 33.2 G/DL (ref 31–37)
MCHC RBC AUTO-ENTMCNC: 34.9 G/DL (ref 31–37)
MCV RBC AUTO: 83.3 FL
MCV RBC AUTO: 85.5 FL (ref 80–100)
MIXED CELL %: 6.7 %
MONOCYTES # BLD AUTO: 0.33 X10(3) UL (ref 0.1–1)
MONOCYTES NFR BLD AUTO: 5.4 %
NEUTROPHILS # BLD AUTO: 3.7 X10ˆ3/UL (ref 1.5–7.7)
NEUTROPHILS # BLD AUTO: 3.77 X10 (3) UL (ref 1.5–7.7)
NEUTROPHILS # BLD AUTO: 3.77 X10(3) UL (ref 1.5–7.7)
NEUTROPHILS NFR BLD AUTO: 61.6 %
NEUTROPHILS NFR BLD AUTO: 63.7 %
OSMOLALITY SERPL CALC.SUM OF ELEC: 289 MOSM/KG (ref 275–295)
PLATELET # BLD AUTO: 195 10(3)UL (ref 150–450)
PLATELET # BLD AUTO: 211 X10ˆ3/UL (ref 150–450)
POTASSIUM BLD-SCNC: 4.1 MMOL/L (ref 3.6–5.1)
POTASSIUM SERPL-SCNC: 3.9 MMOL/L (ref 3.5–5.1)
PROT SERPL-MCNC: 7.1 G/DL (ref 6.4–8.2)
RBC # BLD AUTO: 4.29 X10ˆ6/UL
RBC # BLD AUTO: 4.44 X10(6)UL
SODIUM BLD-SCNC: 137 MMOL/L (ref 136–145)
SODIUM SERPL-SCNC: 139 MMOL/L (ref 136–145)
TROPONIN I BLD-MCNC: <0.02 NG/ML
WBC # BLD AUTO: 5.8 X10ˆ3/UL (ref 4–11)
WBC # BLD AUTO: 6.1 X10(3) UL (ref 4–11)

## 2022-05-11 PROCEDURE — 85025 COMPLETE CBC W/AUTO DIFF WBC: CPT | Performed by: EMERGENCY MEDICINE

## 2022-05-11 PROCEDURE — 99284 EMERGENCY DEPT VISIT MOD MDM: CPT

## 2022-05-11 PROCEDURE — 93005 ELECTROCARDIOGRAM TRACING: CPT

## 2022-05-11 PROCEDURE — 36415 COLL VENOUS BLD VENIPUNCTURE: CPT

## 2022-05-11 PROCEDURE — 93010 ELECTROCARDIOGRAM REPORT: CPT

## 2022-05-11 PROCEDURE — 70450 CT HEAD/BRAIN W/O DYE: CPT | Performed by: STUDENT IN AN ORGANIZED HEALTH CARE EDUCATION/TRAINING PROGRAM

## 2022-05-11 PROCEDURE — 80047 BASIC METABLC PNL IONIZED CA: CPT

## 2022-05-11 PROCEDURE — 99285 EMERGENCY DEPT VISIT HI MDM: CPT

## 2022-05-11 PROCEDURE — 99214 OFFICE O/P EST MOD 30 MIN: CPT

## 2022-05-11 PROCEDURE — 84484 ASSAY OF TROPONIN QUANT: CPT

## 2022-05-11 PROCEDURE — 80053 COMPREHEN METABOLIC PANEL: CPT | Performed by: STUDENT IN AN ORGANIZED HEALTH CARE EDUCATION/TRAINING PROGRAM

## 2022-05-11 RX ORDER — LORAZEPAM 2 MG/ML
0.5 INJECTION INTRAMUSCULAR ONCE
Status: DISCONTINUED | OUTPATIENT
Start: 2022-05-11 | End: 2022-05-12

## 2022-05-11 NOTE — ED INITIAL ASSESSMENT (HPI)
Pt states intermittent left arm numbness since Monday evening at 2300. Pt went to [facility] today and BP was elevated at 156/101, pt sent here for further evaluation. Pt has left AC IV in place. FAST exam negative.

## 2022-05-11 NOTE — ED INITIAL ASSESSMENT (HPI)
Pt with hx of stroke 2019    Pt with L arm numbness and tingling 2 nights ago, last night and a few minutes ago, nausea when the numbness, some sob    No chest pain

## 2022-05-11 NOTE — ED QUICK NOTES
[facility] et[facility] for an ALS ambulance is 2 hours  [facility] for an ALS ambulance is 2.5 hours  [facility] eta for an ALS ambulance is 2 hours  [facility] for an ALS ambulance is 3 hours  MD was notified

## 2022-05-12 NOTE — ED QUICK NOTES
Pt states she is going to stay with her daughter tonight and doesn't have her medicine there, is concerned she will miss her dose of xarelto that she takes at night, Dr. Thomas Crum notified, ok to order her xeralto and give it before she leaves.

## 2022-05-12 NOTE — ED QUICK NOTES
Pt refusing mri with contrast. Pt states she has never had a reaction to contrast but \"that is the kind of stuff i'm allergic to\".  Pt \"has to go with my gut\" and not receive contrast. Dr. Shanelle Calvin notified

## 2022-05-13 LAB
ATRIAL RATE: 74 BPM
ATRIAL RATE: 82 BPM
P AXIS: 58 DEGREES
P AXIS: 62 DEGREES
P-R INTERVAL: 142 MS
P-R INTERVAL: 150 MS
Q-T INTERVAL: 380 MS
Q-T INTERVAL: 416 MS
QRS DURATION: 76 MS
QRS DURATION: 84 MS
QTC CALCULATION (BEZET): 443 MS
QTC CALCULATION (BEZET): 461 MS
R AXIS: 24 DEGREES
R AXIS: 27 DEGREES
T AXIS: 32 DEGREES
T AXIS: 47 DEGREES
VENTRICULAR RATE: 74 BPM
VENTRICULAR RATE: 82 BPM

## 2022-05-18 ENCOUNTER — TELEPHONE (OUTPATIENT)
Dept: FAMILY MEDICINE CLINIC | Facility: CLINIC | Age: 87
End: 2022-05-18

## 2022-05-18 NOTE — TELEPHONE ENCOUNTER
Spoke with patient, she states she was in ER on 5/11/22, and states she knows she caught covid there, on Friday 5/13 sore throat started, on 5th day of symptoms. Took test Sunday morning, + covid. Cough has improved, + tired, less mucous production. She states she spoke with her children about covid medication treatment, discussed locations offering covid IV infusion, she states she cannot go back to ER or UC, discussed symptoms to watch for that would warrant ER, she states she would just call 911 if she gets worse. Discussed supportive measures. KRISTEN and SUZANNA as fyi, she made appt with [facility] to discuss covid + and treatment, nursing can call her to check on her tomorrow.

## 2022-05-18 NOTE — TELEPHONE ENCOUNTER
Pt asking to discuss her COVID sx. Offered appt but Dr. Gildardo Canchola has no opening this week.   Pt wants to speak with nurse

## 2022-05-18 NOTE — TELEPHONE ENCOUNTER
Noted, will discuss more at appt but we could offer her oral paxlovid, the sooner this is started the better.

## 2022-05-19 NOTE — TELEPHONE ENCOUNTER
There is an interaction with her blood thinner that makes this not safe for her to take. At this point she should continue supportive care and we should monitor her every 2 days until she improves.

## 2022-05-19 NOTE — TELEPHONE ENCOUNTER
Pt notified and expressed understanding. Has appt tomorrow to discuss  Aware to go to ER if SOB, high fever or any worsening symptoms. Pt understood.

## 2022-05-20 ENCOUNTER — VIRTUAL PHONE E/M (OUTPATIENT)
Dept: FAMILY MEDICINE CLINIC | Facility: CLINIC | Age: 87
End: 2022-05-20
Payer: MEDICARE

## 2022-05-20 DIAGNOSIS — U07.1 COVID-19 VIRUS INFECTION: Primary | ICD-10-CM

## 2022-05-20 NOTE — PROGRESS NOTES
Virtual Telephone Check-In    Jeanie Jasso verbally consents to a Virtual/Telephone Check-In visit on 05/20/22. Patient has been referred to the [facility] website at [email or web address] to review the yearly Consent to Treat document. Patient understands and accepts financial responsibility for any deductible, co-insurance and/or co-pays associated with this service. Duration of the service: 12 minutes      Summary of topics discussed:   Patient presents for follow up of COVID-19 infection. She tested positive over 1 week ago. She was in the ER for another concern and a few days after that developed symptoms including sore throat, dry cough, wheezing, and low-grade fever. She had nausea but that has been improving. No abdominal pain, vomiting, diarrhea. She has been taking Robitussin and Tylenol with some relief. She feels about 50% better than at onset. We attempted to give her Paxil bid but there was an interaction with her blood thinners so she has just been treating it with supportive measures. 1. COVID-19 virus infection  Patient has been gradually improving. Continue supportive care. Discussed supplements including vitamin D, zinc, quercetin that she can take. We will follow-up with her in 2 to 3 days for condition update but if anything worsens in the meantime she should contact the office or go back to the ER.         Donna Singh PA-C

## 2022-06-16 ENCOUNTER — OFFICE VISIT (OUTPATIENT)
Dept: FAMILY MEDICINE CLINIC | Facility: CLINIC | Age: 87
End: 2022-06-16
Payer: MEDICARE

## 2022-06-16 VITALS
RESPIRATION RATE: 16 BRPM | OXYGEN SATURATION: 96 % | HEART RATE: 65 BPM | HEIGHT: 62 IN | DIASTOLIC BLOOD PRESSURE: 66 MMHG | BODY MASS INDEX: 27.35 KG/M2 | SYSTOLIC BLOOD PRESSURE: 138 MMHG | WEIGHT: 148.63 LBS

## 2022-06-16 DIAGNOSIS — I73.9 PERIPHERAL ARTERY DISEASE (HCC): Primary | ICD-10-CM

## 2022-06-16 PROCEDURE — 99213 OFFICE O/P EST LOW 20 MIN: CPT | Performed by: FAMILY MEDICINE

## 2022-06-16 PROCEDURE — 3008F BODY MASS INDEX DOCD: CPT | Performed by: FAMILY MEDICINE

## 2022-06-16 PROCEDURE — 3078F DIAST BP <80 MM HG: CPT | Performed by: FAMILY MEDICINE

## 2022-06-16 PROCEDURE — 3075F SYST BP GE 130 - 139MM HG: CPT | Performed by: FAMILY MEDICINE

## 2022-06-22 ENCOUNTER — TELEPHONE (OUTPATIENT)
Dept: FAMILY MEDICINE CLINIC | Facility: CLINIC | Age: 87
End: 2022-06-22

## 2022-07-13 ENCOUNTER — HOSPITAL ENCOUNTER (OUTPATIENT)
Dept: ULTRASOUND IMAGING | Facility: HOSPITAL | Age: 87
Discharge: HOME | End: 2022-07-13
Attending: FAMILY MEDICINE
Payer: MEDICARE

## 2022-07-13 DIAGNOSIS — I73.9 PERIPHERAL ARTERY DISEASE (HCC): ICD-10-CM

## 2022-07-13 PROCEDURE — 93925 LOWER EXTREMITY STUDY: CPT | Performed by: FAMILY MEDICINE

## 2022-08-04 DIAGNOSIS — F32.5 MAJOR DEPRESSIVE DISORDER IN REMISSION, UNSPECIFIED WHETHER RECURRENT (HCC): ICD-10-CM

## 2022-08-04 DIAGNOSIS — F41.9 ANXIETY: ICD-10-CM

## 2022-08-04 RX ORDER — ESCITALOPRAM OXALATE 10 MG/1
TABLET ORAL
Qty: 90 TABLET | Refills: 0 | Status: SHIPPED | OUTPATIENT
Start: 2022-08-04

## 2022-08-30 ENCOUNTER — HOSPITAL ENCOUNTER (OUTPATIENT)
Age: 87
Discharge: HOME | End: 2022-08-30
Attending: EMERGENCY MEDICINE
Payer: MEDICARE

## 2022-08-30 ENCOUNTER — APPOINTMENT (OUTPATIENT)
Dept: GENERAL RADIOLOGY | Age: 87
End: 2022-08-30
Attending: EMERGENCY MEDICINE
Payer: MEDICARE

## 2022-08-30 VITALS
WEIGHT: 144 LBS | HEART RATE: 73 BPM | BODY MASS INDEX: 26.5 KG/M2 | SYSTOLIC BLOOD PRESSURE: 150 MMHG | TEMPERATURE: 98 F | RESPIRATION RATE: 18 BRPM | OXYGEN SATURATION: 95 % | DIASTOLIC BLOOD PRESSURE: 64 MMHG | HEIGHT: 62 IN

## 2022-08-30 DIAGNOSIS — M79.671 RIGHT FOOT PAIN: Primary | ICD-10-CM

## 2022-08-30 PROCEDURE — 99213 OFFICE O/P EST LOW 20 MIN: CPT

## 2022-08-30 PROCEDURE — 73630 X-RAY EXAM OF FOOT: CPT | Performed by: EMERGENCY MEDICINE

## 2022-11-06 DIAGNOSIS — F41.9 ANXIETY: ICD-10-CM

## 2022-11-06 DIAGNOSIS — F32.5 MAJOR DEPRESSIVE DISORDER IN REMISSION, UNSPECIFIED WHETHER RECURRENT (HCC): ICD-10-CM

## 2022-11-07 RX ORDER — ESCITALOPRAM OXALATE 10 MG/1
TABLET ORAL
Qty: 90 TABLET | Refills: 0 | Status: SHIPPED | OUTPATIENT
Start: 2022-11-07

## 2023-01-19 RX ORDER — LEVOTHYROXINE SODIUM 0.05 MG/1
TABLET ORAL
Qty: 90 TABLET | Refills: 0 | Status: SHIPPED | OUTPATIENT
Start: 2023-01-19

## 2023-01-31 DIAGNOSIS — F41.9 ANXIETY: ICD-10-CM

## 2023-01-31 DIAGNOSIS — F32.5 MAJOR DEPRESSIVE DISORDER IN REMISSION, UNSPECIFIED WHETHER RECURRENT (HCC): ICD-10-CM

## 2023-02-01 RX ORDER — ESCITALOPRAM OXALATE 10 MG/1
TABLET ORAL
Qty: 90 TABLET | Refills: 0 | Status: SHIPPED | OUTPATIENT
Start: 2023-02-01

## 2023-02-02 ENCOUNTER — HOSPITAL ENCOUNTER (OUTPATIENT)
Age: 88
Discharge: HOME | End: 2023-02-02
Attending: EMERGENCY MEDICINE
Payer: COMMERCIAL

## 2023-02-02 ENCOUNTER — TELEPHONE (OUTPATIENT)
Dept: FAMILY MEDICINE CLINIC | Facility: CLINIC | Age: 88
End: 2023-02-02

## 2023-02-02 VITALS
BODY MASS INDEX: 26.5 KG/M2 | DIASTOLIC BLOOD PRESSURE: 63 MMHG | HEART RATE: 72 BPM | SYSTOLIC BLOOD PRESSURE: 155 MMHG | TEMPERATURE: 98 F | OXYGEN SATURATION: 97 % | RESPIRATION RATE: 18 BRPM | WEIGHT: 144 LBS | HEIGHT: 62 IN

## 2023-02-02 DIAGNOSIS — I10 HYPERTENSION, UNSPECIFIED TYPE: Primary | ICD-10-CM

## 2023-02-02 PROCEDURE — 99212 OFFICE O/P EST SF 10 MIN: CPT

## 2023-02-02 RX ORDER — AMPICILLIN 500 MG/1
500 CAPSULE ORAL 4 TIMES DAILY
COMMUNITY
End: 2023-02-04

## 2023-02-02 NOTE — ED INITIAL ASSESSMENT (HPI)
Patient presents to IC with c/o htn -/96. Had nausea yesterday without emesis. PCP sent to IC. Denies headache or numbness/tingling or visual changes.

## 2023-02-02 NOTE — TELEPHONE ENCOUNTER
pls call to discuss pt's elevated bp and low temp. Pt wanted appt today but no openings.   She does not want to \"sit at the ER for 4 hrs\"

## 2023-02-02 NOTE — TELEPHONE ENCOUNTER
Spoke with patient regarding symptoms. Patient states starting feeling nauseous night before with chills. BP this morning 168/68 per patient. Had patient retake bp while on the phone: 160/85. Patient denies any SOB or tightness of chest. Advised patient to go to IC for an evaluation of symptoms. Patient verbalized understanding. Per pt will have neighbor drive her to IC.

## 2023-02-03 ENCOUNTER — TELEPHONE (OUTPATIENT)
Dept: FAMILY MEDICINE CLINIC | Facility: CLINIC | Age: 88
End: 2023-02-03

## 2023-02-03 NOTE — TELEPHONE ENCOUNTER
Pt went to IC yesterday for hypertension. She said her elevated bp is due to an upcoming dental procedure that's \"making her very nervous\". She thinks her anxiety med needs to be increased.   Pls advise

## 2023-02-03 NOTE — TELEPHONE ENCOUNTER
Spoke with patient, she states she has an infected wisdom tooth- on ampicillin, the dentist is closed today but she was going to ask for more antibiotic. Went to  for evaluation of HTN, requesting anxiety medication for her elevated BP because she states this is what is causing the elevation, she denies any distress, denies pain, and accepted telephone visit tomorrow to discuss requests.

## 2023-02-20 ENCOUNTER — OFFICE VISIT (OUTPATIENT)
Dept: FAMILY MEDICINE CLINIC | Facility: CLINIC | Age: 88
End: 2023-02-20
Payer: MEDICARE

## 2023-02-20 VITALS
RESPIRATION RATE: 16 BRPM | HEIGHT: 62 IN | WEIGHT: 144 LBS | SYSTOLIC BLOOD PRESSURE: 142 MMHG | OXYGEN SATURATION: 97 % | HEART RATE: 69 BPM | DIASTOLIC BLOOD PRESSURE: 68 MMHG | BODY MASS INDEX: 26.5 KG/M2

## 2023-02-20 DIAGNOSIS — F41.9 ANXIETY: Chronic | ICD-10-CM

## 2023-02-20 DIAGNOSIS — E78.2 MIXED HYPERLIPIDEMIA: ICD-10-CM

## 2023-02-20 DIAGNOSIS — Z78.9 STATIN INTOLERANCE: ICD-10-CM

## 2023-02-20 DIAGNOSIS — I65.21 CAROTID ARTERY STENOSIS, ASYMPTOMATIC, RIGHT: ICD-10-CM

## 2023-02-20 DIAGNOSIS — E03.9 HYPOTHYROIDISM, UNSPECIFIED TYPE: ICD-10-CM

## 2023-02-20 DIAGNOSIS — I73.9 PERIPHERAL ARTERY DISEASE (HCC): ICD-10-CM

## 2023-02-20 DIAGNOSIS — R53.83 OTHER FATIGUE: ICD-10-CM

## 2023-02-20 DIAGNOSIS — I10 ESSENTIAL HYPERTENSION: ICD-10-CM

## 2023-02-20 DIAGNOSIS — I48.0 PAF (PAROXYSMAL ATRIAL FIBRILLATION) (HCC): ICD-10-CM

## 2023-02-20 DIAGNOSIS — Z13.6 SCREENING FOR CARDIOVASCULAR CONDITION: ICD-10-CM

## 2023-02-20 DIAGNOSIS — M85.80 OSTEOPENIA, UNSPECIFIED LOCATION: ICD-10-CM

## 2023-02-20 DIAGNOSIS — Z00.00 ENCOUNTER FOR ANNUAL HEALTH EXAMINATION: Primary | ICD-10-CM

## 2023-02-20 RX ORDER — TRIAMCINOLONE ACETONIDE 1 MG/G
CREAM TOPICAL
COMMUNITY
Start: 2022-08-24

## 2023-02-23 ENCOUNTER — LAB ENCOUNTER (OUTPATIENT)
Dept: LAB | Age: 88
End: 2023-02-23
Attending: FAMILY MEDICINE
Payer: COMMERCIAL

## 2023-02-23 DIAGNOSIS — Z13.6 SCREENING FOR CARDIOVASCULAR CONDITION: ICD-10-CM

## 2023-02-23 DIAGNOSIS — I10 ESSENTIAL HYPERTENSION: ICD-10-CM

## 2023-02-23 DIAGNOSIS — E78.2 MIXED HYPERLIPIDEMIA: ICD-10-CM

## 2023-02-23 DIAGNOSIS — Z78.9 STATIN INTOLERANCE: ICD-10-CM

## 2023-02-23 DIAGNOSIS — R53.83 OTHER FATIGUE: ICD-10-CM

## 2023-02-23 DIAGNOSIS — E03.9 HYPOTHYROIDISM, UNSPECIFIED TYPE: ICD-10-CM

## 2023-02-23 DIAGNOSIS — I48.0 PAF (PAROXYSMAL ATRIAL FIBRILLATION) (HCC): ICD-10-CM

## 2023-02-23 DIAGNOSIS — I73.9 PERIPHERAL ARTERY DISEASE (HCC): ICD-10-CM

## 2023-02-23 LAB
ALBUMIN SERPL-MCNC: 3.4 G/DL (ref 3.4–5)
ALBUMIN/GLOB SERPL: 1.1 {RATIO} (ref 1–2)
ALP LIVER SERPL-CCNC: 59 U/L
ALT SERPL-CCNC: 22 U/L
ANION GAP SERPL CALC-SCNC: 5 MMOL/L (ref 0–18)
AST SERPL-CCNC: 18 U/L (ref 15–37)
BASOPHILS # BLD AUTO: 0.04 X10(3) UL (ref 0–0.2)
BASOPHILS NFR BLD AUTO: 0.8 %
BILIRUB SERPL-MCNC: 0.4 MG/DL (ref 0.1–2)
BUN BLD-MCNC: 16 MG/DL (ref 7–18)
CALCIUM BLD-MCNC: 9.5 MG/DL (ref 8.5–10.1)
CHLORIDE SERPL-SCNC: 107 MMOL/L (ref 98–112)
CHOLEST SERPL-MCNC: 250 MG/DL (ref ?–200)
CO2 SERPL-SCNC: 26 MMOL/L (ref 21–32)
CREAT BLD-MCNC: 0.71 MG/DL
EOSINOPHIL # BLD AUTO: 0.19 X10(3) UL (ref 0–0.7)
EOSINOPHIL NFR BLD AUTO: 3.7 %
ERYTHROCYTE [DISTWIDTH] IN BLOOD BY AUTOMATED COUNT: 13 %
FASTING PATIENT LIPID ANSWER: YES
FASTING STATUS PATIENT QL REPORTED: YES
GFR SERPLBLD BASED ON 1.73 SQ M-ARVRAT: 82 ML/MIN/1.73M2 (ref 60–?)
GLOBULIN PLAS-MCNC: 3.1 G/DL (ref 2.8–4.4)
GLUCOSE BLD-MCNC: 106 MG/DL (ref 70–99)
HCT VFR BLD AUTO: 38.1 %
HDLC SERPL-MCNC: 59 MG/DL (ref 40–59)
HGB BLD-MCNC: 12.4 G/DL
IMM GRANULOCYTES # BLD AUTO: 0.02 X10(3) UL (ref 0–1)
IMM GRANULOCYTES NFR BLD: 0.4 %
LDLC SERPL CALC-MCNC: 163 MG/DL (ref ?–100)
LYMPHOCYTES # BLD AUTO: 1.54 X10(3) UL (ref 1–4)
LYMPHOCYTES NFR BLD AUTO: 30.3 %
MCH RBC QN AUTO: 28.2 PG (ref 26–34)
MCHC RBC AUTO-ENTMCNC: 32.5 G/DL (ref 31–37)
MCV RBC AUTO: 86.8 FL
MONOCYTES # BLD AUTO: 0.39 X10(3) UL (ref 0.1–1)
MONOCYTES NFR BLD AUTO: 7.7 %
NEUTROPHILS # BLD AUTO: 2.9 X10 (3) UL (ref 1.5–7.7)
NEUTROPHILS # BLD AUTO: 2.9 X10(3) UL (ref 1.5–7.7)
NEUTROPHILS NFR BLD AUTO: 57.1 %
NONHDLC SERPL-MCNC: 191 MG/DL (ref ?–130)
OSMOLALITY SERPL CALC.SUM OF ELEC: 288 MOSM/KG (ref 275–295)
PLATELET # BLD AUTO: 173 10(3)UL (ref 150–450)
POTASSIUM SERPL-SCNC: 4.5 MMOL/L (ref 3.5–5.1)
PROT SERPL-MCNC: 6.5 G/DL (ref 6.4–8.2)
RBC # BLD AUTO: 4.39 X10(6)UL
SODIUM SERPL-SCNC: 138 MMOL/L (ref 136–145)
TRIGL SERPL-MCNC: 155 MG/DL (ref 30–149)
TSI SER-ACNC: 1.91 MIU/ML (ref 0.36–3.74)
VLDLC SERPL CALC-MCNC: 30 MG/DL (ref 0–30)
WBC # BLD AUTO: 5.1 X10(3) UL (ref 4–11)

## 2023-02-23 PROCEDURE — 84443 ASSAY THYROID STIM HORMONE: CPT

## 2023-02-23 PROCEDURE — 80061 LIPID PANEL: CPT

## 2023-02-23 PROCEDURE — 36415 COLL VENOUS BLD VENIPUNCTURE: CPT

## 2023-02-23 PROCEDURE — 80053 COMPREHEN METABOLIC PANEL: CPT

## 2023-02-23 PROCEDURE — 85025 COMPLETE CBC W/AUTO DIFF WBC: CPT

## 2023-03-13 ENCOUNTER — APPOINTMENT (OUTPATIENT)
Dept: GENERAL RADIOLOGY | Age: 88
End: 2023-03-13
Attending: NURSE PRACTITIONER
Payer: MEDICARE

## 2023-03-13 ENCOUNTER — HOSPITAL ENCOUNTER (OUTPATIENT)
Age: 88
Discharge: HOME | End: 2023-03-13
Payer: MEDICARE

## 2023-03-13 ENCOUNTER — MOBILE ENCOUNTER (OUTPATIENT)
Dept: FAMILY MEDICINE CLINIC | Facility: CLINIC | Age: 88
End: 2023-03-13

## 2023-03-13 VITALS
BODY MASS INDEX: 26 KG/M2 | SYSTOLIC BLOOD PRESSURE: 188 MMHG | OXYGEN SATURATION: 97 % | RESPIRATION RATE: 18 BRPM | HEART RATE: 100 BPM | TEMPERATURE: 98 F | DIASTOLIC BLOOD PRESSURE: 84 MMHG | WEIGHT: 143 LBS

## 2023-03-13 DIAGNOSIS — M54.50 ACUTE MIDLINE LOW BACK PAIN, UNSPECIFIED WHETHER SCIATICA PRESENT: Primary | ICD-10-CM

## 2023-03-13 DIAGNOSIS — M54.32 SCIATICA OF LEFT SIDE: Primary | ICD-10-CM

## 2023-03-13 PROCEDURE — 72110 X-RAY EXAM L-2 SPINE 4/>VWS: CPT | Performed by: NURSE PRACTITIONER

## 2023-03-13 PROCEDURE — 99213 OFFICE O/P EST LOW 20 MIN: CPT

## 2023-03-13 PROCEDURE — 99214 OFFICE O/P EST MOD 30 MIN: CPT

## 2023-03-13 RX ORDER — TRAMADOL HYDROCHLORIDE 50 MG/1
50 TABLET ORAL EVERY 8 HOURS PRN
Qty: 10 TABLET | Refills: 0 | Status: SHIPPED | OUTPATIENT
Start: 2023-03-13 | End: 2023-03-18

## 2023-03-13 NOTE — DISCHARGE INSTRUCTIONS
Continue Tylenol. Take the tramadol only for severe pain. Use lidocaine, Salonpas patches or Biofreeze topically. I messaged your primary care doctor to put in a physical therapy order. Close follow-up with spine. Name of physician was provided. Call today for follow-up appointment.

## 2023-03-13 NOTE — ED INITIAL ASSESSMENT (HPI)
Patient presents to left lower back pain which radiates down her left buttock/leg. States she needs to twist to get into her bed. States laying down is the worst.States it woke her up around 4am Saturday morning. +Nausea

## 2023-03-17 ENCOUNTER — OFFICE VISIT (OUTPATIENT)
Dept: SURGERY | Facility: CLINIC | Age: 88
End: 2023-03-17
Payer: COMMERCIAL

## 2023-03-17 VITALS
WEIGHT: 142 LBS | BODY MASS INDEX: 26.13 KG/M2 | DIASTOLIC BLOOD PRESSURE: 68 MMHG | HEART RATE: 90 BPM | SYSTOLIC BLOOD PRESSURE: 120 MMHG | HEIGHT: 62 IN

## 2023-03-17 DIAGNOSIS — M51.36 DDD (DEGENERATIVE DISC DISEASE), LUMBAR: ICD-10-CM

## 2023-03-17 DIAGNOSIS — M54.16 LUMBAR RADICULOPATHY: Primary | ICD-10-CM

## 2023-03-17 RX ORDER — GABAPENTIN 100 MG/1
100 CAPSULE ORAL 2 TIMES DAILY
Qty: 90 CAPSULE | Refills: 1 | Status: SHIPPED | OUTPATIENT
Start: 2023-03-17

## 2023-03-17 RX ORDER — AMPICILLIN 500 MG/1
CAPSULE ORAL
COMMUNITY
Start: 2023-02-28

## 2023-03-18 ENCOUNTER — TELEPHONE (OUTPATIENT)
Dept: SURGERY | Facility: CLINIC | Age: 88
End: 2023-03-18

## 2023-03-18 NOTE — TELEPHONE ENCOUNTER
Patient called the answering service. I spoke to her and she reported that she took half a dose of gabapentin prescribed yesterday and has felt severe fatigue all day. I recommend that she stop taking the medicine at this time. Should she develop other symptoms, she may seek medical attention. Otherwise, I counseled her to contact the office on Monday for further instructions. She expressed understanding and agreement.

## 2023-03-20 ENCOUNTER — TELEPHONE (OUTPATIENT)
Dept: SURGERY | Facility: CLINIC | Age: 88
End: 2023-03-20

## 2023-03-20 ENCOUNTER — TELEPHONE (OUTPATIENT)
Dept: PHYSICAL THERAPY | Facility: HOSPITAL | Age: 88
End: 2023-03-20

## 2023-03-20 NOTE — TELEPHONE ENCOUNTER
Received a phone call from the [facility]. Call transferred to author     Pt states:  - After taking gabapentin she had a bad reaction to it.   - it did not leave her system for a while. - spoke to on call doctor over the weekend and to not take any more of the medications. No other medications were prescribed. - pt states she does not have an episode like this before. - patient states it is hard for her to get medications prescribed to her because she normally gets a bad reaction to it. - Pt nothing has changed in regards to her pain since 3/9/2023     - Pt states pain is disrupting sleep. Informed patient that her concerns will be routed to the provider for review and feedback.

## 2023-03-20 NOTE — TELEPHONE ENCOUNTER
Noted the providers feedback listed below. Noted patient is active on mychart     Sent patient a [employer].

## 2023-03-20 NOTE — TELEPHONE ENCOUNTER
Pt calling stating she took the gabapentin 100 MG Oral Cap,but emptied half of the medication. Pt states she took this on Saturday and was comotose. She couldn't move for at least an hour and a half. Pt states she felt deeply drugged. Pt requesting to speak to a nurse to see what she should do next.

## 2023-03-20 NOTE — TELEPHONE ENCOUNTER
Noted Dr. Rosa Maria Davalos note listed below. Noted the patient called the office on 3/20/2023 with same concern addressed in another TE. That TE was routed to the providers for review and additional feedbacks on what the patient should do next.      Nothing further needed for this encounter

## 2023-03-20 NOTE — TELEPHONE ENCOUNTER
Noted the providers recommendations listed below. Called patient to inform  Pt states:  - pt acknowledged providers recommendations. - pt is going to PT starting Wednesday on 3/22/2023.     - patient is wondering if she should cancel the 6 week appointment with her on 4/28/2023. Routed to the provider for additional feedback.

## 2023-03-20 NOTE — TELEPHONE ENCOUNTER
Given her extensive allergy list and her recent reaction to gabapentin, I do not recommend any further medication therapy other than over-the-counter. I can provide her a refill of tramadol if she would like, as she has been tolerating this well. She should contact our office when a refill is needed. Please advise, thank you.

## 2023-03-22 ENCOUNTER — OFFICE VISIT (OUTPATIENT)
Dept: PHYSICAL THERAPY | Age: 88
End: 2023-03-22
Attending: PHYSICIAN ASSISTANT
Payer: MEDICARE

## 2023-03-22 DIAGNOSIS — M51.36 DDD (DEGENERATIVE DISC DISEASE), LUMBAR: ICD-10-CM

## 2023-03-22 DIAGNOSIS — M54.16 LUMBAR RADICULOPATHY: ICD-10-CM

## 2023-03-22 PROCEDURE — 97110 THERAPEUTIC EXERCISES: CPT

## 2023-03-22 PROCEDURE — 97161 PT EVAL LOW COMPLEX 20 MIN: CPT

## 2023-03-22 NOTE — PATIENT INSTRUCTIONS
Thank you for coming to your physical therapy appt! During your appt today you were issued a HEP handout from [employer] which can also be accessed using the information below. The exercises chosen are meant to supplement the treatment you received and reinforce the progress made in physical therapy. It is important to stay consistent with your exercises to help facilitate and maximize your recovery! View at [email or web address] using code: [id number]

## 2023-03-22 NOTE — PROGRESS NOTES
PHYSICAL THERAPY INITIAL EVALUATION     Date of service: 3/22/2023  Dx: Acute midline low back pain, unspecified whether sciatica present (M54.50)   Insurance: [employer] P[employer]  Insurance Limits: authorization required  Visit #: 1  Authorized # of Visits: N/A  POC/Auth Expiration: N/A  Authorizing Physician/Provider: Macho     PATIENT SUMMARY     History/SARAH: \"Last night was so bad. I only slept an hour and a half. I have pain in my back and all the way down the leg. It's a sciatica problem. It started on the night of the 9th/10th at 4:00 in the morning. I have not been able to sleep since then. I can't take any of the pain medication or muscle relaxer because I'm blood thinners and the pain killers don't agree with me. The only thing I can take is the Tylenol. For the first while, it takes a little bit of the edge off. \"     \"I'm very sensitive to barometric changes. \"    The patient reports that she has a very tall box spring at home and she had a hard time getting into to bed. She used to try and twist to get into bed \"and I think I twisted one too many times. \" She reports that she had her daughter purchase a thinner box spring this past week. She reports that her leg has been giving out on her when she goes to stand up which is why she is using the walker. She also has railings to help her get out of bed in the morning. She purchased a grabber to help her with picking up objects because she has difficulty bending forward. DOI/S: 3/9/23    Aggravating Factors: \"lying down is the worst, getting up from a sitting position is horrible, getting up and sitting on the toilet is horrible. I'm just in pain most of the time. \"    Alleviating Factors: Tylenol    PMH: The patient's PMH was reviewed with the patient including allergies, medications, and surgical and medical history.  Patient  has a past medical history of Anxiety, Back injury (1980), Essential hypertension, Infection of tooth, Stroke [facility]), Thyroid disease, and TIA (transient ischemic attack). PLF/Personal Goals: \"I'm a very active person. I still drive and I like to shop. I walk around stores. 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 There are different events that I haven't been able to attend - concerts and \"other things. \" The patient would like to resume walking without AD. Occupation: retired    OBJECTIVE:     Pain/Symptom Presentation: Patient reports left-sided back and left leg pain    Pain at rest: 7-8/10  Pain at worst: 9/10    Outcomes Measure(s):   Modified Oswestry: 56% disability    Activity Measures:  Sleeping: Extreme Activity Limitation: Patient is only able to sleep a couple of hours a night. Sitting: Moderate Activity Limitation: Patient is able to sit up to 20 minutes before disruption due to symptoms. Bending Forward: Moderate Activity Limitation: Patient's stiffness subsides within an hour. Standing/Walking After Prolonged Sitting: Moderate Activity Limitation: Patient's stiffness subsides within an hour. Standing: Severe Activity Limitation: Patient is able to stand about 10-15 minutes. Walking: Severe Activity Limitation: Patient is able to walk about 10 minutes. Driving: Extreme Activity Limitation: Patient hasn't been driving since injury because \"I don't think I could step on the brake with the left foot. \"   Pushing: Severe Activity Limitation: Patient is limiting pushing/pulling\lifting activities to < 10lbs. Pulling: Severe Activity Limitation: Patient is limiting pushing/pulling\lifting activities to < 10lbs. Lifting Light Objects: Severe Activity Limitation: Patient is limiting pushing/pulling\lifting activities to < 10lbs. Inspection/Observation: Patient demonstrates structural thoracic kyphosis. With forward bending, the patient demonstrates minimal lumbar spine segmental flexion with increased thoracic spine flexion. ROM:   Hip Motion PROM AROM    Right Left Right Left   Hip Flexion N/A N/A N/A N/A   Hip Extension N/A N/A N/A N/A   Hip ABD N/A N/A N/A N/A   Hip ER (seated) 35 35 N/A N/A   Hip IR (seated) 15 15 N/A N/A   *indicates activity was associated with pain     Special Tests:   Low Back Special Tests: Forward spinal flexion: (+)  Seated karlee extension: (-)  Hip FADIR (hip scour): (R) (-), (L) (-)     ASSESSMENT:     Chetna Fox is a 80year old female that presents to physical therapy evaluation that presents with significant low back pain and functional deficits. The patient's pain started when rolling over in bed on 4/10/23. Since then, the patient is with significant low back pain that has forced her to use a walker, railings for bed transfers, and a grabber to pick objects up off the floor. The patient is also with complaints of a sharp pain in her left leg that causes her leg to give way and make her unsteady, mostly with sit to stand transfers. The patient is unable to lie down due to pain which does limit manual therapy and certain exercise selection. We may try recumbent positioning next session to try some additional passive stretching and/or manual therapy next session. Current functional limitation include but are not limited to sleeping at night, prolonged sitting, sit to stand transfers, prolonged standing, walking, and pulling/pushing/lifting for household chores. The patient would benefit from physical therapy to facilitate improved pain management and progression in mobility and strength for improved tolerance to ADL's.      Precautions/WB Status: WBAT  Education or Treatment Limitation(s): None  Rehab Potential: Good    TREATMENT:     Initial Evaluation: x 20min     Therapeutic Exercise: x 25min  Seated hip IR/ER: x 10   Seated lumbar flexion with SB: x 10   Seated hamstring stretch: attempted but d/c due to LBP  Seated ADD squeeze: attempted but d/c due to LBP  Seated ABD squeeze: attempted but d/c due to LBP  Seated scap retraction: 2 x 10 x 3\" (B)   Seated trunk rotation: 1 x 10 (B)   LAQ: 2 x 10 (B) - mild stretch noted   DLHR: 2 x 10   Standing marching: 2 x 10 (L) only   Patient Education: Patient was educated on anatomy and pathophysiology of current condition, rationale for physical therapy, anticipated treatment interventions, prognosis, timeline for recovery, and expected functional outcomes based on evaluative findings. Patient was educated on the importance of compliance with consistent treatment and HEP to achieve mutually established goals. Administered HEP: Issued and reviewed HEP handout, exercise selection, and recommended resistance. Provided verbal and written instructions/cueing for proper technique and common errors/compensations as needed. Home Exercise Program: Patient was issued a HEP handout 3/22/2023 including seated scap retraction, seated trunk rotation, LAQ, seated hip IR/ER, DLHR, and standing marching. Provider Interactions With Patient:   Patient education was provided as described above. All patient's questions were answered and the patient denied further comments, complaints, or concerns upon departure. Patient was issued an appt list and verbally confirmed the next appt date and time to ensure consistency with physical therapy attendance. Charges: PT Eval Low Complexity Complexity x 1, Therex x 2  Total Timed Treatment: 25min       Total Treatment Time: 45min     PLAN OF CARE:      Goals:  Short-Term Goals:  1. Patient will improve low back and hip mobility to allow for intermittent pain-free forward bending to reach for and  objects up from the floor. Timeframe: 3-4 weeks. Long-Term Goals:  1. Patient will improve low back and hip mobility to facilitate erect standing and walking after prolonged sitting or upon waking in the morning. Timeframe: 4-6 weeks.   2. Patient will improve low back mobility and postural strength and endurance to facilitate 2 hours of pain-free standing for household activities and community integration. Timeframe: 4-6 weeks. 3. Patient will improve low back pain and core endurance to facilitate walking distances of 1 mile daily to facilitate community ambulation. Timeframe: 6-8 weeks. 4. Patient will improve Modified Oswestry Low Back Pain Disability Questionnaire score by 10 points or 10% to indicate a true change in improved function for ADL's and restoring PLF. Timeframe: 6-8 weeks. Plan Frequency / Duration: Patient will be seen for 2x/week for 6 weeks, for a total of 12 visits, over a 90 day period. We will re-evaluate the patient at that time in order to determine functional progress, evaluate short-term goal completion, and establish an updated plan of care. Possible treatment interventions will/may include: Therapeutic Activities, Therapeutic Exercise, Neuromuscular Re-education, Manual Therapy, Home Exercise Program Instruction, Patient Education, Self-Care/Home Management, and Modalities as needed. Patient/Family was advised of these findings, precautions, and treatment options and has agreed to actively participate in planning and for this course of care. Thank you for your referral. Please co-sign or sign and return this letter via fax as soon as possible to [phone]. If you have any questions, please contact me at Dept: [phone]. Sincerely,    X___Shima Urban, PT, DPT, SCS, ATC, CSCS____ Date: _____3/22/2023________    Electronically signed by therapist: Tami Walker PT  [de-identified] certification required: Yes  I certify the need for these services furnished under this plan of treatment and while under my care.

## 2023-03-28 ENCOUNTER — OFFICE VISIT (OUTPATIENT)
Dept: PHYSICAL THERAPY | Age: 88
End: 2023-03-28
Attending: PHYSICIAN ASSISTANT
Payer: MEDICARE

## 2023-03-28 PROCEDURE — 97140 MANUAL THERAPY 1/> REGIONS: CPT

## 2023-03-28 PROCEDURE — 97110 THERAPEUTIC EXERCISES: CPT

## 2023-03-30 ENCOUNTER — OFFICE VISIT (OUTPATIENT)
Dept: PHYSICAL THERAPY | Age: 88
End: 2023-03-30
Attending: PHYSICIAN ASSISTANT
Payer: MEDICARE

## 2023-03-30 PROCEDURE — 97110 THERAPEUTIC EXERCISES: CPT

## 2023-03-30 PROCEDURE — 97112 NEUROMUSCULAR REEDUCATION: CPT

## 2023-03-30 NOTE — PATIENT INSTRUCTIONS
HEP update: Reviewed new HEP handout with new exercises and progressions, provided verbal and written instructions/cueing for proper technique and common errors/compensations as needed. Reviewed the importance of compliance with home exercise program to facilitate recovery and meet long-term goals. View at [email or web address] using code:  [email or web address]

## 2023-03-30 NOTE — PROGRESS NOTES
Date of Service: 3/30/2023  Dx: Acute midline low back pain, unspecified whether sciatica present (M54.50)   [facility]  Insurance Limits: Montse Griffin required  Visit #: 3  Authorized # of Visits: 10  POC/Auth Expiration: 6/22/23  Date of Last PN: 3/22/23 (Visit #1)  Authorizing Physician/Provider: Ashley Ernandez MD visit: N/A  Fall Risk: Mildly increased      Precautions: None            Subjective: The patient reports that after Tuesday, she went to [facility] and she \"way overdid it. \" She reports that she was pushing the cart but she was still doing it by herself. She reports that when she tried to do the lateral walking, it hurt on the right side. She reports that she's been too sore to do her exercises so the only one that she could do for the heel raises. I have a lot of arthritis and it's \"affecting my hands. \"     \"Hopefully I'll be able to drive pretty soon. I'm just not steady when I get out of the car. \"     The patient reports that she is sleeping \"a little bit better. For the first time I was able to lie on my side. I was tickled at first.\" The patient reports that she is only using the walker when she gets up in the middle of the night. Pain/Symptom Presentation:   Pain at rest: 5/10  Pain at worst: 8/10    Objective:     Treatment:  Therapeutic Activities: x 4min  Sit to stand: 1 x 5     Therapeutic Exercise: x 30min  Elastic band hip ABD: x 20 (B), green theraband (semi-reclined)  Hip ADD squeeze: x 20 (B) (semi-reclined)  DL bridging: 1 x 10 (semi-reclined)  LTR: x 10 (B) (semi-reclined)  Seated marching: x 20 (B)   DLHR: x 20 reps  Standing hip ABD, ext, hamstring curl: x 10 (R)   Seated lumbar flex with SB: x 10   Seated lumbar flex: attempted but d/c due to pain   LAQ: x 20 (B)   HEP update: Reviewed new HEP handout with new exercises and progressions, provided verbal and written instructions/cueing for proper technique and common errors/compensations as needed.  Reviewed the importance of compliance with home exercise program to facilitate recovery and meet long-term goals. Neuromuscular Re-education: x 6min  Narrow ALANNA balance, EO: x 30\"   Narrow ALANNA balance, EC: x 30\"   Tandem stance balance: x 20\" (B)     Provider Interactions With Patient:   Verbal and manual cueing on proper performance of the prescribed exercises. Assessment: Vania arrived today with reports of increased pain while walking at the grocery store after her last appt. She also reported irregular compliance with her HEP. Despite this, she was able to lie on her side at night while in bed and is decreasing the use of her walker. The patient is still with complaints of left posterior leg and back pain which causes some remaining instability at her left leg with sit to stand transfers. The patient was issued an updated HEP for additional LE strengthening. The patient would benefit from continued therapy for further progression in LE strengthening for full return to PLF. Goals:   Short-Term Goals:  1. Patient will improve low back and hip mobility to allow for intermittent pain-free forward bending to reach for and  objects up from the floor. Timeframe: 3-4 weeks. Long-Term Goals:  1. Patient will improve low back and hip mobility to facilitate erect standing and walking after prolonged sitting or upon waking in the morning. Timeframe: 4-6 weeks. 2. Patient will improve low back mobility and postural strength and endurance to facilitate 2 hours of pain-free standing for household activities and community integration. Timeframe: 4-6 weeks. 3. Patient will improve low back pain and core endurance to facilitate walking distances of 1 mile daily to facilitate community ambulation. Timeframe: 6-8 weeks. 4. Patient will improve Modified Oswestry Low Back Pain Disability Questionnaire score by 10 points or 10% to indicate a true change in improved function for ADL's and restoring PLF.  Timeframe: 6-8 weeks    Plan: Follow up on tolerance to updated HEP. HEP: Patient was issued a HEP including seated trunk flexion with UE support, LAQ, seated marching, and standing hip ABD, ext, and hamstring on right side only. Charges:  Therex x 2, NMR x 1         Total Timed Treatment: 40min    Total Treatment Time: 40min

## 2023-04-04 ENCOUNTER — OFFICE VISIT (OUTPATIENT)
Dept: PHYSICAL THERAPY | Age: 88
End: 2023-04-04
Attending: PHYSICIAN ASSISTANT
Payer: MEDICARE

## 2023-04-04 PROCEDURE — 97112 NEUROMUSCULAR REEDUCATION: CPT

## 2023-04-04 PROCEDURE — 97110 THERAPEUTIC EXERCISES: CPT

## 2023-04-04 PROCEDURE — 97140 MANUAL THERAPY 1/> REGIONS: CPT

## 2023-04-04 NOTE — PROGRESS NOTES
Date of Service: 4/4/2023  Dx: Acute midline low back pain, unspecified whether sciatica present (M54.50)   Insurance: [employer]NI [employer]UN[employer] [facility]  Insurance Limits: Chiquis Fuuty required  Visit #: 4  Authorized # of Visits: 10  POC/Auth Expiration: 6/22/23  Date of Last PN: 3/22/23 (Visit #1)  Authorizing Physician/Provider: Hoang Washburn   Next MD visit: N/A  Fall Risk: Mildly increased      Precautions: None            Subjective: \"I think it's about the same, maybe a smidge better. I drove to the bank and I didn't even get out. I'm sore when I get out of the car after driving. I've been sore all across my back and in the right hip. This hip was really bad the other day. I'm doing the exercises and they're easy. I'm walking down the [place] 6-7 times a day. Sometimes it's sore. My leg is not fully supporting me yet. I'm putting more weight on my right leg so I'm not steady because it's not doing it's share. \"      \"'I will be really happy when I can put socks on and take them off. \"     Pain/Symptom Presentation:   Pain at rest: 1-2/10  Pain at worst: 5-6/10    Objective:   Palpation: Patient was TTP over the left glutes and ITB/VL soft tissue. Treatment:    Therapeutic Exercise: x 30min  Elastic band hip ABD: x 20 (B), green theraband (semi-reclined)  Hip ADD squeeze: x 20 (B) (semi-reclined)  DL bridging: 1 x 10 (semi-reclined)  LTR: x 10 (B) (semi-reclined)  Seated marching: x 20 (B)   LAQ: x 20 (B), 2# ankle weight  Seated lumbar flex with SB: x 10   Seated hip IR: x 10 (B), stretch on left lateral hip  DLHR: x 20   Standing hip ABD, ext, hamstring curl: x 10 (R)     Neuromuscular Re-education: x 6min  Narrow ALANNA balance, EO: x 30\"   Narrow ALANNA balance, EC: x 30\"   Tandem stance balance: x 20\" (B)     Manual Therapy: x 10min  Soft tissue mobilization - right sidelying: (L) glute max, glute med, ITB/VL, low back soft tissue    Provider Interactions With Patient:   Verbal and manual cueing on proper performance of the prescribed exercises. Assessment: Gavin Henley is with remaining complaints of difficulty putting on her socks and shoes. She is able to cross her right leg over her knee but is unable to do it on the left side due to lateral hip and thigh pain. She continues to walk throughout the day, but is with remaining complaints of weakness on her left side. We added some soft tissue work for the left lateral hip and glute soft tissue, which was TTP for the patient. The patient was taken though some additional strengthening exercises. Goals:   Short-Term Goals:  1. Patient will improve low back and hip mobility to allow for intermittent pain-free forward bending to reach for and  objects up from the floor. Timeframe: 3-4 weeks. Long-Term Goals:  1. Patient will improve low back and hip mobility to facilitate erect standing and walking after prolonged sitting or upon waking in the morning. Timeframe: 4-6 weeks. 2. Patient will improve low back mobility and postural strength and endurance to facilitate 2 hours of pain-free standing for household activities and community integration. Timeframe: 4-6 weeks. 3. Patient will improve low back pain and core endurance to facilitate walking distances of 1 mile daily to facilitate community ambulation. Timeframe: 6-8 weeks. 4. Patient will improve Modified Oswestry Low Back Pain Disability Questionnaire score by 10 points or 10% to indicate a true change in improved function for ADL's and restoring PLF. Timeframe: 6-8 weeks    Plan: Continue to work on hip mobility and strength as able. HEP: Patient was issued a HEP including seated trunk flexion with UE support, LAQ, seated marching, and standing hip ABD, ext, and hamstring on right side only. Charges:  Therex x 1, NMR x 1, MT x 1       Total Timed Treatment: 46min    Total Treatment Time: 46min

## 2023-04-06 ENCOUNTER — OFFICE VISIT (OUTPATIENT)
Dept: PHYSICAL THERAPY | Age: 88
End: 2023-04-06
Attending: PHYSICIAN ASSISTANT
Payer: MEDICARE

## 2023-04-06 PROCEDURE — 97110 THERAPEUTIC EXERCISES: CPT

## 2023-04-06 PROCEDURE — 97140 MANUAL THERAPY 1/> REGIONS: CPT

## 2023-04-06 NOTE — PROGRESS NOTES
Date of Service: 4/6/2023  Dx: Acute midline low back pain, unspecified whether sciatica present (M54.50)   Insurance: SYDNI [facility]  Insurance Limits: Bari Roth required  Visit #: 5  Authorized # of Visits: 10  POC/Auth Expiration: 6/22/23  Date of Last PN: 3/22/23 (Visit #1)  Authorizing Physician/Provider: Peipto Ernandez MD visit: N/A  Fall Risk: Mildly increased      Precautions: None            Subjective: The patient reports that she is borrowing a cane from her daughter instead of using the walker. She admits that she is unfamiliar with walking with a cane. Pain/Symptom Presentation:   Pain at rest: 1-2/10  Pain at worst: 5-6/10    Objective:   Palpation: Patient was TTP over the left glutes and ITB/VL soft tissue. Treatment:    Therapeutic Exercise: x 30min  Gait training: sequencing for walking with SP cane   Hip PROM - IR/ER x 10 (B) (semi-reclined)  Manual hamstring stretch: x 30\" (B) (semi-reclined)  DKC with SB: x 10 (semi-reclined)  LTR with SB: x 10 (B) (semi-reclined)  DL bridging: 1 x 10 (semi-reclined)  Seated marching: x 20 (B)   LAQ: x 20 (B), 2# ankle weight  Seated lumbar flex with SB: x 10   Standing hip ABD, ext, hamstring curl, marching: x 10 (R)   Seated lumbar flexion stretch: x 20\"   Half-kneeling stretch with foot on chair: x 10   Seated hamstring stretch: x 20\" (B)     Manual Therapy: x 10min  Soft tissue mobilization - right sidelying: (L) glute max, glute med, ITB/VL, low back soft tissue    Provider Interactions With Patient:   Verbal and manual cueing on proper performance of the prescribed exercises. Assessment: Vania was instructed in proper sequencing for walking with SP cane. The patient was encouraged to try weaning from the walker as she is able, but to monitor her stability, balance, and endurance. The patient was taken through additional exercises to facilitate bending forward to put her socks and shoes on, which she tolerated much better than in her previous appts. The patient would benefit from continued therapy for further progression in hip and low back mobility and flexibility. Goals:   Short-Term Goals:  1. Patient will improve low back and hip mobility to allow for intermittent pain-free forward bending to reach for and  objects up from the floor. Timeframe: 3-4 weeks. Long-Term Goals:  1. Patient will improve low back and hip mobility to facilitate erect standing and walking after prolonged sitting or upon waking in the morning. Timeframe: 4-6 weeks. 2. Patient will improve low back mobility and postural strength and endurance to facilitate 2 hours of pain-free standing for household activities and community integration. Timeframe: 4-6 weeks. 3. Patient will improve low back pain and core endurance to facilitate walking distances of 1 mile daily to facilitate community ambulation. Timeframe: 6-8 weeks. 4. Patient will improve Modified Oswestry Low Back Pain Disability Questionnaire score by 10 points or 10% to indicate a true change in improved function for ADL's and restoring PLF. Timeframe: 6-8 weeks    Plan: Continue to work on hip mobility/flexibility and strength as able. HEP: Patient was issued a HEP including seated trunk flexion with UE support, LAQ, seated marching, and standing hip ABD, ext, and hamstring on right side only. Charges:  Therex x 2, MT x 1       Total Timed Treatment: 40min    Total Treatment Time: 40min

## 2023-04-07 ENCOUNTER — TELEPHONE (OUTPATIENT)
Dept: PHYSICAL THERAPY | Facility: HOSPITAL | Age: 88
End: 2023-04-07

## 2023-04-12 ENCOUNTER — OFFICE VISIT (OUTPATIENT)
Dept: PHYSICAL THERAPY | Age: 88
End: 2023-04-12
Attending: PHYSICIAN ASSISTANT
Payer: MEDICARE

## 2023-04-12 PROCEDURE — 97140 MANUAL THERAPY 1/> REGIONS: CPT

## 2023-04-12 PROCEDURE — 97110 THERAPEUTIC EXERCISES: CPT

## 2023-04-12 NOTE — PROGRESS NOTES
Date of Service: 4/12/2023  Dx: Acute midline low back pain, unspecified whether sciatica present (M54.50)   [facility]  Insurance Limits: Bari Roth required  Visit #: 6  Authorized # of Visits: 10  POC/Auth Expiration: 6/22/23  Date of Last PN: 3/22/23 (Visit #1)  Authorizing Physician/Provider: Pepito Ernandez MD visit: N/A  Fall Risk: Mildly increased      Precautions: None            Subjective: The patient asks about splitting her exercise up throughout the day. \"Things were feeling okay until I went to [facility] yesterday and had to shove that basket all over the place. \" The patient is preparing for a wisdom tooth removal and has been trying to make arrangements. 'I'm walking slowly, but I'm walking without the cane. \" The patient inquired about the requirements for a handicap sticker. Pain/Symptom Presentation:   Pain at rest: 1-2/10  Pain at worst: 5-6/10    Objective: Inspection/Observation: Patient entered the clinic without an AD. Palpation: Patient was TTP over the left glutes and ITB/VL soft tissue. Treatment:    Therapeutic Exercise: x 27min  Hip PROM - IR/ER x 10 (B)   Manual hamstring stretch: x 30\" (B)   DKC with SB: x 10   LTR with SB: x 10 (B)   DL bridging: 1 x 10   Seated marching: x 20 (B)   LAQ: x 20 (L), 2# ankle weight  Seated lumbar flex with SB: x 10 with YSB  Standing hip ABD, ext, hamstring curl, marching: x 10 (B)   Seated lumbar flexion stretch: x 20\"   Shuttle DLP: x 20, 4 cords     Neuromuscular Re-education: x 3min  Hooklying PPT: 1 x 20 x 3\"     Manual Therapy: x 10min  Soft tissue mobilization - right sidelying: (L) glute max, glute med, ITB/VL, low back soft tissue  Lateral hip traction: Grade 3, 4 x 10\" (B)     Provider Interactions With Patient:   Verbal and manual cueing on proper performance of the prescribed exercises. Assessment: Piedad Vivar continues to make small improvements. She is with improved mobility for reaching for the floor this session.  Because she was able to lie supine this date, we were able to add in some joint mobilizations for her hips as well. The patient still complains of difficulty putting on her socks and shoes which she usually does by crossing her foot over the top of the contralateral knee. The patient is only using her cane when walking in the parking lots now, but she is with some remaining complaints of weakness and instability. We continue to work on quad and LE strength and stability exercises in additional to hip and low back mobility to facilitate improved tolerance to ADL's. Goals:   Short-Term Goals:  1. Patient will improve low back and hip mobility to allow for intermittent pain-free forward bending to reach for and  objects up from the floor. Timeframe: 3-4 weeks. Long-Term Goals:  1. Patient will improve low back and hip mobility to facilitate erect standing and walking after prolonged sitting or upon waking in the morning. Timeframe: 4-6 weeks. 2. Patient will improve low back mobility and postural strength and endurance to facilitate 2 hours of pain-free standing for household activities and community integration. Timeframe: 4-6 weeks. 3. Patient will improve low back pain and core endurance to facilitate walking distances of 1 mile daily to facilitate community ambulation. Timeframe: 6-8 weeks. 4. Patient will improve Modified Oswestry Low Back Pain Disability Questionnaire score by 10 points or 10% to indicate a true change in improved function for ADL's and restoring PLF. Timeframe: 6-8 weeks    Plan: Continue to work on hip mobility/flexibility and strength as able. HEP: Patient was issued a HEP including seated trunk flexion with UE support, LAQ, seated marching, and standing hip ABD, ext, and hamstring on right side only. Charges:  Therex x 2, MT x 1       Total Timed Treatment: 40min    Total Treatment Time: 40min

## 2023-04-14 ENCOUNTER — APPOINTMENT (OUTPATIENT)
Dept: PHYSICAL THERAPY | Age: 88
End: 2023-04-14
Attending: PHYSICIAN ASSISTANT
Payer: MEDICARE

## 2023-04-19 ENCOUNTER — OFFICE VISIT (OUTPATIENT)
Dept: PHYSICAL THERAPY | Age: 88
End: 2023-04-19
Attending: PHYSICIAN ASSISTANT
Payer: MEDICARE

## 2023-04-19 PROCEDURE — 97110 THERAPEUTIC EXERCISES: CPT

## 2023-04-19 PROCEDURE — 97140 MANUAL THERAPY 1/> REGIONS: CPT

## 2023-04-19 NOTE — PROGRESS NOTES
Date of Service: 4/19/2023  Dx: Acute midline low back pain, unspecified whether sciatica present (M54.50)   Insurance: [email or web address] [facility]  Insurance Limits: auth required  Visit #: 7  Authorized # of Visits: 10  POC/Auth Expiration: 6/22/23  Date of Last PN: 3/22/23 (Visit #1)  Authorizing Physician/Provider: Harry Ernandez MD visit: N/A  Fall Risk: Mildly increased      Precautions: None            Subjective: \"I had that wisdom tooth pulled and it was very, very difficult. It's been infected for month and I've been on an atitbitoic for month. He has to get in there and he had to drill and cut and pull pieces off. I was awake for the whole thing. That was last Thursday and I couldn't exercise at all until all the bleeding stopped. I started doing all the leg things because I was so stiff from the fear and anxiety. My whole body was rigid. I was taking an anti-anxiety pill, which did nothing at all. I'm still sore and a little bit swollen. \"     Pain/Symptom Presentation:   Pain at rest: 1-2/10  Pain at worst: 5-6/10    Objective: Inspection/Observation: Patient entered the clinic without an AD. Palpation: Patient was TTP over the left glutes and ITB/VL soft tissue.      Treatment:    Therapeutic Exercise: x 27min  S/L clamshell: 2 x 10 (R), red theraband   Hip PROM - IR/ER x 10 (B)   Manual hamstring stretch: x 30\" (B)   DKC with SB: x 10   LTR with SB: x 10 (B)   DL bridging: 1 x 10   Seated marching: x 20 (B)   LAQ: x 20 (L), 2# ankle weight  Seated lumbar flex with SB: x 10 with YSB (HOLD)  Standing hip ABD, ext: 2 x 10 (B), 2# ankle weight  Seated lumbar flexion stretch: x 20\" (HOLD)  Shuttle DLP: x 20, 3 cords   Shuttle SLP: x 10 (B), 2 cords    Neuromuscular Re-education: x 3min  Hooklying PPT: 1 x 20 x 3\"     Manual Therapy: x 10min  Soft tissue mobilization - right sidelying: (L) glute max, glute med, ITB/VL, low back soft tissue    Provider Interactions With Patient:   Verbal and manual cueing on proper performance of the prescribed exercises. Patient was advised to schedule additional appts. Assessment: Dedra Dolan arrives today after some dental work that limited her activity level. The patient reports some increased low back and hip stiffness as a result. She was with remaining TTP over the left ITB/VL and glute soft tissue structures. We were able to progress some of her hip ABD and glute strengthening musculature this date. The patient did need to decrease the resistance for the leg press exercises this date. The patient would benefit from continued therapy for further progression in strength and stability. Goals:   Short-Term Goals:  1. Patient will improve low back and hip mobility to allow for intermittent pain-free forward bending to reach for and  objects up from the floor. Timeframe: 3-4 weeks. Long-Term Goals:  1. Patient will improve low back and hip mobility to facilitate erect standing and walking after prolonged sitting or upon waking in the morning. Timeframe: 4-6 weeks. 2. Patient will improve low back mobility and postural strength and endurance to facilitate 2 hours of pain-free standing for household activities and community integration. Timeframe: 4-6 weeks. 3. Patient will improve low back pain and core endurance to facilitate walking distances of 1 mile daily to facilitate community ambulation. Timeframe: 6-8 weeks. 4. Patient will improve Modified Oswestry Low Back Pain Disability Questionnaire score by 10 points or 10% to indicate a true change in improved function for ADL's and restoring PLF. Timeframe: 6-8 weeks    Plan: Continue to work on core, hip, and LE strength and stability as able. HEP: Patient was issued a HEP including seated trunk flexion with UE support, LAQ, seated marching, and standing hip ABD, ext, and hamstring on right side only. Charges:  Therex x 2, MT x 1       Total Timed Treatment: 40min    Total Treatment Time: 40min

## 2023-04-24 RX ORDER — LEVOTHYROXINE SODIUM 0.05 MG/1
TABLET ORAL
Qty: 90 TABLET | Refills: 0 | Status: SHIPPED | OUTPATIENT
Start: 2023-04-24

## 2023-05-01 ENCOUNTER — OFFICE VISIT (OUTPATIENT)
Dept: PHYSICAL THERAPY | Age: 88
End: 2023-05-01
Attending: FAMILY MEDICINE
Payer: MEDICARE

## 2023-05-01 PROCEDURE — 97110 THERAPEUTIC EXERCISES: CPT

## 2023-05-01 PROCEDURE — 97140 MANUAL THERAPY 1/> REGIONS: CPT

## 2023-05-01 NOTE — PROGRESS NOTES
Date of Service: 5/1/2023  Dx: Acute midline low back pain, unspecified whether sciatica present (M54.50)   Insurance: [facility]  Insurance Limits: auth required  Visit #: 8  Authorized # of Visits: 10  POC/Auth Expiration: 6/22/23  Date of Last PN: 3/22/23 (Visit #1)  Authorizing Physician/Provider: Sushant Ernandez MD visit: N/A  Fall Risk: Mildly increased      Precautions: None            Subjective: \"For the most part, I'm good but if I overdo it with walking or lifting something or pushing something. .. I tried lifting two packages of water in the cart and even just pushing the cart, I could feel it. \"     Pain/Symptom Presentation:   Pain at rest: 1-2/10  Pain at worst: 5-6/10    Objective:     Palpation: Patient was TTP over the left glutes and ITB/VL soft tissue. Treatment:    Therapeutic Exercise: x 26min  S/L clamshell: 2 x 10 (R), red theraband   Hip PROM - IR/ER x 10 (B)   Manual hamstring stretch: x 30\" (B)   LAQ: x 20 (L), 3# ankle weight  Standing hip ABD, ext: 2 x 10 (B), 3# ankle weight  Lateral step-up: 2 x 10 (B), 4\" step   Shuttle SLP: 2 x 10 (B), 2 cords  Sit to stand: 1 x 8, 1 x 5 to standard chair     Neuromuscular Re-education: x 4min  Fwd lise walk: 2 laps x 5 -6 \" hurdles, one hand assist  Lat lise walk: 2 laps x 5 -6 \" hurdles, one hand assist    Manual Therapy: x 10min  Soft tissue mobilization - right sidelying: (L) glute max, glute med, ITB/VL, low back soft tissue    Provider Interactions With Patient:   Verbal and manual cueing on proper performance of the prescribed exercises. Patient was advised to schedule additional appts. Assessment: Emi Chaudhry is with remaining complaints of strength deficits. She reports difficulty pushing a cart with 2 packs of water. The patient was educated that the two packs of water probably weight > 30lbs. The patient was educated that we haven't yet progressed her strengthening exercises to accommodate that kind of weight.  We progressed her exercise this date, focusing on additional glute and hip ABD strengthening. She was a bit fatigued by the end of the session but completed all exercises as instructed. Goals:   Short-Term Goals:  1. Patient will improve low back and hip mobility to allow for intermittent pain-free forward bending to reach for and  objects up from the floor. Timeframe: 3-4 weeks. Long-Term Goals:  1. Patient will improve low back and hip mobility to facilitate erect standing and walking after prolonged sitting or upon waking in the morning. Timeframe: 4-6 weeks. 2. Patient will improve low back mobility and postural strength and endurance to facilitate 2 hours of pain-free standing for household activities and community integration. Timeframe: 4-6 weeks. 3. Patient will improve low back pain and core endurance to facilitate walking distances of 1 mile daily to facilitate community ambulation. Timeframe: 6-8 weeks. 4. Patient will improve Modified Oswestry Low Back Pain Disability Questionnaire score by 10 points or 10% to indicate a true change in improved function for ADL's and restoring PLF. Timeframe: 6-8 weeks    Plan: Continue to work on core, hip, and LE strength and stability as able. HEP: Patient was issued a HEP including seated trunk flexion with UE support, LAQ, seated marching, and standing hip ABD, ext, and hamstring on right side only. Charges:  Therex x 2, MT x 1       Total Timed Treatment: 40min    Total Treatment Time: 40min

## 2023-05-03 DIAGNOSIS — F32.5 MAJOR DEPRESSIVE DISORDER IN REMISSION, UNSPECIFIED WHETHER RECURRENT (HCC): ICD-10-CM

## 2023-05-03 DIAGNOSIS — F41.9 ANXIETY: ICD-10-CM

## 2023-05-03 RX ORDER — ESCITALOPRAM OXALATE 10 MG/1
TABLET ORAL
Qty: 90 TABLET | Refills: 0 | Status: SHIPPED | OUTPATIENT
Start: 2023-05-03

## 2023-05-09 ENCOUNTER — OFFICE VISIT (OUTPATIENT)
Dept: PHYSICAL THERAPY | Age: 88
End: 2023-05-09
Attending: FAMILY MEDICINE
Payer: MEDICARE

## 2023-05-09 PROCEDURE — 97110 THERAPEUTIC EXERCISES: CPT

## 2023-05-09 PROCEDURE — 97140 MANUAL THERAPY 1/> REGIONS: CPT

## 2023-05-09 NOTE — PATIENT INSTRUCTIONS
Thank you for coming to your physical therapy appt! During your appt today you were issued a HEP handout from [employer] which can also be accessed using the information below. The exercises chosen are meant to supplement the treatment you received and reinforce the progress made in physical therapy. It is important to stay consistent with your exercises to help facilitate and maximize your recovery! View at www.[employer]-code. com using code: Bear Hargrove

## 2023-05-09 NOTE — PROGRESS NOTES
Date of Service: 5/9/2023  Dx: Acute midline low back pain, unspecified whether sciatica present (M54.50)   Insurance: [facility]  Insurance Limits: auth required  Visit #: 9  Authorized # of Visits: 10  POC/Auth Expiration: 6/22/23  Date of Last PN: 3/22/23 (Visit #1)  Authorizing Physician/Provider: Sushant Ernandez MD visit: N/A  Fall Risk: Mildly increased      Precautions: None            Subjective: \"For the most part, I'm good but if I overdo it with walking or lifting something or pushing something. .. I tried lifting two packages of water in the cart and even just pushing the cart, I could feel it. \"     Pain/Symptom Presentation:   Pain at rest: 1-2/10  Pain at worst: 5-6/10    Objective:     Palpation: Patient was TTP over the left glutes and ITB/VL soft tissue. Treatment:    Therapeutic Exercise: x 26min  S/L clamshell: 2 x 10 (R), red theraband   S/L hip ABD: 2 x 8 (B)   Hip PROM - IR/ER x 10 (B)   Manual hamstring stretch: x 30\" (B)   LAQ: x 20 (L), 3# ankle weight  Standing hip ABD, ext: 2 x 10 (B), 3# ankle weight  Lateral step-up: 1 x 10 (B), 4\" step - complained of knee pain   Shuttle DLP: 2 x 10, 3 cords   Shuttle SLP: 2 x 10 (B), 2 cords  Sit to stand: 1 x 8, 1 x 5 to standard chair     Neuromuscular Re-education: x 4min  Fwd lise walk: 2 laps x 5 -6 \" hurdles, step-over, one hand assist  Lat lise walk: 2 laps x 5 -6 \" hurdles, one hand assist    Manual Therapy: x 10min  Soft tissue mobilization - right sidelying: (L) glute max, glute med, ITB/VL, low back soft tissue    Provider Interactions With Patient:   Monitoring to ensure safe and effective exercise performance. Assessment: Vania progressed LE and hip ABD strengthening this date and was issued an update HEP handout. We will re-assess the patient next session. Goals:   Short-Term Goals:  1.  Patient will improve low back and hip mobility to allow for intermittent pain-free forward bending to reach for and  objects up from the floor. Timeframe: 3-4 weeks. Long-Term Goals:  1. Patient will improve low back and hip mobility to facilitate erect standing and walking after prolonged sitting or upon waking in the morning. Timeframe: 4-6 weeks. 2. Patient will improve low back mobility and postural strength and endurance to facilitate 2 hours of pain-free standing for household activities and community integration. Timeframe: 4-6 weeks. 3. Patient will improve low back pain and core endurance to facilitate walking distances of 1 mile daily to facilitate community ambulation. Timeframe: 6-8 weeks. 4. Patient will improve Modified Oswestry Low Back Pain Disability Questionnaire score by 10 points or 10% to indicate a true change in improved function for ADL's and restoring PLF. Timeframe: 6-8 weeks    Plan: Continue to work on core, hip, and LE strength and stability as able. Re-assess patient next session. HEP: Patient was issued a HEP including seated trunk flexion with UE support, LAQ, seated marching, and standing hip ABD, ext, and hamstring on right side only. Charges:  Therex x 2, MT x 1       Total Timed Treatment: 40min    Total Treatment Time: 40min

## 2023-05-11 ENCOUNTER — OFFICE VISIT (OUTPATIENT)
Dept: FAMILY MEDICINE CLINIC | Facility: CLINIC | Age: 88
End: 2023-05-11
Payer: MEDICARE

## 2023-05-11 VITALS
BODY MASS INDEX: 26.13 KG/M2 | RESPIRATION RATE: 16 BRPM | SYSTOLIC BLOOD PRESSURE: 160 MMHG | DIASTOLIC BLOOD PRESSURE: 64 MMHG | HEIGHT: 62 IN | WEIGHT: 142 LBS

## 2023-05-11 DIAGNOSIS — Z88.1 ALLERGY TO MULTIPLE ANTIBIOTICS: Primary | ICD-10-CM

## 2023-05-11 DIAGNOSIS — I73.9 CLAUDICATION OF BOTH LOWER EXTREMITIES (HCC): ICD-10-CM

## 2023-05-11 PROCEDURE — 3077F SYST BP >= 140 MM HG: CPT | Performed by: FAMILY MEDICINE

## 2023-05-11 PROCEDURE — 99214 OFFICE O/P EST MOD 30 MIN: CPT | Performed by: FAMILY MEDICINE

## 2023-05-11 PROCEDURE — 1160F RVW MEDS BY RX/DR IN RCRD: CPT | Performed by: FAMILY MEDICINE

## 2023-05-11 PROCEDURE — 3008F BODY MASS INDEX DOCD: CPT | Performed by: FAMILY MEDICINE

## 2023-05-11 PROCEDURE — 1159F MED LIST DOCD IN RCRD: CPT | Performed by: FAMILY MEDICINE

## 2023-05-11 PROCEDURE — 3078F DIAST BP <80 MM HG: CPT | Performed by: FAMILY MEDICINE

## 2023-05-16 ENCOUNTER — OFFICE VISIT (OUTPATIENT)
Dept: PHYSICAL THERAPY | Age: 88
End: 2023-05-16
Attending: FAMILY MEDICINE
Payer: MEDICARE

## 2023-05-16 PROCEDURE — 97110 THERAPEUTIC EXERCISES: CPT

## 2023-05-16 PROCEDURE — 97140 MANUAL THERAPY 1/> REGIONS: CPT

## 2023-05-16 NOTE — PATIENT INSTRUCTIONS
Thank you for coming to your physical therapy appt! During your appt today you were issued a HEP handout from [employer] which can also be accessed using the information below. The exercises chosen are meant to supplement the treatment you received and reinforce the progress made in physical therapy. It is important to stay consistent with your exercises to help facilitate and maximize your recovery! View at www.my[employer]-code. kajeet using code: 1995 [facility]

## 2023-05-17 ENCOUNTER — TELEPHONE (OUTPATIENT)
Dept: FAMILY MEDICINE CLINIC | Facility: CLINIC | Age: 88
End: 2023-05-17

## 2023-05-17 NOTE — TELEPHONE ENCOUNTER
[facility] Cast[facility]anos MD [facility] Fax [address] [id number]  Phone: [phone]  Fax: [phone]   Referral changed to the above provider.

## 2023-05-17 NOTE — TELEPHONE ENCOUNTER
[facility]Z referred her to a specialist Dr Lacy Alejandre. She said he only has office in [place] and that's too far for her to drive.   She needs someone else that is closer

## 2023-05-22 ENCOUNTER — HOSPITAL ENCOUNTER (OUTPATIENT)
Dept: ULTRASOUND IMAGING | Facility: HOSPITAL | Age: 88
Discharge: HOME | End: 2023-05-22
Attending: FAMILY MEDICINE
Payer: MEDICARE

## 2023-05-22 DIAGNOSIS — I73.9 CLAUDICATION OF BOTH LOWER EXTREMITIES (HCC): ICD-10-CM

## 2023-05-22 PROCEDURE — 93922 UPR/L XTREMITY ART 2 LEVELS: CPT | Performed by: FAMILY MEDICINE

## 2023-06-18 ENCOUNTER — HOSPITAL ENCOUNTER (EMERGENCY)
Facility: HOSPITAL | Age: 88
Discharge: HOME | End: 2023-06-18
Attending: EMERGENCY MEDICINE
Payer: MEDICARE

## 2023-06-18 ENCOUNTER — APPOINTMENT (OUTPATIENT)
Dept: MRI IMAGING | Facility: HOSPITAL | Age: 88
End: 2023-06-18
Attending: EMERGENCY MEDICINE
Payer: MEDICARE

## 2023-06-18 VITALS
RESPIRATION RATE: 16 BRPM | SYSTOLIC BLOOD PRESSURE: 149 MMHG | HEART RATE: 81 BPM | DIASTOLIC BLOOD PRESSURE: 69 MMHG | WEIGHT: 142 LBS | OXYGEN SATURATION: 98 % | HEIGHT: 62 IN | TEMPERATURE: 98 F | BODY MASS INDEX: 26.13 KG/M2

## 2023-06-18 DIAGNOSIS — G43.109 OCULAR MIGRAINE: Primary | ICD-10-CM

## 2023-06-18 LAB
ALBUMIN SERPL-MCNC: 3.1 G/DL (ref 3.4–5)
ALBUMIN/GLOB SERPL: 1 {RATIO} (ref 1–2)
ALP LIVER SERPL-CCNC: 60 U/L
ALT SERPL-CCNC: 24 U/L
ANION GAP SERPL CALC-SCNC: 6 MMOL/L (ref 0–18)
AST SERPL-CCNC: 52 U/L (ref 15–37)
ATRIAL RATE: 85 BPM
BASOPHILS # BLD AUTO: 0.03 X10(3) UL (ref 0–0.2)
BASOPHILS NFR BLD AUTO: 0.4 %
BILIRUB SERPL-MCNC: 0.4 MG/DL (ref 0.1–2)
BUN BLD-MCNC: 11 MG/DL (ref 7–18)
CALCIUM BLD-MCNC: 8.1 MG/DL (ref 8.5–10.1)
CHLORIDE SERPL-SCNC: 108 MMOL/L (ref 98–112)
CO2 SERPL-SCNC: 21 MMOL/L (ref 21–32)
CREAT BLD-MCNC: 0.58 MG/DL
EOSINOPHIL # BLD AUTO: 0.06 X10(3) UL (ref 0–0.7)
EOSINOPHIL NFR BLD AUTO: 0.8 %
ERYTHROCYTE [DISTWIDTH] IN BLOOD BY AUTOMATED COUNT: 12.8 %
GFR SERPLBLD BASED ON 1.73 SQ M-ARVRAT: 88 ML/MIN/1.73M2 (ref 60–?)
GLOBULIN PLAS-MCNC: 3.2 G/DL (ref 2.8–4.4)
GLUCOSE BLD-MCNC: 136 MG/DL (ref 70–99)
HCT VFR BLD AUTO: 39.1 %
HGB BLD-MCNC: 13.5 G/DL
IMM GRANULOCYTES # BLD AUTO: 0.02 X10(3) UL (ref 0–1)
IMM GRANULOCYTES NFR BLD: 0.3 %
LYMPHOCYTES # BLD AUTO: 1.27 X10(3) UL (ref 1–4)
LYMPHOCYTES NFR BLD AUTO: 17.6 %
MCH RBC QN AUTO: 28.1 PG (ref 26–34)
MCHC RBC AUTO-ENTMCNC: 34.5 G/DL (ref 31–37)
MCV RBC AUTO: 81.3 FL
MONOCYTES # BLD AUTO: 0.32 X10(3) UL (ref 0.1–1)
MONOCYTES NFR BLD AUTO: 4.4 %
NEUTROPHILS # BLD AUTO: 5.51 X10 (3) UL (ref 1.5–7.7)
NEUTROPHILS # BLD AUTO: 5.51 X10(3) UL (ref 1.5–7.7)
NEUTROPHILS NFR BLD AUTO: 76.5 %
OSMOLALITY SERPL CALC.SUM OF ELEC: 281 MOSM/KG (ref 275–295)
P AXIS: 54 DEGREES
P-R INTERVAL: 154 MS
PLATELET # BLD AUTO: 153 10(3)UL (ref 150–450)
POTASSIUM SERPL-SCNC: 4.5 MMOL/L (ref 3.5–5.1)
PROT SERPL-MCNC: 6.3 G/DL (ref 6.4–8.2)
Q-T INTERVAL: 376 MS
QRS DURATION: 82 MS
QTC CALCULATION (BEZET): 447 MS
R AXIS: 0 DEGREES
RBC # BLD AUTO: 4.81 X10(6)UL
SODIUM SERPL-SCNC: 135 MMOL/L (ref 136–145)
T AXIS: 32 DEGREES
VENTRICULAR RATE: 85 BPM
WBC # BLD AUTO: 7.2 X10(3) UL (ref 4–11)

## 2023-06-18 PROCEDURE — 80053 COMPREHEN METABOLIC PANEL: CPT | Performed by: EMERGENCY MEDICINE

## 2023-06-18 PROCEDURE — 96375 TX/PRO/DX INJ NEW DRUG ADDON: CPT

## 2023-06-18 PROCEDURE — 99285 EMERGENCY DEPT VISIT HI MDM: CPT

## 2023-06-18 PROCEDURE — 70551 MRI BRAIN STEM W/O DYE: CPT | Performed by: EMERGENCY MEDICINE

## 2023-06-18 PROCEDURE — 93005 ELECTROCARDIOGRAM TRACING: CPT

## 2023-06-18 PROCEDURE — 93010 ELECTROCARDIOGRAM REPORT: CPT

## 2023-06-18 PROCEDURE — 70544 MR ANGIOGRAPHY HEAD W/O DYE: CPT | Performed by: EMERGENCY MEDICINE

## 2023-06-18 PROCEDURE — 96374 THER/PROPH/DIAG INJ IV PUSH: CPT

## 2023-06-18 PROCEDURE — 85025 COMPLETE CBC W/AUTO DIFF WBC: CPT | Performed by: EMERGENCY MEDICINE

## 2023-06-18 PROCEDURE — 70547 MR ANGIOGRAPHY NECK W/O DYE: CPT | Performed by: EMERGENCY MEDICINE

## 2023-06-18 RX ORDER — ONDANSETRON 2 MG/ML
4 INJECTION INTRAMUSCULAR; INTRAVENOUS ONCE
Status: COMPLETED | OUTPATIENT
Start: 2023-06-18 | End: 2023-06-18

## 2023-06-18 RX ORDER — LORAZEPAM 2 MG/ML
0.5 INJECTION INTRAMUSCULAR ONCE
Status: COMPLETED | OUTPATIENT
Start: 2023-06-18 | End: 2023-06-18

## 2023-06-18 NOTE — ED INITIAL ASSESSMENT (HPI)
Pt arrives to ED with c/o of a headache, feeling shakey, and nausea. Pt states the headache started yesterday. Pt states that yesterday her left arm and hand went numb for about 10 minutes.

## 2023-06-18 NOTE — ED QUICK NOTES
Ativan not given, MRI called for an ETA, MRI is about 2 hours out, this RN will hold the Ativan until

## 2023-06-19 ENCOUNTER — PATIENT OUTREACH (OUTPATIENT)
Dept: CASE MANAGEMENT | Age: 88
End: 2023-06-19

## 2023-06-21 ENCOUNTER — TELEPHONE (OUTPATIENT)
Dept: FAMILY MEDICINE CLINIC | Facility: CLINIC | Age: 88
End: 2023-06-21

## 2023-06-21 RX ORDER — ONDANSETRON 4 MG/1
4 TABLET, ORALLY DISINTEGRATING ORAL EVERY 8 HOURS PRN
Qty: 20 TABLET | Refills: 0 | Status: SHIPPED | OUTPATIENT
Start: 2023-06-21

## 2023-06-21 NOTE — PROGRESS NOTES
3rd attempt ED f/up apt request  PCP -decline, pt states she has already been in contact w/ PCP office and will call back to schedule if the nausea meds dont work  Closing encounter

## 2023-06-21 NOTE — TELEPHONE ENCOUNTER
See previous message. I spoke with pt she went to ER 6/18/23 for migraine. Migraine resolved but nausea has lingered. Pt states she is eating bland foods but states she even wakes up nauseated. Pt denies any other SX only Nausea. Pt wondering if med can be prescribed? Please advise.   Last OV  5/11/23

## 2023-06-21 NOTE — TELEPHONE ENCOUNTER
Per Dr Susi Rivera:  Zofran 4 mg every 8 hours as needed for nausea #20     Pt informed and verbalized understanding.   Script to [place] per pt request.

## 2023-06-21 NOTE — TELEPHONE ENCOUNTER
Pt was in the ER on Sunday for a bad migraine/ she also felt nauseas.     Pt is still feeling nauseas   Please advise

## 2023-07-24 RX ORDER — LEVOTHYROXINE SODIUM 0.05 MG/1
50 TABLET ORAL
Qty: 90 TABLET | Refills: 0 | Status: SHIPPED | OUTPATIENT
Start: 2023-07-24

## 2023-08-03 DIAGNOSIS — F41.9 ANXIETY: ICD-10-CM

## 2023-08-03 DIAGNOSIS — F32.5 MAJOR DEPRESSIVE DISORDER IN REMISSION, UNSPECIFIED WHETHER RECURRENT (HCC): ICD-10-CM

## 2023-08-03 RX ORDER — ESCITALOPRAM OXALATE 10 MG/1
10 TABLET ORAL DAILY
Qty: 90 TABLET | Refills: 0 | Status: SHIPPED | OUTPATIENT
Start: 2023-08-03

## 2023-08-03 NOTE — TELEPHONE ENCOUNTER
A refill request was received for:  Requested Prescriptions     Pending Prescriptions Disp Refills    ESCITALOPRAM 10 MG Oral Tab [Pharmacy Med Name: Escitalopram Oxalate 10 Mg Tab Auro] 90 tablet 0     Sig: TAKE ONE TABLET BY MOUTH ONCE DAILY       Last refill date:5/3/2023      Last office visit:5/11/2023     Follow up due:  Future Appointments   Date Time Provider Don Betancur   8/22/2023  1:30 PM CH[address]

## 2023-08-22 ENCOUNTER — HOSPITAL ENCOUNTER (OUTPATIENT)
Dept: ULTRASOUND IMAGING | Age: 88
Discharge: HOME | End: 2023-08-22
Attending: FAMILY MEDICINE
Payer: MEDICARE

## 2023-08-22 DIAGNOSIS — I65.21 CAROTID ARTERY STENOSIS, ASYMPTOMATIC, RIGHT: ICD-10-CM

## 2023-08-22 PROCEDURE — 93880 EXTRACRANIAL BILAT STUDY: CPT | Performed by: FAMILY MEDICINE

## 2023-10-06 ENCOUNTER — OFFICE VISIT (OUTPATIENT)
Dept: FAMILY MEDICINE CLINIC | Facility: CLINIC | Age: 88
End: 2023-10-06
Payer: MEDICARE

## 2023-10-06 VITALS
HEART RATE: 73 BPM | HEIGHT: 62 IN | SYSTOLIC BLOOD PRESSURE: 138 MMHG | DIASTOLIC BLOOD PRESSURE: 60 MMHG | BODY MASS INDEX: 26.87 KG/M2 | OXYGEN SATURATION: 98 % | WEIGHT: 146 LBS | RESPIRATION RATE: 15 BRPM

## 2023-10-06 DIAGNOSIS — I10 ESSENTIAL HYPERTENSION: Primary | ICD-10-CM

## 2023-10-06 DIAGNOSIS — L82.1 SEBORRHEIC KERATOSES: ICD-10-CM

## 2023-10-06 DIAGNOSIS — R53.83 OTHER FATIGUE: ICD-10-CM

## 2023-10-06 DIAGNOSIS — Z23 FLU VACCINE NEED: ICD-10-CM

## 2023-10-06 PROCEDURE — 1160F RVW MEDS BY RX/DR IN RCRD: CPT | Performed by: FAMILY MEDICINE

## 2023-10-06 PROCEDURE — 3075F SYST BP GE 130 - 139MM HG: CPT | Performed by: FAMILY MEDICINE

## 2023-10-06 PROCEDURE — 1159F MED LIST DOCD IN RCRD: CPT | Performed by: FAMILY MEDICINE

## 2023-10-06 PROCEDURE — 90662 IIV NO PRSV INCREASED AG IM: CPT | Performed by: FAMILY MEDICINE

## 2023-10-06 PROCEDURE — 3008F BODY MASS INDEX DOCD: CPT | Performed by: FAMILY MEDICINE

## 2023-10-06 PROCEDURE — 3078F DIAST BP <80 MM HG: CPT | Performed by: FAMILY MEDICINE

## 2023-10-06 PROCEDURE — G0008 ADMIN INFLUENZA VIRUS VAC: HCPCS | Performed by: FAMILY MEDICINE

## 2023-10-06 PROCEDURE — 99214 OFFICE O/P EST MOD 30 MIN: CPT | Performed by: FAMILY MEDICINE

## 2023-10-20 RX ORDER — LEVOTHYROXINE SODIUM 0.05 MG/1
50 TABLET ORAL
Qty: 90 TABLET | Refills: 0 | Status: SHIPPED | OUTPATIENT
Start: 2023-10-20

## 2023-10-29 DIAGNOSIS — F41.9 ANXIETY: ICD-10-CM

## 2023-10-29 DIAGNOSIS — F32.5 MAJOR DEPRESSIVE DISORDER IN REMISSION, UNSPECIFIED WHETHER RECURRENT (HCC): ICD-10-CM

## 2023-10-30 RX ORDER — ESCITALOPRAM OXALATE 10 MG/1
10 TABLET ORAL DAILY
Qty: 90 TABLET | Refills: 0 | Status: SHIPPED | OUTPATIENT
Start: 2023-10-30

## 2023-10-30 NOTE — TELEPHONE ENCOUNTER
A refill request was received for:  Requested Prescriptions     Pending Prescriptions Disp Refills    ESCITALOPRAM 10 MG Oral Tab [Pharmacy Med Name: Escitalopram Oxalate 10 Mg Tab Auro] 90 tablet 0     Sig: TAKE ONE TABLET BY MOUTH ONCE DAILY       Last refill date:8/3/2023      Last office visit: 10/6/2023    Follow up due:  No future appointments.

## 2024-01-17 RX ORDER — LEVOTHYROXINE SODIUM 0.05 MG/1
50 TABLET ORAL
Qty: 90 TABLET | Refills: 0 | Status: SHIPPED | OUTPATIENT
Start: 2024-01-17

## 2024-01-29 DIAGNOSIS — F41.9 ANXIETY: ICD-10-CM

## 2024-01-29 DIAGNOSIS — F32.5 MAJOR DEPRESSIVE DISORDER IN REMISSION, UNSPECIFIED WHETHER RECURRENT (HCC): ICD-10-CM

## 2024-01-29 RX ORDER — ESCITALOPRAM OXALATE 10 MG/1
10 TABLET ORAL DAILY
Qty: 90 TABLET | Refills: 0 | Status: SHIPPED | OUTPATIENT
Start: 2024-01-29

## 2024-01-29 NOTE — TELEPHONE ENCOUNTER
.A refill request was received for:  Requested Prescriptions     Pending Prescriptions Disp Refills    ESCITALOPRAM 10 MG Oral Tab [Pharmacy Med Name: Escitalopram Oxalate 10 Mg Tab Auro] 90 tablet 0     Sig: TAKE ONE TABLET BY MOUTH ONCE DAILY       Last refill date:   10/30/2023    Last office visit: 5/11/2023    Follow up due:  Future Appointments   Date Time Provider Department Center   2/5/2024  1:30 PM David Canchola MD [facility] [address]

## 2024-01-30 DIAGNOSIS — F41.9 ANXIETY: ICD-10-CM

## 2024-01-30 DIAGNOSIS — F32.5 MAJOR DEPRESSIVE DISORDER IN REMISSION, UNSPECIFIED WHETHER RECURRENT (HCC): ICD-10-CM

## 2024-01-30 RX ORDER — ESCITALOPRAM OXALATE 10 MG/1
10 TABLET ORAL DAILY
Qty: 90 TABLET | Refills: 0 | OUTPATIENT
Start: 2024-01-30

## 2024-02-05 ENCOUNTER — OFFICE VISIT (OUTPATIENT)
Dept: FAMILY MEDICINE CLINIC | Facility: CLINIC | Age: 89
End: 2024-02-05
Payer: MEDICARE

## 2024-02-05 ENCOUNTER — LAB ENCOUNTER (OUTPATIENT)
Dept: LAB | Age: 89
End: 2024-02-05
Attending: FAMILY MEDICINE
Payer: MEDICARE

## 2024-02-05 VITALS
BODY MASS INDEX: 27.2 KG/M2 | HEART RATE: 67 BPM | DIASTOLIC BLOOD PRESSURE: 64 MMHG | WEIGHT: 147.81 LBS | SYSTOLIC BLOOD PRESSURE: 126 MMHG | RESPIRATION RATE: 15 BRPM | HEIGHT: 62 IN | OXYGEN SATURATION: 96 %

## 2024-02-05 DIAGNOSIS — E03.9 HYPOTHYROIDISM, UNSPECIFIED TYPE: ICD-10-CM

## 2024-02-05 DIAGNOSIS — I48.0 PAF (PAROXYSMAL ATRIAL FIBRILLATION) (HCC): ICD-10-CM

## 2024-02-05 DIAGNOSIS — E78.2 MIXED HYPERLIPIDEMIA: ICD-10-CM

## 2024-02-05 DIAGNOSIS — Z00.00 ENCOUNTER FOR ANNUAL HEALTH EXAMINATION: Primary | ICD-10-CM

## 2024-02-05 DIAGNOSIS — Z13.1 SCREENING FOR DIABETES MELLITUS (DM): ICD-10-CM

## 2024-02-05 DIAGNOSIS — I65.21 CAROTID ARTERY STENOSIS, ASYMPTOMATIC, RIGHT: ICD-10-CM

## 2024-02-05 DIAGNOSIS — R68.89 COLD INTOLERANCE: ICD-10-CM

## 2024-02-05 DIAGNOSIS — I10 ESSENTIAL HYPERTENSION: ICD-10-CM

## 2024-02-05 DIAGNOSIS — F41.9 ANXIETY: Chronic | ICD-10-CM

## 2024-02-05 DIAGNOSIS — M85.80 OSTEOPENIA, UNSPECIFIED LOCATION: ICD-10-CM

## 2024-02-05 DIAGNOSIS — Z78.9 STATIN INTOLERANCE: ICD-10-CM

## 2024-02-05 DIAGNOSIS — I73.9 CLAUDICATION OF BOTH LOWER EXTREMITIES (HCC): ICD-10-CM

## 2024-02-05 DIAGNOSIS — I73.9 PERIPHERAL ARTERY DISEASE (HCC): ICD-10-CM

## 2024-02-05 PROBLEM — G56.03 BILATERAL CARPAL TUNNEL SYNDROME: Status: RESOLVED | Noted: 2021-06-04 | Resolved: 2024-02-05

## 2024-02-05 LAB
ALBUMIN SERPL-MCNC: 3.5 G/DL (ref 3.4–5)
ALBUMIN/GLOB SERPL: 1 {RATIO} (ref 1–2)
ALP LIVER SERPL-CCNC: 74 U/L
ALT SERPL-CCNC: 22 U/L
ANION GAP SERPL CALC-SCNC: 3 MMOL/L (ref 0–18)
AST SERPL-CCNC: 19 U/L (ref 15–37)
BASOPHILS # BLD AUTO: 0.05 X10(3) UL (ref 0–0.2)
BASOPHILS NFR BLD AUTO: 0.9 %
BILIRUB SERPL-MCNC: 0.3 MG/DL (ref 0.1–2)
BUN BLD-MCNC: 13 MG/DL (ref 9–23)
CALCIUM BLD-MCNC: 9.4 MG/DL (ref 8.5–10.1)
CHLORIDE SERPL-SCNC: 109 MMOL/L (ref 98–112)
CO2 SERPL-SCNC: 27 MMOL/L (ref 21–32)
CREAT BLD-MCNC: 0.76 MG/DL
EGFRCR SERPLBLD CKD-EPI 2021: 75 ML/MIN/1.73M2 (ref 60–?)
EOSINOPHIL # BLD AUTO: 0.17 X10(3) UL (ref 0–0.7)
EOSINOPHIL NFR BLD AUTO: 3 %
ERYTHROCYTE [DISTWIDTH] IN BLOOD BY AUTOMATED COUNT: 12.6 %
EST. AVERAGE GLUCOSE BLD GHB EST-MCNC: 126 MG/DL (ref 68–126)
FASTING STATUS PATIENT QL REPORTED: NO
GLOBULIN PLAS-MCNC: 3.5 G/DL (ref 2.8–4.4)
GLUCOSE BLD-MCNC: 115 MG/DL (ref 70–99)
HBA1C MFR BLD: 6 % (ref ?–5.7)
HCT VFR BLD AUTO: 37.5 %
HGB BLD-MCNC: 12.8 G/DL
IMM GRANULOCYTES # BLD AUTO: 0.02 X10(3) UL (ref 0–1)
IMM GRANULOCYTES NFR BLD: 0.3 %
LYMPHOCYTES # BLD AUTO: 1.71 X10(3) UL (ref 1–4)
LYMPHOCYTES NFR BLD AUTO: 29.7 %
MCH RBC QN AUTO: 28.6 PG (ref 26–34)
MCHC RBC AUTO-ENTMCNC: 34.1 G/DL (ref 31–37)
MCV RBC AUTO: 83.9 FL
MONOCYTES # BLD AUTO: 0.38 X10(3) UL (ref 0.1–1)
MONOCYTES NFR BLD AUTO: 6.6 %
NEUTROPHILS # BLD AUTO: 3.42 X10 (3) UL (ref 1.5–7.7)
NEUTROPHILS # BLD AUTO: 3.42 X10(3) UL (ref 1.5–7.7)
NEUTROPHILS NFR BLD AUTO: 59.5 %
OSMOLALITY SERPL CALC.SUM OF ELEC: 289 MOSM/KG (ref 275–295)
PLATELET # BLD AUTO: 196 10(3)UL (ref 150–450)
POTASSIUM SERPL-SCNC: 4.1 MMOL/L (ref 3.5–5.1)
PROT SERPL-MCNC: 7 G/DL (ref 6.4–8.2)
RBC # BLD AUTO: 4.47 X10(6)UL
SODIUM SERPL-SCNC: 139 MMOL/L (ref 136–145)
TSI SER-ACNC: 0.83 MIU/ML (ref 0.36–3.74)
WBC # BLD AUTO: 5.8 X10(3) UL (ref 4–11)

## 2024-02-05 PROCEDURE — 85025 COMPLETE CBC W/AUTO DIFF WBC: CPT

## 2024-02-05 PROCEDURE — 36415 COLL VENOUS BLD VENIPUNCTURE: CPT

## 2024-02-05 PROCEDURE — 83036 HEMOGLOBIN GLYCOSYLATED A1C: CPT

## 2024-02-05 PROCEDURE — 84443 ASSAY THYROID STIM HORMONE: CPT

## 2024-02-05 PROCEDURE — 80053 COMPREHEN METABOLIC PANEL: CPT

## 2024-02-05 NOTE — PROGRESS NOTES
Subjective:   Bella Kirby is a 88 year old female who presents for a MA (Medicare Advantage) Supervisit (Once per calendar year) and scheduled follow up of multiple significant but stable problems.     Complains of feeling cold typically around 7 PM.  She tends to wear more close then her family and other people around her.  She asked about having moved [place] her life and now living here in the last 5-1/2 years if there is anything about having thicker versus thinner blood.  She is treated for hypothyroidism.  She has been slowly losing hair but not had any increased rate recently.  She denies heart palpitations.  Her skin is always been dry.  Nothing new here.    History/Other:   Fall Risk Assessment:   She has been screened for Falls and is low risk.      Cognitive Assessment:   She had a completely normal cognitive assessment - see flowsheet entries     Functional Ability/Status:   Bella Kirby has some abnormal functions as listed below:  She has Hearing problems based on screening of functional status.She has Vision problems based on screening of functional status.       Depression Screening (PHQ-2/PHQ-9): PHQ-2 SCORE: 1  , done 2/4/2024   Feeling down, depressed, or hopeless: 1              Advanced Directives:   She does have a Living Will but we do NOT have it on file in Epic.    She does have a POA but we do NOT have it on file in Epic.    Not discussed      Patient Active Problem List   Diagnosis    Anxiety    Essential hypertension    Osteopenia    Statin intolerance    Mixed hyperlipidemia    PAF (paroxysmal atrial fibrillation) (HCC)    Hypothyroidism    Calcific tendonitis of right upper arm    Peripheral artery disease (HCC)    Carotid artery stenosis, asymptomatic, right    Claudication of both lower extremities (HCC)     Allergies:  She is allergic to acyclovir, amoxicillin, atorvastatin, azithromycin, brinzolamide,  brompheniramine-phenylephrine, cefaclor, cephalexin, cinoxacin, clarithromycin, cyclobenzaprine, darvon [propoxyphene], diphenoxylate-atropine, doxycycline, epinephrine, ibuprofen, latex, loperamide, loracarbef, mayonaise, methylprednisolone, metronidazole, miconazole, nitrofurantoin, olive oil, paxil [paroxetine], pyridin [phenazopyridine], rosuvastatin, sulfamethoxazole, synalgos dc, donnatal [belladonna alk-phenobarbital], gantrisin [sulfisoxazole], smoke, sudafed [pseudoephedrine], trazodone, ampicillin, bactrim [sulfamethoxazole w/trimethoprim], dimetapp cold-allergy [brompheniramine-phenylephrine], effexor [venlafaxine], mycostatin [nystatin], and radiology contrast iodinated dyes.    Current Medications:  No outpatient medications have been marked as taking for the 2/5/24 encounter (Office Visit) with David Canchola MD.       Medical History:  She  has a past medical history of Anxiety, Back injury (1980), Essential hypertension, Infection of tooth, Stroke (HCC), Thyroid disease, and TIA (transient ischemic attack).  Surgical History:  She  has a past surgical history that includes hysterectomy and skin surgery (11/10/2021).   Family History:  Her family history includes Down's Syndrome in her sister; Heart Disorder in her father; Hyperlipidemia in her brother, brother, daughter, and daughter; Parkinson's disease in her brother; Stroke in her mother; cardiac shunt in her daughter; hypoglycemia (age of onset: 81) in her brother; hypotension (age of onset: 84) in her brother; malabsorption in her daughter; osteoarthritis in her daughter.  Social History:  She  reports that she has never smoked. She has never used smokeless tobacco. She reports that she does not drink alcohol and does not use drugs.    Tobacco:  She has never smoked tobacco.    CAGE Alcohol Screen:   CAGE screening score of 0 on 2/4/2024, showing low risk of alcohol abuse.      Patient Care Team:  David Canchola MD as PCP - General (Family  Medicine)  Doug Hoyt MD as Consulting Physician (NEUROLOGY)  Cristofer Elias, PT as Physical Therapist  Suzanne Guzman PT as Physical Therapist  Shima Terry, PT as Physical Therapist  Harleen Velázquez PT as Physical Therapist    Review of Systems     Negative except HPI.  Anxiety well-controlled.  Feels like her hearing is diminished on the left side.    Objective:   Physical Exam  /64   Pulse 67   Resp 15   Ht 5' 2\" (1.575 m)   Wt 147 lb 12.8 oz (67 kg)   SpO2 96%   BMI 27.03 kg/m²   General appearance: alert, appears stated age, and cooperative  Eyes: Pupils equal round reactive to light.  Extraocular muscles intact.  Anicteric sclera pink conjunctiva  Ears: normal TM's and external ear canals both ears  Throat: lips, mucosa, and tongue normal; teeth and gums normal  Lungs: clear to auscultation bilaterally  Heart: S1, S2 normal, no murmur, click, rub or gallop, regular rate and rhythm  Abdomen: soft, non-tender; bowel sounds normal; no masses,  no organomegaly  Pulses: 2+ and symmetric    /64   Pulse 67   Resp 15   Ht 5' 2\" (1.575 m)   Wt 147 lb 12.8 oz (67 kg)   SpO2 96%   BMI 27.03 kg/m²  Estimated body mass index is 27.03 kg/m² as calculated from the following:    Height as of this encounter: 5' 2\" (1.575 m).    Weight as of this encounter: 147 lb 12.8 oz (67 kg).    Medicare Hearing Assessment:   Tuning forks audible bilaterally x3      Visual Acuity:   Right Eye Visual Acuity: Corrected Right Eye Chart Acuity: 20/40   Left Eye Visual Acuity: Corrected Left Eye Chart Acuity: 20/40   Both Eyes Visual Acuity: Corrected Both Eyes Chart Acuity: 20/40   Able To Tolerate Visual Acuity: Yes        Assessment & Plan:   Bella Kirby is a 88 year old female who presents for a Medicare Assessment.     1. Encounter for annual health examination (Primary)  Prevnar 20.  Not interested.  2. Anxiety  Continue escitalopram.  When she stops that she feels worse  3. Carotid artery  stenosis, asymptomatic, right  Will be having an ultrasound in 2 days  4. Claudication of both lower extremities (HCC)  Overview:  feet and ankles  Will be having JOCELIN in 2 days    5. Essential hypertension  -     Comp Metabolic Panel (14); Future; Expected date: 02/05/2024  Well-controlled.  No change in medication  6. Hypothyroidism, unspecified type  -     TSH W Reflex To Free T4; Future; Expected date: 02/05/2024  May be causing her cold sensation.  Adjust medicine if elevated TSH is found  7. Mixed hyperlipidemia  -     Comp Metabolic Panel (14); Future; Expected date: 02/05/2024  She does not tolerate statins.  We have elected not to bother rechecking at this age.  8. Osteopenia, unspecified location  Exercise.  9. PAF (paroxysmal atrial fibrillation) (HCC)  On Xarelto.  On metoprolol and losartan.  Currently in sinus rhythm  10. Peripheral artery disease (HCC)  Having ultrasound this week.  11. Statin intolerance  Elected not to check lipids as we would not put her on medication  12. Cold intolerance  -     Comp Metabolic Panel (14); Future; Expected date: 02/05/2024  -     Hemoglobin A1C; Future; Expected date: 02/05/2024  -     TSH W Reflex To Free T4; Future; Expected date: 02/05/2024  -     CBC With Differential With Platelet; Future; Expected date: 02/05/2024  We also discussed there could be pathology in her hypothalamus since it seems to be in the evenings that she feels the coldest and her temperature actually drops into the 96 range.  She is not interested in following up with MRI of the hypothalamus.  13. Screening for diabetes mellitus (DM)  -     Hemoglobin A1C; Future; Expected date: 02/05/2024    The patient indicates understanding of these issues and agrees to the plan.  Lab work ordered.  Reinforced healthy diet, lifestyle, and exercise.      Return in 6 months (on 8/5/2024).     David Canchola MD, 2/5/2024     Supplementary Documentation:   General Health:  In the past six months, have you  lost more than 10 pounds without trying?: 2 - No  Has your appetite been poor?: No  Type of Diet: Balanced  How does the patient maintain a good energy level?: Other  How would you describe your daily physical activity?: Light  How would you describe your current health state?: Fair  How do you maintain positive mental well-being?: Social Interaction  On a scale of 0 to 10, with 0 being no pain and 10 being severe pain, what is your pain level?: 5 - (Moderate)  In the past six months, have you experienced urine leakage?: 0-No  At any time do you feel concerned for the safety/well-being of yourself and/or your children, in your home or elsewhere?: No  Have you had any immunizations at another office such as Influenza, Hepatitis B, Tetanus, or Pneumococcal?: No       Bella Kirby's SCREENING SCHEDULE   Tests on this list are recommended by your physician but may not be covered, or covered at this frequency, by your insurer.   Please check with your insurance carrier before scheduling to verify coverage.   PREVENTATIVE SERVICES FREQUENCY &  COVERAGE DETAILS LAST COMPLETION DATE   Diabetes Screening    Fasting Blood Sugar /  Glucose    One screening every 12 months if never tested or if previously tested but not diagnosed with pre-diabetes   One screening every 6 months if diagnosed with pre-diabetes Lab Results   Component Value Date     (H) 06/18/2023        Cardiovascular Disease Screening    Lipid Panel  Cholesterol  Lipoprotein (HDL)  Triglycerides Covered every 5 years for all Medicare beneficiaries without apparent signs or symptoms of cardiovascular disease Lab Results   Component Value Date    CHOLEST 250 (H) 02/23/2023    HDL 59 02/23/2023     (H) 02/23/2023    TRIG 155 (H) 02/23/2023         Electrocardiogram (EKG)   Covered if needed at Welcome to Medicare, and non-screening if indicated for medical reasons 06/18/2023      Ultrasound Screening for Abdominal Aortic Aneurysm (AAA) Covered once  in a lifetime for one of the following risk factors    Men who are 65-75 years old and have ever smoked    Anyone with a family history -     Colorectal Cancer Screening  Covered for ages 50-85; only need ONE of the following:    Colonoscopy   Covered every 10 years    Covered every 2 years if patient is at high risk or previous colonoscopy was abnormal -    No recommendations at this time    Flexible Sigmoidoscopy   Covered every 4 years -    Fecal Occult Blood Test Covered annually -   Bone Density Screening    Bone density screening    Covered every 2 years after age 65 if diagnosed with risk of osteoporosis or estrogen deficiency.    Covered yearly for long-term glucocorticoid medication use (Steroids) Last Dexa Scan:    XR DEXA BONE DENSITOMETRY (CPT=77080) 05/28/2021      No recommendations at this time   Pap and Pelvic    Pap   Covered every 2 years for women at normal risk; Annually if at high risk -  No recommendations at this time    Chlamydia Annually if high risk -  No recommendations at this time   Screening Mammogram    Mammogram     Recommend annually for all female patients aged 40 and older    One baseline mammogram covered for patients aged 35-39 -    No recommendations at this time    Immunizations    Influenza Covered once per flu season  Please get every year 10/06/2023  No recommendations at this time    Pneumococcal Each vaccine ([id number] & [id number]) covered once after 65 Prevnar 13: -    [id number]: -     Pneumococcal Vaccination(1 of 1 - PCV) Never done    Hepatitis B One screening covered for patients with certain risk factors   -  No recommendations at this time    Tetanus Toxoid Not covered by Medicare Part B unless medically necessary (cut with metal); may be covered with your pharmacy prescription benefits -    Tetanus, Diptheria and Pertusis TD and TDaP Not covered by Medicare Part B -  No recommendations at this time    Zoster Not covered by Medicare Part B; may be covered with  your pharmacy  prescription benefits -  No recommendations at this time     Annual Monitoring of Persistent Medications (ACE/ARB, digoxin diuretics, anticonvulsants)    Potassium Annually Lab Results   Component Value Date    K 4.5 06/18/2023         Creatinine   Annually Lab Results   Component Value Date    CREATSERUM 0.58 06/18/2023         BUN Annually Lab Results   Component Value Date    BUN 11 06/18/2023       Drug Serum Conc Annually No results found for: \"DIGOXIN\", \"DIG\", \"VALP\"

## 2024-02-05 NOTE — PATIENT INSTRUCTIONS
Bella Kirby's SCREENING SCHEDULE   Tests on this list are recommended by your physician but may not be covered, or covered at this frequency, by your insurer.   Please check with your insurance carrier before scheduling to verify coverage.   PREVENTATIVE SERVICES FREQUENCY &  COVERAGE DETAILS LAST COMPLETION DATE   Diabetes Screening    Fasting Blood Sugar /  Glucose    One screening every 12 months if never tested or if previously tested but not diagnosed with pre-diabetes   One screening every 6 months if diagnosed with pre-diabetes Lab Results   Component Value Date     (H) 06/18/2023        Cardiovascular Disease Screening    Lipid Panel  Cholesterol  Lipoprotein (HDL)  Triglycerides Covered every 5 years for all Medicare beneficiaries without apparent signs or symptoms of cardiovascular disease Lab Results   Component Value Date    CHOLEST 250 (H) 02/23/2023    HDL 59 02/23/2023     (H) 02/23/2023    TRIG 155 (H) 02/23/2023         Electrocardiogram (EKG)   Covered if needed at Welcome to Medicare, and non-screening if indicated for medical reasons 06/18/2023      Ultrasound Screening for Abdominal Aortic Aneurysm (AAA) Covered once in a lifetime for one of the following risk factors   • Men who are 65-75 years old and have ever smoked   • Anyone with a family history -     Colorectal Cancer Screening  Covered for ages 50-85; only need ONE of the following:    Colonoscopy   Covered every 10 years    Covered every 2 years if patient is at high risk or previous colonoscopy was abnormal -    No recommendations at this time    Flexible Sigmoidoscopy   Covered every 4 years -    Fecal Occult Blood Test Covered annually -   Bone Density Screening    Bone density screening    Covered every 2 years after age 65 if diagnosed with risk of osteoporosis or estrogen deficiency.    Covered yearly for long-term glucocorticoid medication use (Steroids) Last Dexa Scan:    XR DEXA BONE DENSITOMETRY (CPT=77080)  05/28/2021      No recommendations at this time   Pap and Pelvic    Pap   Covered every 2 years for women at normal risk; Annually if at high risk -  No recommendations at this time    Chlamydia Annually if high risk -  No recommendations at this time   Screening Mammogram    Mammogram     Recommend annually for all female patients aged 40 and older    One baseline mammogram covered for patients aged 35-39 -    No recommendations at this time    Immunizations    Influenza Covered once per flu season  Please get every year 10/06/2023  No recommendations at this time    Pneumococcal Each vaccine ([id number] & [id number]) covered once after 65 Prevnar 13: -    [id number]: -     Pneumococcal Vaccination(1 of 1 - PCV) Never done    Hepatitis B One screening covered for patients with certain risk factors   -  No recommendations at this time    Tetanus Toxoid Not covered by Medicare Part B unless medically necessary (cut with metal); may be covered with your pharmacy prescription benefits -    Tetanus, Diptheria and Pertusis TD and TDaP Not covered by Medicare Part B -  No recommendations at this time    Zoster Not covered by Medicare Part B; may be covered with your pharmacy  prescription benefits -  No recommendations at this time     Annual Monitoring of Persistent Medications (ACE/ARB, digoxin diuretics, anticonvulsants)    Potassium Annually Lab Results   Component Value Date    K 4.5 06/18/2023         Creatinine   Annually Lab Results   Component Value Date    CREATSERUM 0.58 06/18/2023         BUN Annually Lab Results   Component Value Date    BUN 11 06/18/2023       Drug Serum Conc Annually No results found for: \"DIGOXIN\", \"DIG\", \"VALP\"

## 2024-02-28 ENCOUNTER — OFFICE VISIT (OUTPATIENT)
Dept: FAMILY MEDICINE CLINIC | Facility: CLINIC | Age: 89
End: 2024-02-28
Payer: MEDICARE

## 2024-02-28 VITALS
BODY MASS INDEX: 26.68 KG/M2 | WEIGHT: 145 LBS | HEIGHT: 62 IN | DIASTOLIC BLOOD PRESSURE: 68 MMHG | RESPIRATION RATE: 18 BRPM | OXYGEN SATURATION: 98 % | HEART RATE: 74 BPM | SYSTOLIC BLOOD PRESSURE: 124 MMHG

## 2024-02-28 DIAGNOSIS — R22.42 LOCALIZED SWELLING OF TOE OF LEFT FOOT: Primary | ICD-10-CM

## 2024-02-28 PROCEDURE — 3078F DIAST BP <80 MM HG: CPT | Performed by: FAMILY MEDICINE

## 2024-02-28 PROCEDURE — 1159F MED LIST DOCD IN RCRD: CPT | Performed by: FAMILY MEDICINE

## 2024-02-28 PROCEDURE — 3008F BODY MASS INDEX DOCD: CPT | Performed by: FAMILY MEDICINE

## 2024-02-28 PROCEDURE — 3074F SYST BP LT 130 MM HG: CPT | Performed by: FAMILY MEDICINE

## 2024-02-28 PROCEDURE — 99213 OFFICE O/P EST LOW 20 MIN: CPT | Performed by: FAMILY MEDICINE

## 2024-02-28 NOTE — PROGRESS NOTES
[facility] Progress Note    SUBJECTIVE: Bella Kirby 88 year old female is here today for   Chief Complaint   Patient presents with    Ingrown Toenail     Left big toe       January 16th,     Struggles to reach her toes, has her toenaisl trimmed, and got kniced on the inside, and then a few days later had a little possible infection.    Soaking, and using hydrocortisone cream, silvadene.    She saw her dermatologist tried cotton    PMH  Past Medical History:   Diagnosis Date    Anxiety     Back injury 1980    Essential hypertension     Infection of tooth     Stroke (HCC)     Thyroid disease     TIA (transient ischemic attack)     6/23/19        PSH  Past Surgical History:   Procedure Laterality Date    HYSTERECTOMY      partial    SKIN SURGERY  11/10/2021    BCC left sandy of jaw mohs by Dr Valente Ang Hx:  No changes    ROS  See HPI    OBJECTIVE:  /68   Pulse 74   Resp 18   Ht 5' 2\" (1.575 m)   Wt 145 lb (65.8 kg)   SpO2 98%   BMI 26.52 kg/m²     Exam  Ext: some redness, blanhces on left great toe tip, not painful, no drainage, nail pushing into tip on medial side, but not ingrown      Labs:          Meds:   Current Outpatient Medications   Medication Sig Dispense Refill    ESCITALOPRAM 10 MG Oral Tab TAKE ONE TABLET BY MOUTH ONCE DAILY 90 tablet 0    LEVOTHYROXINE 50 MCG Oral Tab TAKE ONE TABLET BY MOUTH ONCE DAILY BEFORE BREAKFAST 90 tablet 0    ondansetron 4 MG Oral Tablet Dispersible Take 1 tablet (4 mg total) by mouth every 8 (eight) hours as needed for Nausea. 20 tablet 0    ampicillin 500 MG Oral Cap       triamcinolone 0.1 % External Cream       METOPROLOL SUCCINATE ER 25 MG Oral Tablet 24 Hr Take 1 tablet (25 mg total) by mouth nightly. 90 tablet 1    hydrocortisone 2.5 % External Cream Apply thin layer to affected areas twice daily x 2 weeks on and 2 weeks off, then restart 30 g 2    LOSARTAN POTASSIUM 25 MG Oral Tab TAKE ONE TABLET BY MOUTH ONCE DAILY (Patient taking  differently: PT states she takes this medication at night only 10:30/11) 90 tablet 3    Rivaroxaban (XARELTO) 20 MG Oral Tab Take 1 tablet (20 mg total) by mouth daily. (Patient taking differently: Take 1 tablet (20 mg total) by mouth daily. Takes nightly also) 90 tablet 3    melatonin 3 MG Oral Tab Take 0.5 tablets (1.5 mg total) by mouth nightly.      Probiotic Product (PROBIOTIC DAILY OR) Take by mouth daily.      Multiple Vitamin (MULTI-VITAMIN DAILY) Oral Tab Take by mouth daily.        Misc Natural Products (LUTEIN 20 OR) Take by mouth daily.        Ascorbic Acid (SUPER C COMPLEX OR) Take by mouth daily.             Assessment/Plan  Vania was seen today for ingrown toenail.    Diagnoses and all orders for this visit:    Localized swelling of toe of left foot         After discuss will continue with skin care, and watching, consider stronger steroid, let nail grow past and then trim beyond skin.         Total Time spent with patient and coordinating care:  25 minutes.    Follow up: as needed      Rafy Delacruz MD

## 2024-03-04 ENCOUNTER — TELEPHONE (OUTPATIENT)
Dept: FAMILY MEDICINE CLINIC | Facility: CLINIC | Age: 89
End: 2024-03-04

## 2024-03-04 ENCOUNTER — APPOINTMENT (OUTPATIENT)
Dept: CT IMAGING | Facility: HOSPITAL | Age: 89
End: 2024-03-04
Attending: EMERGENCY MEDICINE
Payer: MEDICARE

## 2024-03-04 ENCOUNTER — HOSPITAL ENCOUNTER (EMERGENCY)
Facility: HOSPITAL | Age: 89
Discharge: HOME | End: 2024-03-04
Attending: EMERGENCY MEDICINE
Payer: MEDICARE

## 2024-03-04 ENCOUNTER — APPOINTMENT (OUTPATIENT)
Dept: GENERAL RADIOLOGY | Facility: HOSPITAL | Age: 89
End: 2024-03-04
Attending: EMERGENCY MEDICINE
Payer: MEDICARE

## 2024-03-04 VITALS
SYSTOLIC BLOOD PRESSURE: 184 MMHG | OXYGEN SATURATION: 99 % | HEART RATE: 90 BPM | WEIGHT: 144 LBS | HEIGHT: 62 IN | RESPIRATION RATE: 23 BRPM | TEMPERATURE: 98 F | BODY MASS INDEX: 26.5 KG/M2 | DIASTOLIC BLOOD PRESSURE: 90 MMHG

## 2024-03-04 DIAGNOSIS — I74.9 TIA DUE TO EMBOLISM (HCC): ICD-10-CM

## 2024-03-04 DIAGNOSIS — G45.9 TIA DUE TO EMBOLISM (HCC): ICD-10-CM

## 2024-03-04 DIAGNOSIS — R47.9 SPEECH DISTURBANCE, UNSPECIFIED TYPE: ICD-10-CM

## 2024-03-04 DIAGNOSIS — M54.9 UPPER BACK PAIN: Primary | ICD-10-CM

## 2024-03-04 LAB
ALBUMIN SERPL-MCNC: 3.5 G/DL (ref 3.4–5)
ALBUMIN/GLOB SERPL: 1 {RATIO} (ref 1–2)
ALP LIVER SERPL-CCNC: 68 U/L
ALT SERPL-CCNC: 23 U/L
ANION GAP SERPL CALC-SCNC: 6 MMOL/L (ref 0–18)
AST SERPL-CCNC: 21 U/L (ref 15–37)
ATRIAL RATE: 84 BPM
BASOPHILS # BLD AUTO: 0.02 X10(3) UL (ref 0–0.2)
BASOPHILS NFR BLD AUTO: 0.4 %
BILIRUB SERPL-MCNC: 0.4 MG/DL (ref 0.1–2)
BUN BLD-MCNC: 12 MG/DL (ref 9–23)
CALCIUM BLD-MCNC: 9.6 MG/DL (ref 8.5–10.1)
CHLORIDE SERPL-SCNC: 109 MMOL/L (ref 98–112)
CO2 SERPL-SCNC: 23 MMOL/L (ref 21–32)
CREAT BLD-MCNC: 1.15 MG/DL
EGFRCR SERPLBLD CKD-EPI 2021: 46 ML/MIN/1.73M2 (ref 60–?)
EOSINOPHIL # BLD AUTO: 0.1 X10(3) UL (ref 0–0.7)
EOSINOPHIL NFR BLD AUTO: 1.9 %
ERYTHROCYTE [DISTWIDTH] IN BLOOD BY AUTOMATED COUNT: 12.6 %
GLOBULIN PLAS-MCNC: 3.6 G/DL (ref 2.8–4.4)
GLUCOSE BLD-MCNC: 153 MG/DL (ref 70–99)
GLUCOSE BLD-MCNC: 99 MG/DL (ref 70–99)
HCT VFR BLD AUTO: 37.7 %
HGB BLD-MCNC: 12.7 G/DL
IMM GRANULOCYTES # BLD AUTO: 0.01 X10(3) UL (ref 0–1)
IMM GRANULOCYTES NFR BLD: 0.2 %
LYMPHOCYTES # BLD AUTO: 1.19 X10(3) UL (ref 1–4)
LYMPHOCYTES NFR BLD AUTO: 22.7 %
MCH RBC QN AUTO: 28.2 PG (ref 26–34)
MCHC RBC AUTO-ENTMCNC: 33.7 G/DL (ref 31–37)
MCV RBC AUTO: 83.6 FL
MONOCYTES # BLD AUTO: 0.29 X10(3) UL (ref 0.1–1)
MONOCYTES NFR BLD AUTO: 5.5 %
NEUTROPHILS # BLD AUTO: 3.63 X10 (3) UL (ref 1.5–7.7)
NEUTROPHILS # BLD AUTO: 3.63 X10(3) UL (ref 1.5–7.7)
NEUTROPHILS NFR BLD AUTO: 69.3 %
OSMOLALITY SERPL CALC.SUM OF ELEC: 289 MOSM/KG (ref 275–295)
P AXIS: 79 DEGREES
P-R INTERVAL: 146 MS
PLATELET # BLD AUTO: 168 10(3)UL (ref 150–450)
POTASSIUM SERPL-SCNC: 4.1 MMOL/L (ref 3.5–5.1)
PROT SERPL-MCNC: 7.1 G/DL (ref 6.4–8.2)
Q-T INTERVAL: 372 MS
QRS DURATION: 80 MS
QTC CALCULATION (BEZET): 439 MS
R AXIS: 70 DEGREES
RBC # BLD AUTO: 4.51 X10(6)UL
SODIUM SERPL-SCNC: 138 MMOL/L (ref 136–145)
T AXIS: 71 DEGREES
TROPONIN I SERPL HS-MCNC: <3 NG/L
VENTRICULAR RATE: 84 BPM
WBC # BLD AUTO: 5.2 X10(3) UL (ref 4–11)

## 2024-03-04 PROCEDURE — 71250 CT THORAX DX C-: CPT | Performed by: EMERGENCY MEDICINE

## 2024-03-04 PROCEDURE — 82962 GLUCOSE BLOOD TEST: CPT

## 2024-03-04 PROCEDURE — 85025 COMPLETE CBC W/AUTO DIFF WBC: CPT | Performed by: EMERGENCY MEDICINE

## 2024-03-04 PROCEDURE — 36415 COLL VENOUS BLD VENIPUNCTURE: CPT

## 2024-03-04 PROCEDURE — 85025 COMPLETE CBC W/AUTO DIFF WBC: CPT

## 2024-03-04 PROCEDURE — 85610 PROTHROMBIN TIME: CPT | Performed by: EMERGENCY MEDICINE

## 2024-03-04 PROCEDURE — 70450 CT HEAD/BRAIN W/O DYE: CPT | Performed by: EMERGENCY MEDICINE

## 2024-03-04 PROCEDURE — 84484 ASSAY OF TROPONIN QUANT: CPT

## 2024-03-04 PROCEDURE — 80053 COMPREHEN METABOLIC PANEL: CPT | Performed by: EMERGENCY MEDICINE

## 2024-03-04 PROCEDURE — 80053 COMPREHEN METABOLIC PANEL: CPT

## 2024-03-04 PROCEDURE — 85730 THROMBOPLASTIN TIME PARTIAL: CPT | Performed by: EMERGENCY MEDICINE

## 2024-03-04 PROCEDURE — 99285 EMERGENCY DEPT VISIT HI MDM: CPT

## 2024-03-04 PROCEDURE — 84484 ASSAY OF TROPONIN QUANT: CPT | Performed by: EMERGENCY MEDICINE

## 2024-03-04 PROCEDURE — 71045 X-RAY EXAM CHEST 1 VIEW: CPT | Performed by: EMERGENCY MEDICINE

## 2024-03-04 PROCEDURE — 93005 ELECTROCARDIOGRAM TRACING: CPT

## 2024-03-04 PROCEDURE — 93010 ELECTROCARDIOGRAM REPORT: CPT

## 2024-03-04 RX ORDER — ASPIRIN 81 MG/1
81 TABLET, CHEWABLE ORAL ONCE
Status: COMPLETED | OUTPATIENT
Start: 2024-03-04 | End: 2024-03-04

## 2024-03-04 NOTE — ED PROVIDER NOTES
Patient Seen in: [facility] Emergency Department      History     Chief Complaint   Patient presents with    Difficulty Breathing    TIA     Stated Complaint: BAYRON since yesterday morning. PCP sent to r/o MI and or stroke    Subjective:   HPI    Patient is an 88-year-old female who states earlier in the week after some lifting she developed some upper back discomfort.  Patient states it seemed to be okay until yesterday.  Patient states yesterday she felt discomfort in her upper back which was severe.  Patient states it is constant worse with certain movements felt slightly short of breath.  Patient states around 1:30 PM she also had an episode where she went into the shower and normally says her prayer and her words were jumbled.  Patient states she was unable to get them out properly for about 30 minutes.  Patient states she did have a mild headache yesterday.  Patient states she tried to go to a walk-in clinic yesterday but they were closed.  Patient came in today for evaluation.  Patient states headache is resolved.  No further speech issues today.  Patient states she still has some mild upper back discomfort up to 4 out of 10.  Patient is on Xarelto but does not take aspirin.  Patient denies slurred speech today, no blurry vision double vision, no weakness numbness.  No trouble ambulating.  No chest pain, remainder of review of systems negative.    Objective:   Past Medical History:   Diagnosis Date    Anxiety     Back injury 1980    Essential hypertension     Infection of tooth     Stroke (HCC)     Thyroid disease     TIA (transient ischemic attack)     6/23/19              Past Surgical History:   Procedure Laterality Date    HYSTERECTOMY      partial    SKIN SURGERY  11/10/2021    BCC left sandy of jaw mohs by Dr Victor                Social History     Socioeconomic History    Marital status:    Tobacco Use    Smoking status: Never    Smokeless tobacco: Never   Vaping Use    Vaping Use: Never  used   Substance and Sexual Activity    Alcohol use: No    Drug use: No   Other Topics Concern    Caffeine Concern No     Comment: ice tea with caffeine occasionally; dark chocolate sometimes    Exercise Yes     Comment: little              Review of Systems    Positive for stated complaint: BAYRON since yesterday morning. PCP sent to r/o MI and or stroke  Other systems are as noted in HPI.  Constitutional and vital signs reviewed.      All other systems reviewed and negative except as noted above.    Physical Exam     ED Triage Vitals [03/04/24 1316]   BP (!) 162/81   Pulse 89   Resp 18   Temp 98.4 °F (36.9 °C)   Temp src    SpO2 95 %   O2 Device None (Room air)       Current:BP (!) 202/99   Pulse 84   Temp 98.4 °F (36.9 °C)   Resp 21   Ht 157.5 cm (5' 2\")   Wt 65.3 kg   SpO2 99%   BMI 26.34 kg/m²         Physical Exam  GENERAL: Patient resting comfortably on the cart in no acute distress.  HEENT: Extraocular muscles intact, pupils equal round reactive to light and accommodation.  Mouth normal, neck supple, no meningismus.  LUNGS: Lungs clear to auscultation bilaterally.  Mild discomfort tenderness upper back, no erythema crepitus or step-off.  CARDIOVASCULAR: + S1-S2, regular rate and rhythm, no murmurs.  BACK: No CVA tenderness, no midline bony tenderness.  ABDOMEN: + Bowel sounds, soft, nontender, nondistended.  No rebound, no guarding, no hepatosplenomegaly.  EXTREMITIES: Full range of motion, no tenderness, good capillary refill.  SKIN: No rash, good turgor.  NEURO: Patient answers questions appropriately.  No focal deficits appreciated.  No facial droop.  Tongue midline.  Sensation tact bilateral face.  Hearing intact.  Finger-to-nose intact bilaterally.  No pronator drift.  Sensation tact light touch upper lower extremities.  5-5 strength upper lower extremities.  Conversant.  Ambulatory.           ED Course     Labs Reviewed   COMP METABOLIC PANEL (14) - Abnormal; Notable for the following components:        Result Value    Glucose 153 (*)     Creatinine 1.15 (*)     eGFR-Cr 46 (*)     All other components within normal limits   TROPONIN I HIGH SENSITIVITY - Normal   POCT GLUCOSE - Normal   CBC WITH DIFFERENTIAL WITH PLATELET    Narrative:     The following orders were created for panel order CBC With Differential With Platelet.  Procedure                               Abnormality         Status                     ---------                               -----------         ------                     CBC W/ DIFFERENTIAL[[id number]]                              Final result                 Please view results for these tests on the individual orders.   RAINBOW DRAW LAVENDER   RAINBOW DRAW LIGHT GREEN   RAINBOW DRAW BLUE   CBC W/ DIFFERENTIAL     EKG    Rate, intervals and axes as noted on EKG Report.  Rate: 84  Rhythm: Sinus Rhythm  Reading: Normal sinus rhythm, no acute changes                 CT brain1. No acute intracranial findings   2. Cerebral atrophy with chronic microvascular ischemic changes.   Independent reviewed by myself, no subdural    CT chest1. No acute pulmonary findings.   2. Additional findings as detailed in the body of the report.       MRI/MRA head and neck, patient declined         MDM      Patient not a candidate for TNK as symptoms greater than 24 hours out and back to baseline.  Patient also not a candidate for neurointervention as symptoms greater than 24 hours.  Patient had a CT brain no acute abnormality.  I did speak with neurology who did recommend MRI MRA head and neck.  Patient declined doing these in the emergency department as she would prefer to be discharged and follow-up with neurology as an outpatient.  Patient is aware of the risks benefits including missed diagnosis possible death and competent to decline.  Son-in-law was present for this discussion.  Patient may return at any time for further evaluation.  Patient was given a baby aspirin.  Recommend call TIA clinic tomorrow.   Continue regular medicines including Xarelto.  Return if new or worse symptoms.  I did consider TIA, subarachnoid hemorrhage, pulled muscle, pneumothorax.                                   Medical Decision Making      Disposition and Plan     Clinical Impression:  1. Upper back pain    2. Speech disturbance, unspecified type    3. TIA due to embolism (HCC)         Disposition:  Discharge  3/4/2024  6:13 pm    Follow-up:  David Canchola MD  [address] [place] [address] [address]  [facility] [id number] [phone]    Follow up in 2 day(s)      [address]  [facility] [address] [phone]  Call  choose option 1 for general neurology and state that you are following up for TIA          Medications Prescribed:  Current Discharge Medication List

## 2024-03-04 NOTE — ED INITIAL ASSESSMENT (HPI)
Patient states yesterday she had difficulty finding her words & had garbled speech yesterday morning. Episode lasted 30 minutes. Resolved. Complains now of SOB & upper back pain.

## 2024-03-04 NOTE — TELEPHONE ENCOUNTER
Pt states yesterday she was short of breath, trouble coming up with words, today has pain between her shoulder blades.  Informed pt she needs to be evaluated in the ER immediately. Pt agreed and will go to ER.

## 2024-03-05 ENCOUNTER — TELEPHONE (OUTPATIENT)
Dept: NEUROLOGY | Facility: CLINIC | Age: 89
End: 2024-03-05

## 2024-03-05 NOTE — TELEPHONE ENCOUNTER
TIA CLINIC SCREENING    1. Date of ED visit/TIA diagnosis:  03/04/2024   Time of discharge from ED:  1823    2. Is patient currently admitted?  No   If YES - TIA Clinic Appointment not required.      3. Does patient already see an [facility] neurologist?  No  Name:     (if YES - TIA Clinic Appointment not required.  Route message on to patient's neurologist)    4. Patient's current anti-platelet therapy: Aspirin 81 mg    5. Patient's current statin therapy:  none    6. Has 2D Echo with bubble test been done?  No  Date:      7. Is TIA Clinic Appointment indicated?  Yes     If YES - route encounter to [facility] to schedule patient for appointment NO LATER THAN 48 HOURS AFTER ED DISCHARGE.     If UNSURE - route encounter to clinic provider for recommendation.     If NO - indicate reason and close encounter:  N/A

## 2024-03-05 NOTE — DISCHARGE INSTRUCTIONS
Follow-up for further evaluation with TIA clinic, call tomorrow morning for appointment.  Return if new or worse symptoms.  Continue Xarelto.  Tylenol as needed.  Recommend staying with family tonight.

## 2024-03-06 ENCOUNTER — OFFICE VISIT (OUTPATIENT)
Dept: NEUROLOGY | Facility: CLINIC | Age: 89
End: 2024-03-06
Payer: MEDICARE

## 2024-03-06 VITALS
RESPIRATION RATE: 16 BRPM | BODY MASS INDEX: 26 KG/M2 | OXYGEN SATURATION: 96 % | WEIGHT: 144 LBS | DIASTOLIC BLOOD PRESSURE: 60 MMHG | HEART RATE: 88 BPM | SYSTOLIC BLOOD PRESSURE: 122 MMHG

## 2024-03-06 DIAGNOSIS — R29.818 TRANSIENT NEUROLOGIC DEFICIT: ICD-10-CM

## 2024-03-06 DIAGNOSIS — R47.01 NON-FLUENT APHASIA: Primary | ICD-10-CM

## 2024-03-06 DIAGNOSIS — F40.240 CLAUSTROPHOBIA: ICD-10-CM

## 2024-03-06 PROCEDURE — 3078F DIAST BP <80 MM HG: CPT | Performed by: STUDENT IN AN ORGANIZED HEALTH CARE EDUCATION/TRAINING PROGRAM

## 2024-03-06 PROCEDURE — 3074F SYST BP LT 130 MM HG: CPT | Performed by: STUDENT IN AN ORGANIZED HEALTH CARE EDUCATION/TRAINING PROGRAM

## 2024-03-06 PROCEDURE — 1159F MED LIST DOCD IN RCRD: CPT | Performed by: STUDENT IN AN ORGANIZED HEALTH CARE EDUCATION/TRAINING PROGRAM

## 2024-03-06 PROCEDURE — 1160F RVW MEDS BY RX/DR IN RCRD: CPT | Performed by: STUDENT IN AN ORGANIZED HEALTH CARE EDUCATION/TRAINING PROGRAM

## 2024-03-06 PROCEDURE — 99204 OFFICE O/P NEW MOD 45 MIN: CPT | Performed by: STUDENT IN AN ORGANIZED HEALTH CARE EDUCATION/TRAINING PROGRAM

## 2024-03-06 RX ORDER — LORAZEPAM 0.5 MG/1
0.5 TABLET ORAL AS NEEDED
Qty: 1 TABLET | Refills: 0 | Status: SHIPPED | OUTPATIENT
Start: 2024-03-06

## 2024-03-06 NOTE — PROGRESS NOTES
Patient states symptoms started on Sunday, she went into the ED on 03/04/2024. Patient states she is no longer having TIA symptoms. Patient states mental fog yesterday.

## 2024-03-06 NOTE — H&P
Neurology New Office Visit    Bella Kirby   Date of Birth 8/21/1935    Subjective:  Bella Kirby is a(n) 88 year old female with a medical history of hypertension, history of possible TIA vs anxiety with functional symptoms (per Dr. Hoyt's note 2019), anxiety, dyslipidemia, hypothyroidism, statin intolerance, paroxysmal atrial fibrillation who presents for TIA clinic.       Per Dr. Hoyt's note 2019, \"The patient is following up for recurrent TIA like symptoms although some of these might be anxiety attack related functional symptoms. She was in ER multiple times due to elevated HTN at 190s. No evidence of new acute stroke.  She is on asa 81 mg and xarelto, she was unable to tolerate statin. Brain imaging showed some intracranial artery stenosis, she has no new focal weakness, numbness. She has some anxiety. Her BP was always high when she experienced such TIA like symptoms.\" Per chart, episode early 2019 with vision problems and words not making sense (word salad). Sgtarted on aspirin 325 mg daily.     Per patient, on Sunday, she went to take a shower. Always says a little prayer when starting to take a shower. Could not say words. She could not remember the words, only could remember one word. She thinks her comprehension was intact, but unsure as she was alone (she was able to go about shower routine as normal). She reports being very tired . No focal weaknes, no focal numbness/tingling, no new vision changes (has cataracts, no diplopia or blurry vision change with language issues), able to walk, coordination, bowel/bladder changes/incontinence, tongue biting/blood in mouth, seizure, confusion. After about 30-40 minutes the words came back. She is unsure if BP was elevated on Sunday. No warning sign. No changes in medications that preceded this, fever or illness, trauma/falls, vaccines. Did not miss dose on Saturday. BP in ED following day 162/81.     With regards to episode in 2019, recalls trying to talk to  neighbor, and words were gibberish at that time. Brought to hospital. The next the she recalls is the following afternoon in the hospital, big period she does not recall.     Reports recurrent episodes of symptoms when blood pressure elevated, headache. Can't aritculate well what the feeling is.     Per note has paroxysmal atrial fibrillation, started xarelo 2019. Per cardiology note 2/2024, ,remains on xarelto for paroxysmal atrial fibrillation, had elevated BP on exam that day. For dyspnea on exertion and leg edema, advise lexiscan stress test if worsening.     Does not snore at night. Reports getting pretty anxious at times, told me she felt bp may be going up during office visit SBP in 120s when measured at that symptomatic time.     Problem List:  Patient Active Problem List   Diagnosis    Anxiety    Essential hypertension    Osteopenia    Statin intolerance    Mixed hyperlipidemia    PAF (paroxysmal atrial fibrillation) (HCC)    Hypothyroidism    Calcific tendonitis of right upper arm    Peripheral artery disease (HCC)    Carotid artery stenosis, asymptomatic, right    Claudication of both lower extremities ([facility])       PMHx:  Past Medical History:   Diagnosis Date    Anxiety     Back injury 1980    Essential hypertension     Infection of tooth     Stroke (HCC)     Thyroid disease     TIA (transient ischemic attack)     6/23/19       PSHx:  Past Surgical History:   Procedure Laterality Date    HYSTERECTOMY      partial    SKIN SURGERY  11/10/2021    BCC left sandy of jaw mohs by Dr Victor       SocHx:  Social History     Socioeconomic History    Marital status:    Tobacco Use    Smoking status: Never    Smokeless tobacco: Never   Vaping Use    Vaping Use: Never used   Substance and Sexual Activity    Alcohol use: No    Drug use: No   Other Topics Concern    Caffeine Concern Yes     Comment: ice tea with caffeine occasionally; dark chocolate sometimes    Exercise Yes     Comment: little       Family  History:  Family History   Problem Relation Age of Onset    Stroke Mother     Heart Disorder Father     Other (Down's Syndrome) Sister     Other (hypotension) Brother 84        orthostatsis due to Parkinson's    Other (hypoglycemia) Brother 81    Other (Parkinson's disease) Brother     Hyperlipidemia Brother     Hyperlipidemia Brother     Other (malabsorption) Daughter     Hyperlipidemia Daughter     Other (cardiac shunt) Daughter         repaired age 17 surgically    Hyperlipidemia Daughter     Other (osteoarthritis) Daughter         hip       Current Medications:  Current Outpatient Medications   Medication Sig Dispense Refill    LORazepam 0.5 MG Oral Tab Take 1 tablet (0.5 mg total) by mouth as needed for Anxiety (Take 30 minutes before mri, do not drive after taking). 1 tablet 0    ESCITALOPRAM 10 MG Oral Tab TAKE ONE TABLET BY MOUTH ONCE DAILY 90 tablet 0    LEVOTHYROXINE 50 MCG Oral Tab TAKE ONE TABLET BY MOUTH ONCE DAILY BEFORE BREAKFAST 90 tablet 0    triamcinolone 0.1 % External Cream       METOPROLOL SUCCINATE ER 25 MG Oral Tablet 24 Hr Take 1 tablet (25 mg total) by mouth nightly. 90 tablet 1    hydrocortisone 2.5 % External Cream Apply thin layer to affected areas twice daily x 2 weeks on and 2 weeks off, then restart 30 g 2    LOSARTAN POTASSIUM 25 MG Oral Tab TAKE ONE TABLET BY MOUTH ONCE DAILY (Patient taking differently: PT states she takes this medication at night only 10:30/11) 90 tablet 3    Rivaroxaban (XARELTO) 20 MG Oral Tab Take 1 tablet (20 mg total) by mouth daily. (Patient taking differently: Take 1 tablet (20 mg total) by mouth daily. Takes nightly also) 90 tablet 3    Probiotic Product (PROBIOTIC DAILY OR) Take by mouth daily.      Multiple Vitamin (MULTI-VITAMIN DAILY) Oral Tab Take by mouth daily.        Misc Natural Products (LUTEIN 20 OR) Take by mouth daily.        Ascorbic Acid (SUPER C COMPLEX OR) Take by mouth daily.        ondansetron 4 MG Oral Tablet Dispersible Take 1 tablet (4  mg total) by mouth every 8 (eight) hours as needed for Nausea. (Patient not taking: Reported on 3/6/2024) 20 tablet 0    ampicillin 500 MG Oral Cap  (Patient not taking: Reported on 3/6/2024)      melatonin 3 MG Oral Tab Take 0.5 tablets (1.5 mg total) by mouth nightly. (Patient not taking: Reported on 3/6/2024)       Objective/Physical Exam:    Vital Signs:  Blood pressure 122/60, pulse 88, resp. rate 16, weight 144 lb (65.3 kg), SpO2 96%.  Reports getting pretty anxious at times, told me she felt bp may be going up during office visit SBP in 120s when measured at that symptomatic time.   General: Alert, good recall of personal medical history    Respiratory: Non labored breathing on room air    Cardiovascular: Regular rate    Mental status: Alert, oriented, language fluent, comprehension intact    Cranial nerves: Optic disc margins sharp VF full to confrontation bilaterally. Pupils equal, round, equally reactive to light and accommodation. Extraocular eye movements intact without nystagmus. Face sensation intact to light touch. Face strength symmetric and intact. No dysarthria.    Motor:   Power:   -Right upper extremity: shoulder abductors 5, elbow extensors 5, elbow flexors 5, wrist extensors 5, finger extension 5  -Left upper extremity: shoulder abductors 5, elbow extensors 5, elbow flexors 5, wrist extensors 5, finger extension 5  -Right lower extremity: hip flexion 5, knee extension 5, dorsiflexion 5  -Left lower extremity: hip flexion 5, knee extension 5, dorsiflexion 5  Tone: Normal   Bulk: Normal  Abnormal movements: None    Sensory: Intact to light touch, and vibration (>20 s thumbs, 18 s great toes) in distal extremities.    Coordination: Finger to nose and heel to shin intact.    Reflexes: Right/Left: Biceps 1/1, brachioradialis 1/1, hoffmans negative. Patella 1/1, achilles 1/1.    Gait: Narrow based, symmetric arm swing, no gait assist. Mild trouble tandem. Able to heel/toe.       Labs:     Component       Latest Ref Rng 2/5/2024 3/4/2024   WBC      4.0 - 11.0 x10(3) uL 5.8  5.2    RBC      3.80 - 5.30 x10(6)uL 4.47  4.51    Hemoglobin      12.0 - 16.0 g/dL 12.8  12.7    Hematocrit      35.0 - 48.0 % 37.5  37.7    Platelet Count      150.0 - 450.0 10(3)uL 196.0  168.0    MCV      80.0 - 100.0 fL 83.9  83.6    MCH      26.0 - 34.0 pg 28.6  28.2    MCHC      31.0 - 37.0 g/dL 34.1  33.7    RDW      % 12.6  12.6    Prelim Neutrophil Abs      1.50 - 7.70 x10 (3) uL 3.42  3.63    Neutrophils Absolute      1.50 - 7.70 x10(3) uL 3.42  3.63    Lymphocytes Absolute      1.00 - 4.00 x10(3) uL 1.71  1.19    Monocytes Absolute      0.10 - 1.00 x10(3) uL 0.38  0.29    Eosinophils Absolute      0.00 - 0.70 x10(3) uL 0.17  0.10    Basophils Absolute      0.00 - 0.20 x10(3) uL 0.05  0.02    Immature Granulocyte Absolute      0.00 - 1.00 x10(3) uL 0.02  0.01    Neutrophils %      % 59.5  69.3    Lymphocytes %      % 29.7  22.7    Monocytes %      % 6.6  5.5    Eosinophils %      % 3.0  1.9    Basophils %      % 0.9  0.4    Immature Granulocyte %      % 0.3  0.2    Glucose      70 - 99 mg/dL 115 (H)  153 (H)    Sodium      136 - 145 mmol/L 139  138    Potassium      3.5 - 5.1 mmol/L 4.1  4.1    Chloride      98 - 112 mmol/L 109  109    Carbon Dioxide, Total      21.0 - 32.0 mmol/L 27.0  23.0    ANION GAP      0 - 18 mmol/L 3  6    BUN      9 - 23 mg/dL 13  12    CREATININE      0.55 - 1.02 mg/dL 0.76  1.15 (H)    CALCIUM      8.5 - 10.1 mg/dL 9.4  9.6    CALCULATED OSMOLALITY      275 - 295 mOsm/kg 289  289    EGFR      >=60 mL/min/1.73m2 75  46 (L)    AST (SGOT)      15 - 37 U/L 19  21    ALT (SGPT)      13 - 56 U/L 22  23    ALKALINE PHOSPHATASE      55 - 142 U/L 74  68    Total Bilirubin      0.1 - 2.0 mg/dL 0.3  0.4    PROTEIN, TOTAL      6.4 - 8.2 g/dL 7.0  7.1    Albumin      3.4 - 5.0 g/dL 3.5  3.5    Globulin      2.8 - 4.4 g/dL 3.5  3.6    A/G Ratio      1.0 - 2.0  1.0  1.0    Patient Fasting for CMP? No     HEMOGLOBIN A1c       <5.7 % 6.0 (H)     ESTIMATED AVERAGE GLUCOSE      68 - 126 mg/dL 126     TSH      0.358 - 3.740 mIU/mL 0.831     Troponin I (High Sensitivity)      <=54 ng/L  <3    POC GLUCOSE      70 - 99 mg/dL  99       Legend:  (H) High  (L) Low  Component  Ref Range & Units 2/23/23  8:17 AM   Cholesterol, Total  <200 mg/dL 250 High    Comment: Desirable  <200 mg/dL  Borderline  200-239 mg/dL  High      >=240 mg/dL     HDL Cholesterol  40 - 59 mg/dL 59   Comment: Interpretive Information:  An HDL cholesterol <40 mg/dL is low and constitutes a coronary heart disease risk factor. An HDL cholesterol >60 mg/dL is a negative risk factor for coronary heart disease.     Triglycerides  30 - 149 mg/dL 155 High    Comment: Reference interval for fasting triglycerides  Desirable: <150 mg/dL  Borderline: 150-199 mg/dL  High: 200-499 mg/dL  Very High: >=500 mg/dL       LDL Cholesterol  <100 mg/dL 163 High    Comment: Desirable <100 mg/dL  Borderline 100-129 mg/dL  High     >=130mg/dL     VLDL  0 - 30 mg/dL 30   Non HDL Chol  <130 mg/dL 191 High    Comment: Desirable  <130 mg/dL  Borderline  130-159 mg/dL  High        160-189 mg/dL      Very high >=190 mg/dL     Patient Fasting for Lipid? Yes       CT CHEST (ENI=79736)    Result Date: 3/4/2024  CONCLUSION:  1. No acute pulmonary findings. 2. Additional findings as detailed in the body of the report.  LOCATION:  [facility]   Dictated by (CST): Curt Argueta MD on 3/04/2024 at 4:46 PM     Finalized by (CST): Curt Argueta MD on 3/04/2024 at 4:49 PM       CT BRAIN OR HEAD ([id number])    Result Date: 3/4/2024  CONCLUSION: 1. No acute intracranial findings 2. Cerebral atrophy with chronic microvascular ischemic changes.    LOCATION:  [id number]   Dictated by (CST): Jenny Cr MD on 3/04/2024 at 4:38 PM     Finalized by (CST): Jenny Cr MD on 3/04/2024 at 4:40 PM       XR CHEST AP PORTABLE  (CPT=71045)    Result Date: 3/4/2024  CONCLUSION:  No active cardiopulmonary process identified.    LOCATION:  [id number]      Dictated by (CST): Leonides Ruggiero MD on 3/04/2024 at 4:11 PM     Finalized by (CST): Leonides Ruggiero MD on 3/04/2024 at 4:12 PM        MRI brain MRA brain MRA neck 6/2023  FINDINGS:    MRI BRAIN  FINDINGS:  The ventricles and sulci are within normal limits.  FLAIR signal in the periventricular and subcortical white matter is compatible chronic small vessel disease.  Volume loss is noted.  There is no midline shift or mass effect.  The basal cisterns are  patent.  The gray-white matter differentiation is intact.  The craniocervical junction is unremarkable.       There is no acute intracranial hemorrhage or extra-axial fluid collection identified.  There is no parenchymal signal abnormality identified.  No significant abnormal parenchymal gradient susceptibility.  There is no abnormal parenchymal or  leptomeningeal enhancement.  There is no restricted diffusion to suggest acute ischemia/infarction.     The visualized paranasal sinuses and mastoid air cells are unremarkable.  Dental amalgam artifact limits evaluation of the left facial soft tissues.  The expected major intracranial flow voids are present.        MRA BRAIN  INTRACRANIAL CAROTIDS:         No visible stenosis or aneurysm.  ANTERIOR CEREBRALS:         No visible stenosis or aneurysm.  ANTERIOR COMMUNICATING:  No visible stenosis or aneurysm.  MIDDLE CEREBRALS:         No visible stenosis or aneurysm.  POSTERIOR COMMUNICATING: No visible stenosis or aneurysm.  SUPERIOR CEREBELLARS:         No visible stenosis or aneurysm.  BASILAR:             No visible stenosis or aneurysm.  INTRACRANIAL VERTEBRALS: No visible significant stenosis or dissection.     MRA NECK  COMMON CAROTID:                 Normal for age with no visible significant stenosis or dissection.  CERVICAL INTERNAL CAROTID:  Normal for age with no visible significant stenosis or dissection.  EXTERNAL CAROTID:               Normal for age with no visible significant  stenosis or dissection.  CERVICAL VERTEBRAL:               Normal for age with no visible significant stenosis or dissection.                   Impression   CONCLUSION:    1. No acute intracranial abnormality.  2. No acute abnormality on MR angiography of the head neck.     EEG 2019  IMPRESSION: Mildly abnormal study due to mild intermittent left temporal slowing; There were no electroclincal seizure or epileptiform discharges captured. Clinical correlation is advised.     Doug Hoyt MD ([place])   Neurology  [facility]  1/21/2019, 3:36 PM  Gerry Pandey,     Assessment:  Bella Kirby is a(n) 88 year old female with a medical history of hypertension, history of possible TIA vs anxiety with functional symptoms (per Dr. Hoyt's note 2019, vision problems, word salad, amnesia per patient), anxiety, dyslipidemia, hypothyroidism, statin intolerance, paroxysmal atrial fibrillation who presents for TIA clinic. Reports episode of speech arrest (could not say or think of words), while in shower lasting 30-40 minutes. Reports some associated sleepiness and some headache (per ED also discomfort in upper back). No clear impetus. In ED following day /81. Neurologic exam today unreveailng. CT head unreveealing (small vessel ischemic changes). MR brain 6/2023 with chronic small vessel ischemic disease, MRA head/neck unremarkable. ED labs with CHRISTY. A1C 6.0 (2/2024), ldl 163 (2023), TSH 0.831 (2/2024). EEG 2019 mild intermittent left temporal slowing.     Unclear whether symptoms represented encephalopathy or non-fluent aphasia. Differential includes hypertensive urgency versus transient ischemic attack (TIA) versus stroke versus lower suspicion for seizure (given still able to go about other routine and thinks comprehension intact). Further work up and risk reduction as below.     Plan:  -MRI brain, MRA head and neck, with and without contrast   -Lorazepam 0.5 mg prescribed to be taken 30 minutes  prior to MRI brain, do not drive after taking/get a ride home (tolerated in past per 6/2023 record review, IV)  -Go to the lab when able  -Schedule echocardiogram (TTE)  -Stroke risk reduction:    -Cholesterol: goal LDL < 70 mg/dL, will assess with bloodwork (intolerant of statins per patietn and record)   -Blood pressure: goal normotension, work with primary care doctor on this, continue measuring regularly and meet with primary care doctor to review   -Healthy diet (produce rich, DASH diet, Mediterranean diet)   -Regular exercise (Moderate to vigorous-intensity aerobic exercise for at least 30 minutes a day, 3 to 4 times a week)   -Antithrombotic: continue xarelto daily  -See cardiologist regarding blood pressures  -See primary care provider for reported difficulty in breathing for which you went to ER    1. Non-fluent aphasia  - MRI BRAIN MRA BRAIN MRA NECK (CPT=70551/08990/52059); Future  - CARD ECHO 2D W DOPPLER/BUBBLES (CPT=93306); Future  - Lipid Panel; Future  - LORazepam 0.5 MG Oral Tab; Take 1 tablet (0.5 mg total) by mouth as needed for Anxiety (Take 30 minutes before mri, do not drive after taking).  Dispense: 1 tablet; Refill: 0  - EEG (At [facility]); Future    2. Transient neurologic deficit  - MRI BRAIN MRA BRAIN MRA NECK (CPT=70551/69769/70401); Future  - CARD ECHO 2D W DOPPLER/BUBBLES (CPT=93306); Future  - Lipid Panel; Future  - LORazepam 0.5 MG Oral Tab; Take 1 tablet (0.5 mg total) by mouth as needed for Anxiety (Take 30 minutes before mri, do not drive after taking).  Dispense: 1 tablet; Refill: 0  - EEG (At [facility]); Future    3. Claustrophobia  - LORazepam 0.5 MG Oral Tab; Take 1 tablet (0.5 mg total) by mouth as needed for Anxiety (Take 30 minutes before mri, do not drive after taking).  Dispense: 1 tablet; Refill: 0    Total time 53 minutes including chart review, eliciting history, physical exam, and counseling.    Mackenzie Beltran, DO

## 2024-03-06 NOTE — PATIENT INSTRUCTIONS
Plan:  -MRI brain, MRA head and neck, with and without contrast   -Lorazepam 0.5 mg prescribed to be taken 30 minutes prior to MRI brain, do not drive after taking/get a ride home  -Go to the lab when able  -Schedule echocardiogram  -Stroke risk reduction:    -Cholesterol: goal LDL < 70 mg/dL, will assess with bloodwork   -Blood pressure: goal normotension, work with primary care doctor on this, continue measuring regularly and meet with primary care doctor to review   -Healthy diet (produce rich, DASH diet, Mediterranean diet)   -Regular exercise (Moderate to vigorous-intensity aerobic exercise for at least 30 minutes a day, 3 to 4 times a week)   -Antithrombotic: continue xarelto daily  -See cardiologist regarding blood pressures  -See primary care provider for reported difficulty in breathing for which you went to ER    Refill policies:    Allow 2-3 business days for refills; controlled substances may take longer.  Contact your pharmacy at least 5 days prior to running out of medication and have them send an electronic request or submit request through the “request refill” option in your [employer] account.  Refills are not addressed on weekends; covering physicians do not authorize routine medications on weekends.  No narcotics or controlled substances are refilled after noon on Fridays or by on call physicians.  By law, narcotics must be electronically prescribed.  A 30 day supply with no refills is the maximum allowed.  If your prescription is due for a refill, you may be due for a follow up appointment.  To best provide you care, patients receiving routine medications need to be seen at least once a year.  Patients receiving narcotic/controlled substance medications need to be seen at least once every 3 months.  In the event that your preferred pharmacy does not have the requested medication in stock (e.g. Backordered), it is your responsibility to find another pharmacy that has the requested medication  available.  We will gladly send a new prescription to that pharmacy at your request.    Scheduling Tests:    If your physician has ordered radiology tests such as MRI or CT scans, please contact Central Scheduling at [phone] right away to schedule the test.  Once scheduled, the [facility] Centralized Referral Team will work with your insurance carrier to obtain pre-certification or prior authorization.  Depending on your insurance carrier, approval may take 3-10 days.  It is highly recommended patients assure they have received an authorization before having a test performed.  If test is done without insurance authorization, patient may be responsible for the entire amount billed.      Precertification and Prior Authorizations:  If your physician has recommended that you have a procedure or additional testing performed the [facility] Centralized Referral Team will contact your insurance carrier to obtain pre-certification or prior authorization.    You are strongly encouraged to contact your insurance carrier to verify that your procedure/test has been approved and is a COVERED benefit.  Although the [facility] Centralized Referral Team does its due diligence, the insurance carrier gives the disclaimer that \"Although the procedure is authorized, this does not guarantee payment.\"    Ultimately the patient is responsible for payment.   Thank you for your understanding in this matter.  Paperwork Completion:  If you require FMLA or disability paperwork for your recovery, please make sure to either drop it off or have it faxed to our office at [phone]. Be sure the form has your name and date of birth on it.  The form will be faxed to our Forms Department and they will complete it for you.  There is a 25$ fee for all forms that need to be filled out.  Please be aware there is a 10-14 day turnaround time.  You will need to sign a release of information (SHELLEY) form if your paperwork does not come with one.  You may call the Forms  Department with any questions at [phone].  Their fax number is [phone].

## 2024-03-11 ENCOUNTER — PATIENT OUTREACH (OUTPATIENT)
Dept: CASE MANAGEMENT | Age: 89
End: 2024-03-11

## 2024-03-11 NOTE — PROGRESS NOTES
1st attempt ER f/up apt request  No answer, LVMTCB to schedule apts  PCP -unable to contact  NEURO -existing apt (3/6)  Closing encounter

## 2024-03-15 ENCOUNTER — HOSPITAL ENCOUNTER (OUTPATIENT)
Dept: CV DIAGNOSTICS | Facility: HOSPITAL | Age: 89
Discharge: HOME | End: 2024-03-15
Attending: STUDENT IN AN ORGANIZED HEALTH CARE EDUCATION/TRAINING PROGRAM
Payer: MEDICARE

## 2024-03-15 DIAGNOSIS — R29.818 TRANSIENT NEUROLOGIC DEFICIT: ICD-10-CM

## 2024-03-15 DIAGNOSIS — R47.01 NON-FLUENT APHASIA: ICD-10-CM

## 2024-03-15 PROCEDURE — 93306 TTE W/DOPPLER COMPLETE: CPT | Performed by: STUDENT IN AN ORGANIZED HEALTH CARE EDUCATION/TRAINING PROGRAM

## 2024-04-16 ENCOUNTER — TELEPHONE (OUTPATIENT)
Dept: NEUROLOGY | Facility: CLINIC | Age: 89
End: 2024-04-16

## 2024-04-16 RX ORDER — LEVOTHYROXINE SODIUM 0.05 MG/1
50 TABLET ORAL
Qty: 90 TABLET | Refills: 0 | Status: SHIPPED | OUTPATIENT
Start: 2024-04-16

## 2024-04-16 NOTE — TELEPHONE ENCOUNTER
Patient cancelled appointment with  for 04/19/24 and is asking if she really needs to have the MRI done. Patient stated she had the TIA back in the beginning of March and she feels good now. Please advice

## 2024-04-18 NOTE — TELEPHONE ENCOUNTER
Can someone please relay to patient that MRI will provide better insights regarding the cause of her speech arrest- even though it happened in the past it could still show us insights into etiology. Her clinical description didn't sound completley convincing for stroke or transient ischemic attack (TIA) so I don't feel as strongly that she get the image as I may for others. At the end of the day, it's of course her call as to whether or not to pursue.     Spoke to patient and she states at this time she would like to wait until something happens before she obtains the imaging. . She states she is aware of what could happen in the future.

## 2024-04-26 DIAGNOSIS — F41.9 ANXIETY: ICD-10-CM

## 2024-04-26 DIAGNOSIS — F32.5 MAJOR DEPRESSIVE DISORDER IN REMISSION, UNSPECIFIED WHETHER RECURRENT (HCC): ICD-10-CM

## 2024-04-26 RX ORDER — ESCITALOPRAM OXALATE 10 MG/1
10 TABLET ORAL DAILY
Qty: 90 TABLET | Refills: 0 | Status: SHIPPED | OUTPATIENT
Start: 2024-04-26

## 2024-04-26 NOTE — TELEPHONE ENCOUNTER
A refill request was received for:  Requested Prescriptions     Pending Prescriptions Disp Refills    ESCITALOPRAM 10 MG Oral Tab [Pharmacy Med Name: Escitalopram Oxalate 10 Mg Tab Auro] 90 tablet 0     Sig: TAKE ONE TABLET BY MOUTH ONCE DAILY   Last refill date:1/29/2024    Last office visit:2/5/2024     Follow up due:  No future appointments.

## 2024-05-02 ENCOUNTER — OFFICE VISIT (OUTPATIENT)
Dept: FAMILY MEDICINE CLINIC | Facility: CLINIC | Age: 89
End: 2024-05-02
Payer: MEDICARE

## 2024-05-02 VITALS
OXYGEN SATURATION: 98 % | HEART RATE: 81 BPM | WEIGHT: 144 LBS | TEMPERATURE: 98 F | DIASTOLIC BLOOD PRESSURE: 66 MMHG | RESPIRATION RATE: 16 BRPM | BODY MASS INDEX: 26.5 KG/M2 | HEIGHT: 62 IN | SYSTOLIC BLOOD PRESSURE: 135 MMHG

## 2024-05-02 DIAGNOSIS — L98.9 SKIN LESION: Primary | ICD-10-CM

## 2024-05-02 PROCEDURE — 99202 OFFICE O/P NEW SF 15 MIN: CPT | Performed by: NURSE PRACTITIONER

## 2024-05-02 PROCEDURE — 3075F SYST BP GE 130 - 139MM HG: CPT | Performed by: NURSE PRACTITIONER

## 2024-05-02 PROCEDURE — 1160F RVW MEDS BY RX/DR IN RCRD: CPT | Performed by: NURSE PRACTITIONER

## 2024-05-02 PROCEDURE — 1159F MED LIST DOCD IN RCRD: CPT | Performed by: NURSE PRACTITIONER

## 2024-05-02 PROCEDURE — 3078F DIAST BP <80 MM HG: CPT | Performed by: NURSE PRACTITIONER

## 2024-05-02 PROCEDURE — 3008F BODY MASS INDEX DOCD: CPT | Performed by: NURSE PRACTITIONER

## 2024-05-02 NOTE — PROGRESS NOTES
Subjective:   Patient ID: Bella Kirby is a 88 year old female.    Patient is an 88 year old female who presents today with complaints of a skin lesion to her right anterior shin x 2 weeks. Started as a blister looking lesions which has changed over time. It has irregular borders and growing in size. Is red, raised and crusted in areas. Has a history of melanoma. Denies pain, itching or bleeding/drainage from lesion. Attempted treatment prior to arrival = none. Has an appointment with her dermatologist at the end of August for a skin check.        History/Other:   Review of Systems   Skin:         + lesion     Current Outpatient Medications   Medication Sig Dispense Refill    escitalopram 10 MG Oral Tab Take 1 tablet (10 mg total) by mouth daily. 90 tablet 0    LEVOTHYROXINE 50 MCG Oral Tab TAKE ONE TABLET BY MOUTH ONCE DAILY BEFORE BREAKFAST 90 tablet 0    LORazepam 0.5 MG Oral Tab Take 1 tablet (0.5 mg total) by mouth as needed for Anxiety (Take 30 minutes before mri, do not drive after taking). 1 tablet 0    ondansetron 4 MG Oral Tablet Dispersible Take 1 tablet (4 mg total) by mouth every 8 (eight) hours as needed for Nausea. (Patient not taking: Reported on 3/6/2024) 20 tablet 0    ampicillin 500 MG Oral Cap  (Patient not taking: Reported on 3/6/2024)      triamcinolone 0.1 % External Cream       METOPROLOL SUCCINATE ER 25 MG Oral Tablet 24 Hr Take 1 tablet (25 mg total) by mouth nightly. 90 tablet 1    hydrocortisone 2.5 % External Cream Apply thin layer to affected areas twice daily x 2 weeks on and 2 weeks off, then restart 30 g 2    LOSARTAN POTASSIUM 25 MG Oral Tab TAKE ONE TABLET BY MOUTH ONCE DAILY (Patient taking differently: PT states she takes this medication at night only 10:30/11) 90 tablet 3    Rivaroxaban (XARELTO) 20 MG Oral Tab Take 1 tablet (20 mg total) by mouth daily. (Patient taking differently: Take 1 tablet (20 mg total) by mouth daily. Takes nightly also) 90 tablet 3    melatonin 3 MG  Oral Tab Take 0.5 tablets (1.5 mg total) by mouth nightly. (Patient not taking: Reported on 3/6/2024)      Probiotic Product (PROBIOTIC DAILY OR) Take by mouth daily.      Multiple Vitamin (MULTI-VITAMIN DAILY) Oral Tab Take by mouth daily.        Misc Natural Products (LUTEIN 20 OR) Take by mouth daily.        Ascorbic Acid (SUPER C COMPLEX OR) Take by mouth daily.         Allergies:  Allergies   Allergen Reactions    Acyclovir OTHER (SEE COMMENTS)    Amoxicillin     Atorvastatin MYALGIA and OTHER (SEE COMMENTS)    Azithromycin OTHER (SEE COMMENTS)    Brinzolamide OTHER (SEE COMMENTS)    Brompheniramine-Phenylephrine OTHER (SEE COMMENTS)    Cefaclor ANAPHYLAXIS     Headache    Headache  Headache    Cephalexin OTHER (SEE COMMENTS)    Cinoxacin OTHER (SEE COMMENTS)    Clarithromycin OTHER (SEE COMMENTS)    Cyclobenzaprine OTHER (SEE COMMENTS)    Darvon [Propoxyphene]     Diphenoxylate-Atropine OTHER (SEE COMMENTS)    Doxycycline OTHER (SEE COMMENTS)    Epinephrine OTHER (SEE COMMENTS)    Ibuprofen OTHER (SEE COMMENTS)    Latex OTHER (SEE COMMENTS)    Loperamide OTHER (SEE COMMENTS)    Loracarbef OTHER (SEE COMMENTS)    Mayonaise     Methylprednisolone OTHER (SEE COMMENTS)    Metronidazole OTHER (SEE COMMENTS)     Flagyl  Flagyl  Flagyl    Miconazole OTHER (SEE COMMENTS)    Nitrofurantoin OTHER (SEE COMMENTS)    Olive Oil OTHER (SEE COMMENTS)    Paxil [Paroxetine]     Pyridin [Phenazopyridine]     Rosuvastatin OTHER (SEE COMMENTS)     Agitation   Agitation   Agitation     Sulfamethoxazole      Throat swelling    Synalgos Dc     Donnatal [Belladonna Alk-Phenobarbital]     Gantrisin [Sulfisoxazole]     Smoke OTHER (SEE COMMENTS)     Eyes water and cough when she is around smoke     Sudafed [Pseudoephedrine]     Trazodone     Ampicillin     Bactrim [Sulfamethoxazole W/Trimethoprim]     Dimetapp Cold-Allergy [Brompheniramine-Phenylephrine]     Effexor [Venlafaxine]      Headache      Mycostatin [Nystatin]     Radiology  Contrast Iodinated Dyes RASH     MRI contrast per patient     /66   Pulse 81   Temp 97.8 °F (36.6 °C)   Resp 16   Ht 5' 2\" (1.575 m)   Wt 144 lb (65.3 kg)   SpO2 98%   BMI 26.34 kg/m²       Objective:   Physical Exam  Vitals reviewed.   Constitutional:       General: She is awake. She is not in acute distress.     Appearance: Normal appearance. She is well-developed and well-groomed. She is not ill-appearing, toxic-appearing or diaphoretic.   Skin:     General: Skin is warm and dry.      Findings: Rash present. Rash is crusting and papular.          Neurological:      Mental Status: She is alert and oriented to person, place, and time.   Psychiatric:         Behavior: Behavior is cooperative.         Assessment & Plan:   1. Skin lesion        No orders of the defined types were placed in this encounter.      Meds This Visit:  Requested Prescriptions      No prescriptions requested or ordered in this encounter     Discussed with patient that due to HPI, duration of symptoms and clinical exam findings, I suggest f/u with derm for definitive diagnosis.  Has an appointment at the end of August. I advised she call to notify them of new appearing lesion and see if they can see her sooner.  She v/u and has no further questions/concerns at this time.    Patient Instructions   Call your dermatologist office to get a sooner appointment due to new appearing skin lesion

## 2024-05-12 ENCOUNTER — OFFICE VISIT (OUTPATIENT)
Dept: FAMILY MEDICINE CLINIC | Facility: CLINIC | Age: 89
End: 2024-05-12
Payer: MEDICARE

## 2024-05-12 VITALS
DIASTOLIC BLOOD PRESSURE: 76 MMHG | BODY MASS INDEX: 26.5 KG/M2 | HEART RATE: 74 BPM | OXYGEN SATURATION: 97 % | RESPIRATION RATE: 18 BRPM | SYSTOLIC BLOOD PRESSURE: 146 MMHG | TEMPERATURE: 99 F | WEIGHT: 144 LBS | HEIGHT: 62 IN

## 2024-05-12 DIAGNOSIS — T14.90XD HEALING WOUND: Primary | ICD-10-CM

## 2024-05-12 PROCEDURE — 3077F SYST BP >= 140 MM HG: CPT | Performed by: NURSE PRACTITIONER

## 2024-05-12 PROCEDURE — 1159F MED LIST DOCD IN RCRD: CPT | Performed by: NURSE PRACTITIONER

## 2024-05-12 PROCEDURE — 3078F DIAST BP <80 MM HG: CPT | Performed by: NURSE PRACTITIONER

## 2024-05-12 PROCEDURE — 3008F BODY MASS INDEX DOCD: CPT | Performed by: NURSE PRACTITIONER

## 2024-05-12 PROCEDURE — 99213 OFFICE O/P EST LOW 20 MIN: CPT | Performed by: NURSE PRACTITIONER

## 2024-05-12 PROCEDURE — 1160F RVW MEDS BY RX/DR IN RCRD: CPT | Performed by: NURSE PRACTITIONER

## 2024-05-13 ENCOUNTER — TELEPHONE (OUTPATIENT)
Dept: FAMILY MEDICINE CLINIC | Facility: CLINIC | Age: 89
End: 2024-05-13

## 2024-05-13 DIAGNOSIS — T14.8XXA WOUND INFECTION: Primary | ICD-10-CM

## 2024-05-13 DIAGNOSIS — L08.9 WOUND INFECTION: Primary | ICD-10-CM

## 2024-05-13 RX ORDER — AMPICILLIN 500 MG/1
500 CAPSULE ORAL 4 TIMES DAILY
Qty: 28 CAPSULE | Refills: 0 | Status: SHIPPED | OUTPATIENT
Start: 2024-05-13

## 2024-05-13 NOTE — TELEPHONE ENCOUNTER
Patient walked in to office with c/o right anterior lower leg wound. She underwent biopsy at dermatology office 5/6/24. Since then the area has become red, swollen, and very tender. There is yellow discoloration over the opening in the skin but no purulent drainage. No odor. No fever. She is applying vaseline/aquaphor and bandage daily as directed by derm but is worried about an infection. She cannot sleep due to pain. On exam there is approx 1 cm opening in the skin with yellow granular tissue overlying. There is surrounding erythema spreading outward and TTP with guarding of the area. Likely mild infection. She has multiple allergies to medication and can only tolerate ampicillin. Advised to take this as directed for 7 days and continue home wound care. Follow up if symptoms worsen or do not improve in the next few days.

## 2024-05-13 NOTE — PROGRESS NOTES
CHIEF COMPLAINT:     Chief Complaint   Patient presents with    Other     Pt wants to make sure biopsy is not infected.  Small lesion on right lower ankle        HPI:   Bella Kirby is a 88 year old female who presents for evaluation of a skin biopsy site. Patient states that she is concerned that area is infected.Biopsy done 6 days ago, has been applying vaseine to biopsy site:right distal tibia. It is  characterized by slight erythema surrounding site. Patient has treated it with vaseline. Denies cough, eye pain, edema, fatigue, joint pain, SOB, CP or abdominal pain.      Current Outpatient Medications   Medication Sig Dispense Refill    escitalopram 10 MG Oral Tab Take 1 tablet (10 mg total) by mouth daily. 90 tablet 0    LEVOTHYROXINE 50 MCG Oral Tab TAKE ONE TABLET BY MOUTH ONCE DAILY BEFORE BREAKFAST 90 tablet 0    LORazepam 0.5 MG Oral Tab Take 1 tablet (0.5 mg total) by mouth as needed for Anxiety (Take 30 minutes before mri, do not drive after taking). 1 tablet 0    ondansetron 4 MG Oral Tablet Dispersible Take 1 tablet (4 mg total) by mouth every 8 (eight) hours as needed for Nausea. (Patient not taking: Reported on 3/6/2024) 20 tablet 0    ampicillin 500 MG Oral Cap  (Patient not taking: Reported on 3/6/2024)      triamcinolone 0.1 % External Cream       METOPROLOL SUCCINATE ER 25 MG Oral Tablet 24 Hr Take 1 tablet (25 mg total) by mouth nightly. 90 tablet 1    hydrocortisone 2.5 % External Cream Apply thin layer to affected areas twice daily x 2 weeks on and 2 weeks off, then restart 30 g 2    LOSARTAN POTASSIUM 25 MG Oral Tab TAKE ONE TABLET BY MOUTH ONCE DAILY (Patient taking differently: PT states she takes this medication at night only 10:30/11) 90 tablet 3    Rivaroxaban (XARELTO) 20 MG Oral Tab Take 1 tablet (20 mg total) by mouth daily. (Patient taking differently: Take 1 tablet (20 mg total) by mouth daily. Takes nightly also) 90 tablet 3    melatonin 3 MG Oral Tab Take 0.5 tablets (1.5 mg  total) by mouth nightly. (Patient not taking: Reported on 3/6/2024)      Probiotic Product (PROBIOTIC DAILY OR) Take by mouth daily.      Multiple Vitamin (MULTI-VITAMIN DAILY) Oral Tab Take by mouth daily.        Misc Natural Products (LUTEIN 20 OR) Take by mouth daily.        Ascorbic Acid (SUPER C COMPLEX OR) Take by mouth daily.          Past Medical History:    Anxiety    Back injury    Essential hypertension    Infection of tooth    Stroke (HCC)    Thyroid disease    TIA (transient ischemic attack)    6/23/19      Past Surgical History:   Procedure Laterality Date    Hysterectomy      partial    Skin surgery  11/10/2021    BCC left sandy of jaw mohs by Dr Victor      Family History   Problem Relation Age of Onset    Stroke Mother     Heart Disorder Father     Other (Down's Syndrome) Sister     Other (hypotension) Brother 84        orthostatsis due to Parkinson's    Other (hypoglycemia) Brother 81    Other (Parkinson's disease) Brother     Hyperlipidemia Brother     Hyperlipidemia Brother     Other (malabsorption) Daughter     Hyperlipidemia Daughter     Other (cardiac shunt) Daughter         repaired age 17 surgically    Hyperlipidemia Daughter     Other (osteoarthritis) Daughter         hip      Social History     Socioeconomic History    Marital status:    Tobacco Use    Smoking status: Never    Smokeless tobacco: Never   Vaping Use    Vaping status: Never Used   Substance and Sexual Activity    Alcohol use: No    Drug use: No   Other Topics Concern    Caffeine Concern Yes     Comment: ice tea with caffeine occasionally; dark chocolate sometimes    Exercise Yes     Comment: little         REVIEW OF SYSTEMS:   GENERAL:  usual appetite  SKIN: see HPI  HEENT: denies nasal congestion, sore throat    LUNGS: denies shortness of breath or wheezing  CARDIOVASCULAR: denies chest pain or palpitations   GI: denies N/V/C or abdominal pain  NEURO:denies headache    EXAM:   /76   Pulse 74   Temp 98.8 °F  (37.1 °C) (Oral)   Resp 18   Ht 5' 2\" (1.575 m)   Wt 144 lb (65.3 kg)   SpO2 97%   BMI 26.34 kg/m²   GENERAL: A&O x 3, no acute distress  SKIN: biopsy site: right distal shin: Biopsy site with scant erythema on biopsy margins, center of site:+ granulation tissue present.  LUNGS: non labored breathing,  clear to auscultation bilaterally, no wheezing, rales or rhonchi  CARDIO: RRR without murmur  EXTREMITIES: no cyanosis, clubbing or edema  LYMPH:  no cervical lymphadenopathy.          ASSESSMENT:   Bella Kirby is a 88 year old female who presented for evaluation of a dermatological issue following a biopsy 6 days ago.Site is without increased warmth to touch, scant erythema along biopsy margins,no drainage present. Denies pain unless site is \"bumped\". Symptoms are consistent with:    Encounter Diagnosis   Name Primary?    Healing wound Yes         PLAN   Reassurance given that at this moment this biopsy site is without signs of infection.  Skin care discussed with the patient: Reviewed skin care given by  provider at the office visit when biopsy was done .   Educated on signs of skin infection with patient.     No orders of the defined types were placed in this encounter.  Please observe your infection closely.  If you develop worsening or changing symptoms, redness that increases, increase in pain, new or worsening fever, red streaks going up from wound, increase in drainage, or lack of improvement with treatment given please seek immediate care at the ER as these can be signs of more serious illness requiring emergent evaluation and treatment.       Follow up prn or with PCP if symptoms worsen or persist within 2-3 days as discussed.  Patient understands and agrees with plan.   Ashleigh Young, APRN  5/13/2024  9:24 AM

## 2024-06-13 ENCOUNTER — OFFICE VISIT (OUTPATIENT)
Dept: FAMILY MEDICINE CLINIC | Facility: CLINIC | Age: 89
End: 2024-06-13
Payer: MEDICARE

## 2024-06-13 ENCOUNTER — HOSPITAL ENCOUNTER (OUTPATIENT)
Dept: ULTRASOUND IMAGING | Facility: HOSPITAL | Age: 89
Discharge: HOME | End: 2024-06-13
Attending: FAMILY MEDICINE
Payer: MEDICARE

## 2024-06-13 VITALS
RESPIRATION RATE: 15 BRPM | SYSTOLIC BLOOD PRESSURE: 130 MMHG | HEART RATE: 80 BPM | BODY MASS INDEX: 26.5 KG/M2 | HEIGHT: 62 IN | DIASTOLIC BLOOD PRESSURE: 60 MMHG | OXYGEN SATURATION: 97 % | WEIGHT: 144 LBS

## 2024-06-13 DIAGNOSIS — M79.89 LEFT LEG SWELLING: ICD-10-CM

## 2024-06-13 DIAGNOSIS — T78.40XA ALLERGIC REACTION, INITIAL ENCOUNTER: Primary | ICD-10-CM

## 2024-06-13 DIAGNOSIS — M79.605 LEFT LEG PAIN: ICD-10-CM

## 2024-06-13 DIAGNOSIS — R44.8 PARESTHESIA OF TONGUE: ICD-10-CM

## 2024-06-13 PROCEDURE — 93971 EXTREMITY STUDY: CPT | Performed by: FAMILY MEDICINE

## 2024-06-13 PROCEDURE — 1159F MED LIST DOCD IN RCRD: CPT | Performed by: FAMILY MEDICINE

## 2024-06-13 PROCEDURE — 3078F DIAST BP <80 MM HG: CPT | Performed by: FAMILY MEDICINE

## 2024-06-13 PROCEDURE — 99214 OFFICE O/P EST MOD 30 MIN: CPT | Performed by: FAMILY MEDICINE

## 2024-06-13 PROCEDURE — 3075F SYST BP GE 130 - 139MM HG: CPT | Performed by: FAMILY MEDICINE

## 2024-06-13 PROCEDURE — 1160F RVW MEDS BY RX/DR IN RCRD: CPT | Performed by: FAMILY MEDICINE

## 2024-06-13 PROCEDURE — 3008F BODY MASS INDEX DOCD: CPT | Performed by: FAMILY MEDICINE

## 2024-06-13 RX ORDER — METOPROLOL SUCCINATE 25 MG/1
25 TABLET, EXTENDED RELEASE ORAL 2 TIMES DAILY
COMMUNITY
Start: 2024-06-13

## 2024-06-13 NOTE — PATIENT INSTRUCTIONS
Continue metoprolol 25 mg twice a day.  Hold losartan 25 mg for right now.  Reassess blood pressure and tingling in 1 week.    Ultrasound to assess the leg swelling 1 cm or greater at the ankle than the right leg.    For the ears: We are only solid silver earrings to see if you are allergic to nickel.

## 2024-06-13 NOTE — PROGRESS NOTES
Here with pain in the anterior left leg.  Exactly opposite for where she has skin cancer on the right shin.  Dermatologist was worried she might have a blood clot.    She has tingling of the tongue.  Dermatologist thinks she might just have a dry tongue.  She does have some headaches.  They are mild.  She does sometimes get dizzy.  She would prefer not to have another MRI.    She does note her blood pressures not typically this low.  She takes metoprolol 25 mg twice a day and losartan 25 mg nightly    She has had trouble wearing her earrings.  The right earlobe is been getting swollen and red and painful.    PAST MEDICAL HISTORY:  Past Medical History:    Anxiety    Back injury    Essential hypertension    Infection of tooth    Stroke (HCC)    Thyroid disease    TIA (transient ischemic attack)    6/23/19     PAST SURGICAL HISTORY:  Past Surgical History:   Procedure Laterality Date    Hysterectomy      partial    Skin surgery  11/10/2021    BCC left sandy of jaw mohs by Dr Victor     MEDICATIONS:  Current Outpatient Medications   Medication Sig Dispense Refill    metoprolol succinate ER 25 MG Oral Tablet 24 Hr Take 1 tablet (25 mg total) by mouth in the morning and 1 tablet (25 mg total) before bedtime.      ampicillin 500 MG Oral Cap Take 1 capsule (500 mg total) by mouth 4 (four) times daily. 28 capsule 0    escitalopram 10 MG Oral Tab Take 1 tablet (10 mg total) by mouth daily. 90 tablet 0    LEVOTHYROXINE 50 MCG Oral Tab TAKE ONE TABLET BY MOUTH ONCE DAILY BEFORE BREAKFAST 90 tablet 0    LORazepam 0.5 MG Oral Tab Take 1 tablet (0.5 mg total) by mouth as needed for Anxiety (Take 30 minutes before mri, do not drive after taking). 1 tablet 0    ondansetron 4 MG Oral Tablet Dispersible Take 1 tablet (4 mg total) by mouth every 8 (eight) hours as needed for Nausea. (Patient not taking: Reported on 3/6/2024) 20 tablet 0    ampicillin 500 MG Oral Cap  (Patient not taking: Reported on 3/6/2024)      triamcinolone 0.1 %  External Cream       hydrocortisone 2.5 % External Cream Apply thin layer to affected areas twice daily x 2 weeks on and 2 weeks off, then restart 30 g 2    Rivaroxaban (XARELTO) 20 MG Oral Tab Take 1 tablet (20 mg total) by mouth daily. (Patient taking differently: Take 1 tablet (20 mg total) by mouth daily. Takes nightly also) 90 tablet 3    melatonin 3 MG Oral Tab Take 0.5 tablets (1.5 mg total) by mouth nightly. (Patient not taking: Reported on 3/6/2024)      Probiotic Product (PROBIOTIC DAILY OR) Take by mouth daily.      Multiple Vitamin (MULTI-VITAMIN DAILY) Oral Tab Take by mouth daily.        Misc Natural Products (LUTEIN 20 OR) Take by mouth daily.        Ascorbic Acid (SUPER C COMPLEX OR) Take by mouth daily.         ALLERGIES:   Acyclovir, Amoxicillin, Atorvastatin, Azithromycin, Brinzolamide, Brompheniramine-phenylephrine, Cefaclor, Cephalexin, Cinoxacin, Clarithromycin, Cyclobenzaprine, Darvon [propoxyphene], Diphenoxylate-atropine, Doxycycline, Epinephrine, Ibuprofen, Latex, Loperamide, Loracarbef, Mayonaise, Methylprednisolone, Metronidazole, Miconazole, Nitrofurantoin, Olive oil, Paxil [paroxetine], Pyridin [phenazopyridine], Rosuvastatin, Sulfamethoxazole, Synalgos dc, Donnatal [belladonna alk-phenobarbital], Gantrisin [sulfisoxazole], Smoke, Sudafed [pseudoephedrine], Trazodone, Ampicillin, Bactrim [sulfamethoxazole w/trimethoprim], Dimetapp cold-allergy [brompheniramine-phenylephrine], Effexor [venlafaxine], Mycostatin [nystatin], and Radiology contrast iodinated dyes  FAMILY HISTORY  Family History   Problem Relation Age of Onset    Stroke Mother     Heart Disorder Father     Other (Down's Syndrome) Sister     Other (hypotension) Brother 84        orthostatsis due to Parkinson's    Other (hypoglycemia) Brother 81    Other (Parkinson's disease) Brother     Hyperlipidemia Brother     Hyperlipidemia Brother     Other (malabsorption) Daughter     Hyperlipidemia Daughter     Other (cardiac shunt)  Daughter         repaired age 17 surgically    Hyperlipidemia Daughter     Other (osteoarthritis) Daughter         hip       PHYSICAL EXAM:  /60   Pulse 80   Resp 15   Ht 5' 2\" (1.575 m)   Wt 144 lb (65.3 kg)   SpO2 97%   BMI 26.34 kg/m²     Alert no acute distress breathing comfortably.  Pinna normal.  You can feel an inflammatory bump on the back of both ears where the earrings were at a distance 10 cm distal to the tibial tuberosity, both legs measured 36 cm.  At a distance 10 cm proximal to the lateral malleolus, the left leg is 1 cm larger than the right.  There is no cords.  There is no calf tenderness.  There is no skin breakdown in the front of the left shin.  Her tongue appears normal.  The remainder of the mouth looks clear.  Neck without lymphadenopathy.    ASSESSMENT/PLAN:    1. Left leg pain    - US VENOUS DOPPLER LEG LEFT - DIAG IMG (CPT=93971); Future    2. Left leg swelling    - US VENOUS DOPPLER LEG LEFT - DIAG IMG (CPT=93971); Future    3. Paresthesia of tongue  Watch for evidence of stroke, if she feels the tingling does her tongue, midline can she smile equally and can she raise her eyebrows equally.  If she fails one of the if she needs to let someone know and go to the hospital    4. Allergic reaction, initial encounter  May have developed an allergy to nickel.  Advised to use only as solid silver earrings.         If this note is coded by time based on the Office/Outpatient Evaluation and Management Codes effective January 1, 2021, the time includes reviewing the chart before entering the exam room, the time spent with the patient in face to face discussion and examination, and the time documenting the visit.  It may also include time ordering tests or reviewing test results completed on the same day.

## 2024-07-02 ENCOUNTER — HOSPITAL ENCOUNTER (INPATIENT)
Facility: HOSPITAL | Age: 89
LOS: 1 days | Discharge: HOME | End: 2024-07-04
Attending: EMERGENCY MEDICINE | Admitting: HOSPITALIST
Payer: MEDICARE

## 2024-07-02 ENCOUNTER — APPOINTMENT (OUTPATIENT)
Dept: MRI IMAGING | Facility: HOSPITAL | Age: 89
End: 2024-07-02
Attending: EMERGENCY MEDICINE
Payer: MEDICARE

## 2024-07-02 ENCOUNTER — APPOINTMENT (OUTPATIENT)
Dept: GENERAL RADIOLOGY | Facility: HOSPITAL | Age: 89
End: 2024-07-02
Attending: EMERGENCY MEDICINE
Payer: MEDICARE

## 2024-07-02 ENCOUNTER — APPOINTMENT (OUTPATIENT)
Dept: CT IMAGING | Facility: HOSPITAL | Age: 89
End: 2024-07-02
Attending: EMERGENCY MEDICINE
Payer: MEDICARE

## 2024-07-02 DIAGNOSIS — R40.4 TRANSIENT ALTERATION OF AWARENESS: Primary | ICD-10-CM

## 2024-07-02 DIAGNOSIS — R26.89 POOR BALANCE: ICD-10-CM

## 2024-07-02 PROBLEM — R73.9 HYPERGLYCEMIA: Status: ACTIVE | Noted: 2024-07-02

## 2024-07-02 LAB
ALBUMIN SERPL-MCNC: 3.6 G/DL (ref 3.4–5)
ALBUMIN/GLOB SERPL: 1.1 {RATIO} (ref 1–2)
ALP LIVER SERPL-CCNC: 84 U/L
ALT SERPL-CCNC: 26 U/L
ANION GAP SERPL CALC-SCNC: 9 MMOL/L (ref 0–18)
ANTIBODY SCREEN: NEGATIVE
APTT PPP: 35.3 SECONDS (ref 23–36)
AST SERPL-CCNC: 20 U/L (ref 15–37)
BASOPHILS # BLD AUTO: 0.03 X10(3) UL (ref 0–0.2)
BASOPHILS NFR BLD AUTO: 0.6 %
BILIRUB SERPL-MCNC: 0.4 MG/DL (ref 0.1–2)
BILIRUB UR QL STRIP.AUTO: NEGATIVE
BUN BLD-MCNC: 16 MG/DL (ref 9–23)
CALCIUM BLD-MCNC: 9.6 MG/DL (ref 8.5–10.1)
CHLORIDE SERPL-SCNC: 107 MMOL/L (ref 98–112)
CLARITY UR REFRACT.AUTO: CLEAR
CO2 SERPL-SCNC: 23 MMOL/L (ref 21–32)
COLOR UR AUTO: COLORLESS
CREAT BLD-MCNC: 1.18 MG/DL
EGFRCR SERPLBLD CKD-EPI 2021: 44 ML/MIN/1.73M2 (ref 60–?)
EOSINOPHIL # BLD AUTO: 0.17 X10(3) UL (ref 0–0.7)
EOSINOPHIL NFR BLD AUTO: 3.1 %
ERYTHROCYTE [DISTWIDTH] IN BLOOD BY AUTOMATED COUNT: 12.6 %
GLOBULIN PLAS-MCNC: 3.4 G/DL (ref 2.8–4.4)
GLUCOSE BLD-MCNC: 129 MG/DL (ref 70–99)
GLUCOSE BLD-MCNC: 187 MG/DL (ref 70–99)
GLUCOSE BLD-MCNC: 202 MG/DL (ref 70–99)
GLUCOSE UR STRIP.AUTO-MCNC: NORMAL MG/DL
HCT VFR BLD AUTO: 35.3 %
HGB BLD-MCNC: 12.8 G/DL
IMM GRANULOCYTES # BLD AUTO: 0.01 X10(3) UL (ref 0–1)
IMM GRANULOCYTES NFR BLD: 0.2 %
INR BLD: 0.96 (ref 0.8–1.2)
KETONES UR STRIP.AUTO-MCNC: NEGATIVE MG/DL
LEUKOCYTE ESTERASE UR QL STRIP.AUTO: NEGATIVE
LYMPHOCYTES # BLD AUTO: 1.82 X10(3) UL (ref 1–4)
LYMPHOCYTES NFR BLD AUTO: 33.5 %
MCH RBC QN AUTO: 29.6 PG (ref 26–34)
MCHC RBC AUTO-ENTMCNC: 36.3 G/DL (ref 31–37)
MCV RBC AUTO: 81.7 FL
MONOCYTES # BLD AUTO: 0.31 X10(3) UL (ref 0.1–1)
MONOCYTES NFR BLD AUTO: 5.7 %
NEUTROPHILS # BLD AUTO: 3.09 X10 (3) UL (ref 1.5–7.7)
NEUTROPHILS # BLD AUTO: 3.09 X10(3) UL (ref 1.5–7.7)
NEUTROPHILS NFR BLD AUTO: 56.9 %
NITRITE UR QL STRIP.AUTO: NEGATIVE
OSMOLALITY SERPL CALC.SUM OF ELEC: 295 MOSM/KG (ref 275–295)
PH UR STRIP.AUTO: 7.5 [PH] (ref 5–8)
PLATELET # BLD AUTO: 163 10(3)UL (ref 150–450)
POTASSIUM SERPL-SCNC: 3.7 MMOL/L (ref 3.5–5.1)
PROT SERPL-MCNC: 7 G/DL (ref 6.4–8.2)
PROT UR STRIP.AUTO-MCNC: NEGATIVE MG/DL
PROTHROMBIN TIME: 12.8 SECONDS (ref 11.6–14.8)
RBC # BLD AUTO: 4.32 X10(6)UL
RBC UR QL AUTO: NEGATIVE
RH BLOOD TYPE: POSITIVE
RH BLOOD TYPE: POSITIVE
SODIUM SERPL-SCNC: 139 MMOL/L (ref 136–145)
SP GR UR STRIP.AUTO: 1.02 (ref 1–1.03)
UROBILINOGEN UR STRIP.AUTO-MCNC: NORMAL MG/DL
WBC # BLD AUTO: 5.4 X10(3) UL (ref 4–11)

## 2024-07-02 PROCEDURE — 70496 CT ANGIOGRAPHY HEAD: CPT | Performed by: EMERGENCY MEDICINE

## 2024-07-02 PROCEDURE — 70551 MRI BRAIN STEM W/O DYE: CPT | Performed by: EMERGENCY MEDICINE

## 2024-07-02 PROCEDURE — 99223 1ST HOSP IP/OBS HIGH 75: CPT | Performed by: HOSPITALIST

## 2024-07-02 PROCEDURE — 71045 X-RAY EXAM CHEST 1 VIEW: CPT | Performed by: EMERGENCY MEDICINE

## 2024-07-02 PROCEDURE — 70450 CT HEAD/BRAIN W/O DYE: CPT | Performed by: EMERGENCY MEDICINE

## 2024-07-02 PROCEDURE — 70498 CT ANGIOGRAPHY NECK: CPT | Performed by: EMERGENCY MEDICINE

## 2024-07-02 RX ORDER — ONDANSETRON 2 MG/ML
4 INJECTION INTRAMUSCULAR; INTRAVENOUS ONCE
Status: COMPLETED | OUTPATIENT
Start: 2024-07-02 | End: 2024-07-02

## 2024-07-02 RX ORDER — ASPIRIN 300 MG/1
300 SUPPOSITORY RECTAL ONCE
Status: COMPLETED | OUTPATIENT
Start: 2024-07-02 | End: 2024-07-02

## 2024-07-02 RX ORDER — SODIUM CHLORIDE 9 MG/ML
125 INJECTION, SOLUTION INTRAVENOUS CONTINUOUS
Status: DISCONTINUED | OUTPATIENT
Start: 2024-07-02 | End: 2024-07-04

## 2024-07-02 RX ORDER — MIDAZOLAM HYDROCHLORIDE 1 MG/ML
2 INJECTION INTRAMUSCULAR; INTRAVENOUS ONCE
Status: COMPLETED | OUTPATIENT
Start: 2024-07-02 | End: 2024-07-02

## 2024-07-03 ENCOUNTER — NURSE ONLY (OUTPATIENT)
Dept: ELECTROPHYSIOLOGY | Facility: HOSPITAL | Age: 89
End: 2024-07-03
Attending: INTERNAL MEDICINE
Payer: MEDICARE

## 2024-07-03 LAB
ALBUMIN SERPL-MCNC: 3.2 G/DL (ref 3.4–5)
ALBUMIN/GLOB SERPL: 1 {RATIO} (ref 1–2)
ALP LIVER SERPL-CCNC: 66 U/L
ALT SERPL-CCNC: 21 U/L
ANION GAP SERPL CALC-SCNC: 6 MMOL/L (ref 0–18)
AST SERPL-CCNC: 15 U/L (ref 15–37)
ATRIAL RATE: 88 BPM
BILIRUB SERPL-MCNC: 0.3 MG/DL (ref 0.1–2)
BUN BLD-MCNC: 13 MG/DL (ref 9–23)
CALCIUM BLD-MCNC: 8.8 MG/DL (ref 8.5–10.1)
CHLORIDE SERPL-SCNC: 109 MMOL/L (ref 98–112)
CHOLEST SERPL-MCNC: 243 MG/DL (ref ?–200)
CO2 SERPL-SCNC: 24 MMOL/L (ref 21–32)
CREAT BLD-MCNC: 0.72 MG/DL
EGFRCR SERPLBLD CKD-EPI 2021: 80 ML/MIN/1.73M2 (ref 60–?)
GLOBULIN PLAS-MCNC: 3.2 G/DL (ref 2.8–4.4)
GLUCOSE BLD-MCNC: 102 MG/DL (ref 70–99)
GLUCOSE BLD-MCNC: 107 MG/DL (ref 70–99)
GLUCOSE BLD-MCNC: 113 MG/DL (ref 70–99)
GLUCOSE BLD-MCNC: 122 MG/DL (ref 70–99)
GLUCOSE BLD-MCNC: 124 MG/DL (ref 70–99)
HDLC SERPL-MCNC: 58 MG/DL (ref 40–59)
LDLC SERPL CALC-MCNC: 166 MG/DL (ref ?–100)
MAGNESIUM SERPL-MCNC: 2.2 MG/DL (ref 1.6–2.6)
NONHDLC SERPL-MCNC: 185 MG/DL (ref ?–130)
OSMOLALITY SERPL CALC.SUM OF ELEC: 289 MOSM/KG (ref 275–295)
P AXIS: 72 DEGREES
P-R INTERVAL: 160 MS
PHOSPHATE SERPL-MCNC: 2.5 MG/DL (ref 2.5–4.9)
POTASSIUM SERPL-SCNC: 3.9 MMOL/L (ref 3.5–5.1)
PROT SERPL-MCNC: 6.4 G/DL (ref 6.4–8.2)
Q-T INTERVAL: 358 MS
QRS DURATION: 84 MS
QTC CALCULATION (BEZET): 433 MS
R AXIS: 19 DEGREES
SODIUM SERPL-SCNC: 139 MMOL/L (ref 136–145)
T AXIS: 44 DEGREES
TRIGL SERPL-MCNC: 107 MG/DL (ref 30–149)
VENTRICULAR RATE: 88 BPM
VLDLC SERPL CALC-MCNC: 21 MG/DL (ref 0–30)

## 2024-07-03 PROCEDURE — 99223 1ST HOSP IP/OBS HIGH 75: CPT | Performed by: INTERNAL MEDICINE

## 2024-07-03 PROCEDURE — 99232 SBSQ HOSP IP/OBS MODERATE 35: CPT | Performed by: STUDENT IN AN ORGANIZED HEALTH CARE EDUCATION/TRAINING PROGRAM

## 2024-07-03 RX ORDER — ASPIRIN 325 MG
325 TABLET ORAL DAILY
Status: DISCONTINUED | OUTPATIENT
Start: 2024-07-03 | End: 2024-07-03 | Stop reason: DRUGHIGH

## 2024-07-03 RX ORDER — METOCLOPRAMIDE HYDROCHLORIDE 5 MG/ML
5 INJECTION INTRAMUSCULAR; INTRAVENOUS EVERY 8 HOURS PRN
Status: DISCONTINUED | OUTPATIENT
Start: 2024-07-03 | End: 2024-07-04

## 2024-07-03 RX ORDER — ASPIRIN 300 MG/1
300 SUPPOSITORY RECTAL DAILY
Status: DISCONTINUED | OUTPATIENT
Start: 2024-07-03 | End: 2024-07-03 | Stop reason: DRUGHIGH

## 2024-07-03 RX ORDER — SODIUM CHLORIDE 9 MG/ML
INJECTION, SOLUTION INTRAVENOUS CONTINUOUS
Status: DISCONTINUED | OUTPATIENT
Start: 2024-07-03 | End: 2024-07-03

## 2024-07-03 RX ORDER — ONDANSETRON 2 MG/ML
4 INJECTION INTRAMUSCULAR; INTRAVENOUS EVERY 4 HOURS PRN
Status: ACTIVE | OUTPATIENT
Start: 2024-07-03 | End: 2024-07-03

## 2024-07-03 RX ORDER — METOPROLOL SUCCINATE 25 MG/1
25 TABLET, EXTENDED RELEASE ORAL
Status: DISCONTINUED | OUTPATIENT
Start: 2024-07-03 | End: 2024-07-04

## 2024-07-03 RX ORDER — LABETALOL HYDROCHLORIDE 5 MG/ML
10 INJECTION, SOLUTION INTRAVENOUS EVERY 10 MIN PRN
Status: DISCONTINUED | OUTPATIENT
Start: 2024-07-03 | End: 2024-07-04

## 2024-07-03 RX ORDER — ACETAMINOPHEN 325 MG/1
650 TABLET ORAL EVERY 4 HOURS PRN
Status: DISCONTINUED | OUTPATIENT
Start: 2024-07-03 | End: 2024-07-04

## 2024-07-03 RX ORDER — ACETAMINOPHEN 325 MG/1
650 TABLET ORAL EVERY 4 HOURS PRN
Status: DISCONTINUED | OUTPATIENT
Start: 2024-07-03 | End: 2024-07-03

## 2024-07-03 RX ORDER — SODIUM CHLORIDE 9 MG/ML
INJECTION, SOLUTION INTRAVENOUS CONTINUOUS
Status: DISCONTINUED | OUTPATIENT
Start: 2024-07-03 | End: 2024-07-04

## 2024-07-03 RX ORDER — LEVOTHYROXINE SODIUM 0.05 MG/1
50 TABLET ORAL
Status: DISCONTINUED | OUTPATIENT
Start: 2024-07-03 | End: 2024-07-04

## 2024-07-03 RX ORDER — EZETIMIBE 10 MG/1
10 TABLET ORAL NIGHTLY
Status: DISCONTINUED | OUTPATIENT
Start: 2024-07-03 | End: 2024-07-04

## 2024-07-03 RX ORDER — ONDANSETRON 2 MG/ML
4 INJECTION INTRAMUSCULAR; INTRAVENOUS EVERY 6 HOURS PRN
Status: DISCONTINUED | OUTPATIENT
Start: 2024-07-03 | End: 2024-07-03

## 2024-07-03 RX ORDER — ASPIRIN 81 MG/1
81 TABLET, CHEWABLE ORAL DAILY
Status: DISCONTINUED | OUTPATIENT
Start: 2024-07-04 | End: 2024-07-04

## 2024-07-03 RX ORDER — SENNA AND DOCUSATE SODIUM 50; 8.6 MG/1; MG/1
2 TABLET, FILM COATED ORAL
Status: DISCONTINUED | OUTPATIENT
Start: 2024-07-03 | End: 2024-07-04

## 2024-07-03 RX ORDER — ACETAMINOPHEN 650 MG/1
650 SUPPOSITORY RECTAL EVERY 4 HOURS PRN
Status: DISCONTINUED | OUTPATIENT
Start: 2024-07-03 | End: 2024-07-04

## 2024-07-03 RX ORDER — ESCITALOPRAM OXALATE 10 MG/1
10 TABLET ORAL DAILY
Status: DISCONTINUED | OUTPATIENT
Start: 2024-07-03 | End: 2024-07-04

## 2024-07-03 RX ORDER — ACETAMINOPHEN 650 MG/1
650 SUPPOSITORY RECTAL EVERY 4 HOURS PRN
Status: DISCONTINUED | OUTPATIENT
Start: 2024-07-03 | End: 2024-07-03

## 2024-07-03 RX ORDER — POLYETHYLENE GLYCOL 3350 17 G/17G
17 POWDER, FOR SOLUTION ORAL DAILY PRN
Status: DISCONTINUED | OUTPATIENT
Start: 2024-07-03 | End: 2024-07-04

## 2024-07-03 RX ORDER — HYDRALAZINE HYDROCHLORIDE 20 MG/ML
10 INJECTION INTRAMUSCULAR; INTRAVENOUS EVERY 2 HOUR PRN
Status: DISCONTINUED | OUTPATIENT
Start: 2024-07-03 | End: 2024-07-04

## 2024-07-03 RX ORDER — ONDANSETRON 2 MG/ML
4 INJECTION INTRAMUSCULAR; INTRAVENOUS EVERY 6 HOURS PRN
Status: DISCONTINUED | OUTPATIENT
Start: 2024-07-03 | End: 2024-07-04

## 2024-07-03 NOTE — PLAN OF CARE
Assumed care at 0730  A/Ox4  Neuros Q2 until 1000, then Neuro's Q4, No new defiects see flowsheet.  Rm Air  NSR on tele  Denies pain  IVF infusing per order  BRP  X1 assist w/ walker to ambulate  EEG done, awaiting results  Safety precautions maintained  Call light in reach of patient.

## 2024-07-03 NOTE — CONSULTS
[facility]  CARLIE Neurology Preliminary Note    Bella Kirby Patient Status:  Inpatient    1935 MRN [id number]   Location [facility] 7NE-A Attending Jaciel Elias, DO   Hosp Day # 0 PCP David Canchola MD     REASON FOR EVALUATION: Aphasia, confusion, possible stroke/TIA      HISTORY OF PRESENT ILLNESS: Bella Kirby is an 88 year old female with past medical history significant for previous stroke, TIAs, hypertension, hyperlipidemia, hypothyroidism, paroxysmal atrial fibrillation, on Xarelto, and anxiety who presented to the ER on  with an episode of confusion and expressive aphasia. She describes the episode as having hard time saying where she is. According to EMR the patient was not able to provide significant history upon arrival. Per her daughter, patient called her to let her know she was not feeling well, was not able to answer further questions over the phone and hung up the phone. EM was called and she was brought to the ED.  Upon arrival, patient was awake, alert to self, moving all extremities spontaneously. Denied headache no visual changes. No numbness or tingling in the face, upper or lower extremities. No fevers, chills, nausea, vomiting, diarrhea or constipation. CT head was unrevealing. CTA head and neck with no LVO or significant stenosis. MR brain was ordered. Neurology was consulted for further investigations with a concern for possible stroke vs TIA.     Seen today, currently in bed, sitting up. Awake, alert and oriented x 4. Denies any acute complaints at this time. No headache, no changes in vision, no speech difficulties. Denies any focal weakness or paresthesias. Non focal exam.          Pre-admit mRS 1      PAST MEDICAL HISTORY:  Past Medical History:    Anxiety    Arthritis    Back, hips, hands    Back injury    Bone spur of other site    R shoulder    Essential hypertension    Infection of tooth    Stroke (HCC)    Thyroid disease    TIA (transient ischemic attack)     6/23/19       PAST SURGICAL HISTORY:  Past Surgical History:   Procedure Laterality Date    Hysterectomy  1977    partial    Skin surgery  11/10/2021    BCC left sandy of jaw mohs by Dr Victor       FAMILY HISTORY:  family history includes Down's Syndrome in her sister; Heart Disorder in her father; Hyperlipidemia in her brother, brother, daughter, and daughter; Parkinson's disease in her brother; Stroke in her mother; cardiac shunt in her daughter; hypoglycemia (age of onset: 81) in her brother; hypotension (age of onset: 84) in her brother; malabsorption in her daughter; osteoarthritis in her daughter.    SOCIAL HISTORY:   reports that she has never smoked. She has never used smokeless tobacco. She reports that she does not drink alcohol and does not use drugs.    ALLERGIES:  Allergies   Allergen Reactions    Acyclovir OTHER (SEE COMMENTS)    Amoxicillin     Atorvastatin MYALGIA and OTHER (SEE COMMENTS)    Azithromycin OTHER (SEE COMMENTS)    Brinzolamide OTHER (SEE COMMENTS)    Brompheniramine-Phenylephrine OTHER (SEE COMMENTS)    Cefaclor ANAPHYLAXIS     Headache    Headache  Headache    Cephalexin OTHER (SEE COMMENTS)    Cinoxacin OTHER (SEE COMMENTS)    Clarithromycin OTHER (SEE COMMENTS)    Cyclobenzaprine OTHER (SEE COMMENTS)    Darvon [Propoxyphene]     Diphenoxylate-Atropine OTHER (SEE COMMENTS)    Doxycycline OTHER (SEE COMMENTS)    Epinephrine OTHER (SEE COMMENTS)    Ibuprofen OTHER (SEE COMMENTS)    Latex OTHER (SEE COMMENTS)    Loperamide OTHER (SEE COMMENTS)    Loracarbef OTHER (SEE COMMENTS)    Mayonaise     Methylprednisolone OTHER (SEE COMMENTS)    Metronidazole OTHER (SEE COMMENTS)     Flagyl  Flagyl  Flagyl    Miconazole OTHER (SEE COMMENTS)    Nitrofurantoin OTHER (SEE COMMENTS)    Olive Oil OTHER (SEE COMMENTS)    Paxil [Paroxetine]     Pyridin [Phenazopyridine]     Rosuvastatin OTHER (SEE COMMENTS)     Agitation   Agitation   Agitation     Sulfamethoxazole      Throat swelling    Synalgos Dc      Donnatal [Belladonna Alk-Phenobarbital]     Gantrisin [Sulfisoxazole]     Smoke OTHER (SEE COMMENTS)     Eyes water and cough when she is around smoke     Sudafed [Pseudoephedrine]     Trazodone     Ampicillin     Bactrim [Sulfamethoxazole W/Trimethoprim]     Dimetapp Cold-Allergy [Brompheniramine-Phenylephrine]     Effexor [Venlafaxine]      Headache      Mycostatin [Nystatin]     Radiology Contrast Iodinated Dyes RASH     MRI contrast per patient       MEDICATIONS:  No current outpatient medications on file.     Current Facility-Administered Medications   Medication Dose Route Frequency    escitalopram (Lexapro) tab 10 mg  10 mg Oral Daily    levothyroxine (Synthroid) tab 50 mcg  50 mcg Oral Before breakfast    metoprolol succinate ER (Toprol XL) 24 hr tab 25 mg  25 mg Oral 2x Daily(Beta Blocker)    rivaroxaban (Xarelto) tab 20 mg  20 mg Oral Nightly    sodium chloride 0.9% infusion   Intravenous Continuous    acetaminophen (Tylenol) tab 650 mg  650 mg Oral Q4H PRN    Or    acetaminophen (Tylenol) rectal suppository 650 mg  650 mg Rectal Q4H PRN    labetalol (Trandate) 5 mg/mL injection 10 mg  10 mg Intravenous Q10 Min PRN    hydrALAzine (Apresoline) 20 mg/mL injection 10 mg  10 mg Intravenous Q2H PRN    ondansetron (Zofran) 4 MG/2ML injection 4 mg  4 mg Intravenous Q6H PRN    metoclopramide (Reglan) 5 mg/mL injection 5 mg  5 mg Intravenous Q8H PRN    aspirin 300 MG rectal suppository 300 mg  300 mg Rectal Daily    Or    aspirin tab 325 mg  325 mg Oral Daily    polyethylene glycol (PEG 3350) (Miralax) 17 g oral packet 17 g  17 g Oral Daily PRN    senna-docusate (Senokot-S) 8.6-50 MG per tab 2 tablet  2 tablet Oral Daily PRN    sodium chloride 0.9% infusion   Intravenous Continuous    acetaminophen (Tylenol) tab 650 mg  650 mg Oral Q4H PRN    Or    acetaminophen (Tylenol) rectal suppository 650 mg  650 mg Rectal Q4H PRN    ondansetron (Zofran) 4 MG/2ML injection 4 mg  4 mg Intravenous Q6H PRN    [START ON  7/4/2024] aspirin chewable tab 81 mg  81 mg Oral Daily    ezetimibe (Zetia) tab 10 mg  10 mg Oral Nightly    sodium chloride 0.9% infusion  125 mL/hr Intravenous Continuous       REVIEW OF SYSTEMS:  A 10-point system was reviewed.  Pertinent positives and negatives are noted in HPI.      PHYSICAL EXAMINATION:  VITAL SIGNS: /58 (BP Location: Left arm)   Pulse 77   Temp 98.7 °F (37.1 °C) (Oral)   Resp 20   Wt 151 lb 1.6 oz (68.5 kg)   SpO2 94%   BMI 27.64 kg/m²   GENERAL:  Patient is a 88 year old female in no acute distress.  PSYCH: Pleasant, cooperative, in no acute distress  HEENT:  Normocephalic, atraumatic  NECK: Symmetrical, atraumatic  LUNGS: Regular rate, no accessory muscle use  HEART: Regular rate and rhythm.  ABD: Soft, non tender  SKIN: Warm, dry, no rashes  EXTREMITIES: Symmetrical     NEUROLOGICAL:   Mental status: Oriented to person, place, situation and time   Speech: Fluent, no obvious aphasia or dysarthria  Memory and comprehension: Intact   Cranial Nerves: VFF, PERRL 3mm brisk, EOMI, no nystagmus, facial sensation intact, face symmetric, tongue midline, shoulder shrug equal, remainder CN grossly intact  Motor: No drift, no focal arm or leg weakness. Motor strength is 5 out of 5 in all extremities.   Sensory: Intact to light touch bilaterally  DTRs: 2+ in UE & LE bilaterally  Coordination: FTN intact  Gait: Deferred        DIAGNOSTIC DATA:  Labs:  Recent Labs   Lab 07/02/24 1957   RBC 4.32   HGB 12.8   HCT 35.3   MCV 81.7   MCH 29.6   MCHC 36.3   RDW 12.6   NEPRELIM 3.09   WBC 5.4   .0       Recent Labs   Lab 07/02/24 1957 07/03/24  0610   * 113*   BUN 16 13   CREATSERUM 1.18* 0.72   EGFRCR 44* 80   CA 9.6 8.8    139   K 3.7 3.9    109   CO2 23.0 24.0           IMAGING:  MRI BRAIN (CPT=70551)    Result Date: 7/2/2024  CONCLUSION:  No acute intracranial process.   LOCATION:  [facility]   Dictated by (CST): Reji Moreno MD on 7/02/2024 at 11:42 PM     Finalized by  (CST): Reji Moreno MD on 7/02/2024 at 11:47 PM       XR CHEST AP PORTABLE  (CPT=71045)    Result Date: 7/2/2024  CONCLUSION:  No evidence of active cardiopulmonary disease.   LOCATION:  [facility]      Dictated by (CST): Clarence Rivera MD on 7/02/2024 at 9:14 PM     Finalized by (CST): Clarence Rivera MD on 7/02/2024 at 9:15 PM       CT STROKE CTA BRAIN/CTA NECK (W IV)(CPT=70496/81121)    Result Date: 7/2/2024  CONCLUSION:   1. No acute intracranial process.  2. CTA of the brain demonstrates no evidence of high-grade stenosis or vessel occlusion.   3. CTA of the neck demonstrates approximately 50% stenosis of the right internal carotid artery.   4.  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  Critical findings discussed Dr. Morris at 2030 hours on 7/2/2024 with read back.    LOCATION:  [facility]   Dictated by (CST): Reji Moreno MD on 7/02/2024 at 8:23 PM     Finalized by (CST): Reji Moreno MD on 7/02/2024 at 8:32 PM       CT STROKE BRAIN (NO IV)(CPT=70450)    Result Date: 7/2/2024  CONCLUSION:  No acute intracranial process.  Critical results were called to Dr. Morris at 2013 hours on 7/2/2024.  There was appropriate read back.    LOCATION:  [facility]   Dictated by (CST): Reji Moreno MD on 7/02/2024 at 8:10 PM     Finalized by (CST): Reji Moreno MD on 7/02/2024 at 8:14 PM                 Patient Active Problem List   Diagnosis    Anxiety    Essential hypertension    Osteopenia    Statin intolerance    Mixed hyperlipidemia    PAF (paroxysmal atrial fibrillation) (HCC)    Hypothyroidism    Calcific tendonitis of right upper arm    Peripheral artery disease (HCC)    Carotid artery stenosis, asymptomatic, right    Claudication of both lower extremities (HCC)    Hyperglycemia    Transient alteration of awareness        Assessment & Plan      An 88 year old female with:    Transient expressive aphasia and confusion - In a setting of hx of previous stroke, pAfib, on DOAC, and age,  HTN, HLD risk factors. Ischemic stroke/TIA protocol initiated:  CT head - no acute pathology  CTA had and neck - no LVO or significant stenosis. Approx 50% of stenosis in the R ICA reported.   MRI sadie - no acute stroke  Echo w/ bubbles - ef 60-65%, no shunt  Stroke labs: X1k-virfexz, Lipid panle with . Patient is sensitive to statins, having myalgia and hallucination reactions. Started on Zetia 10 mg daily. Xarelto at home dose of 20 mg daily continues,  mg given initially, reduced down to 81 mg daily for secondary stroke prophylaxis.   PT/OT/ST evals - appreciate recs  Safety and falls precautions  Confusion, AMS - concerning for possible seizure-like activity. Now back to normal.  Routine EEG to r/o possible seizure-like activity - pending  Telemetry monitoring  Hx of paroxysmal a-fib - currently in sinus rhythm, continue Xarelto and metoprolol.     Discussed with patient.  Dr. Patel to follow with further recommendations.  Is this a shared or split note between Advanced Practice Provider and Physician? Yes       Amparo MUIR  [facility]  7/3/2024, 10:23 AM   [employer] # [id number]

## 2024-07-03 NOTE — PLAN OF CARE
NURSING ADMISSION NOTE    Patient admitted via Wheelchair  Oriented to room.  Safety precautions initiated.  Bed in low position.  Call light in reach.    Assumed care at approx 0200  A/ox4, RA, VSS on tele.  Q2 Neuro assessments done per protocol; see flowsheets.  Pt reports partial loss of vision in her R eye; present since admission. No loss of vision reported in left eye.  Pt also reporting headache localized to R forehead and eye area. Addressed w/ PRN meds.  Up x1/walker to bathroom; SOB on exertion, SpO2 sat w/in parameters.  Neuro IR to c/s. PT/OT to see.  Comfort measures maintained. Pt updated on POC.

## 2024-07-03 NOTE — PAYOR COMM NOTE
--------------  ADMISSION REVIEW     Payor: [employer]  Subscriber #:  [id number]  Authorization Number: [id number]    Admit date: 7/3/24  Admit time:  1:51 AM       REVIEW DOCUMENTATION:    Patient Seen in: [facility] Emergency Department      History     Chief Complaint   Patient presents with    Stroke     Stated Complaint: Stroke    Subjective:   HPI    Patient is a 88-year-old female who is not able to provide significant history.  History obtained primarily from EMS as well as daughter.  Patient called her daughter around 7:10 PM stating she was not feeling well for the last few hours.  Patient was unable to get give much more information and eventually hung up and had a life alert and EMS was called.  Patient was initially talking with paramedics but became less conversant.  Patient did complain of a headache earlier.  Patient unable to provide history other than her name.  Patient does move all extremities.  I did call the daughter who states she was slightly confused when she called her and unknown last time of well.  Remainder of review of systems negative.    Objective:   Past Medical History:    Anxiety    Back injury    Essential hypertension    Infection of tooth    Stroke (HCC)    Thyroid disease    TIA (transient ischemic attack)    6/23/19     Past Surgical History:   Procedure Laterality Date    Hysterectomy      partial    Skin surgery  11/10/2021    BCC left sandy of jaw mohs by Dr Victor     Physical Exam     ED Triage Vitals [07/02/24 2031]   BP (!) 167/83   Pulse 86   Resp 20   Temp 98.8 °F (37.1 °C)   Temp src    SpO2 96 %   O2 Device None (Room air)       Current Vitals:   Vital Signs  BP: 140/86  Pulse: 89  Resp: 20  Temp: 98.8 °F (37.1 °C)    Oxygen Therapy  SpO2: 96 %  O2 Device: None (Room air)    Physical Exam  GENERAL: Patient resting on the cart in no acute distress.  HEENT: Extraocular muscles intact, pupils equal round reactive to light and accommodation.  Mouth  normal, neck supple, no meningismus.  LUNGS: Lungs clear to auscultation bilaterally.  CARDIOVASCULAR: + S1-S2, regular rate and rhythm, no murmurs.  BACK: No CVA tenderness, no midline bony tenderness.  ABDOMEN: + Bowel sounds, soft, nontender, nondistended.  No rebound, no guarding, no hepatosplenomegaly.  EXTREMITIES: Full range of motion, no tenderness, good capillary refill.  SKIN: No rash, good turgor.  NEURO: Patient answers her name.  Patient unable to name the date or provide further information.  Patient does move all extremities.  Follows commands.  5 out of 5 strength upper and lower extremities.  Sensation tact light touch.  No facial droop.  Tongue midline.  Hearing intact.  Normal shoulder shrug.         ED Course     Labs Reviewed   COMP METABOLIC PANEL (14) - Abnormal; Notable for the following components:       Result Value    Glucose 202 (*)     Creatinine 1.18 (*)     eGFR-Cr 44 (*)     All other components within normal limits   URINALYSIS, ROUTINE - Abnormal; Notable for the following components:    Urine Color Colorless (*)     All other components within normal limits   POCT GLUCOSE - Abnormal; Notable for the following components:    POC Glucose 187 (*)     All other components within normal limits   POCT GLUCOSE - Abnormal; Notable for the following components:    POC Glucose 129 (*)     All other components within normal limits   PROTHROMBIN TIME (PT) - Normal   PTT, ACTIVATED - Normal     EKG    Rate, intervals and axes as noted on EKG Report.  Rate: 88  Rhythm: Sinus Rhythm  Reading: Normal sinus rhythm, nonspecific ST changes       CTA head and neck1. No acute intracranial process.      2. CTA of the brain demonstrates no evidence of high-grade stenosis or vessel occlusion.       3. CTA of the neck demonstrates approximately 50% stenosis of the right internal carotid artery.       4.  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     CT  brain No acute intracranial process.   Chest x-rayNo evidence of active cardiopulmonary disease.   Independent reviewed by myself, no pneumothorax    Disposition and Plan     Clinical Impression:  1. Transient alteration of awareness         Signed by Rasta Morris MD on 7/2/2024 11:11 PM         HISTORY AND PHYSICAL      Bella Kirby is a 88 year old female with history of stroke, hypertension, hyperlipidemia, hypothyroidism, paroxysmal atrial fibrillation presents to the ER with confusion and expressive aphasia. Last known normal was at 1900.  No headache no visual changes.  No numbness or tingling in the face, upper or lower extremities     Recent Labs   Lab 07/02/24 1957   RBC 4.32   HGB 12.8   HCT 35.3   MCV 81.7   MCH 29.6   MCHC 36.3   RDW 12.6   NEPRELIM 3.09   WBC 5.4   .0             Recent Labs   Lab 07/02/24 1957   *   BUN 16   CREATSERUM 1.18*   EGFRCR 44*   CA 9.6   ALB 3.6      K 3.7      CO2 23.0   ALKPHO 84   AST 20   ALT 26   BILT 0.4   TP 7.0     Assessment & Plan:  # Aphasia  - Now resolved  - TIA vs Stroke  - CT brain negative  - MRI ordered  - Neurology on consult  - Continue stroke protocol  - continue ASA  - pt has myalgias to statins     # Hypertension  - Will continue on metoprolol.     # Paroxysmal atrial fibrillation  - will continue on xarelto for anticaogulation.  - Will continue on metoprolol for rate control.     # Hypothyroidism  -Continue on levothyroxine.     # Hyperlipidemia  - will continue on statin therapy.         MRI BRAIN     FINDINGS:    INTRACRANIAL:  There is mild T2/FLAIR hyperintensity in the periventricular white matter consistent small vessel ischemic disease..  Diffusion weighted imaging was       performed and is unremarkable.  There is no evidence for acute infarction.  There is    no      evidence of hemorrhage or mass lesion.    VENTRICLES/SULCI:   Ventricles and sulci are normal in caliber.  There are no extra-axial fluid collections.  There is no midline shift.   SINUSES/ORBITS:       The visualized paranasal sinuses are clear.  The orbits are unremarkable.   MASTOIDS:      The mastoids are clear.               MEDICATIONS ADMINISTERED IN LAST 1 DAY:  acetaminophen (Tylenol) tab 650 mg       Date Action Dose Route User    7/3/2024 0654 Given 650 mg Oral Mackenzie Arroyo RN          aspirin 300 MG rectal suppository 300 mg       Date Action Dose Route User    7/2/2024 2142 Given 300 mg Rectal Alex Casas RN          iopamidol 76% (ISOVUE-370) injection for power injector       Date Action Dose Route User    7/2/2024 2020 Given 75 mL Intravenous Lauren Cisneros          levothyroxine (Synthroid) tab 50 mcg       Date Action Dose Route User    7/3/2024 0651 Given 50 mcg Oral Mackenzie Arroyo RN          metoprolol succinate ER (Toprol XL) 24 hr tab 25 mg       Date Action Dose Route User    7/3/2024 0651 Given 25 mg Oral Mackenzie Arroyo RN          midazolam (Versed) 2 MG/2ML injection 2 mg       Date Action Dose Route User    7/2/2024 2301 Given 2 mg Intravenous Kalpesh Moses RN          ondansetron (Zofran) 4 MG/2ML injection 4 mg       Date Action Dose Route User    7/2/2024 2142 Given 4 mg Intravenous Alex Casas RN          rivaroxaban (Xarelto) tab 20 mg       Date Action Dose Route User    7/3/2024 0651 Given 20 mg Oral Mackenzie Arroyo RN          sodium chloride 0.9% infusion       Date Action Dose Route User    7/2/2024 2040 New Bag 125 mL/hr Intravenous Kalpesh Moses RN          sodium chloride 0.9% infusion  75 ML HR       Date Action Dose Route User    7/3/2024 0300 New Bag (none) Intravenous Mackenzie Arroyo RN            Vitals (last day)       Date/Time Temp Pulse Resp BP SpO2 Weight O2 Device O2 Flow Rate (L/min) Who    07/03/24 0400 -- 82 16 166/74 94 % -- None (Room air) -- AB    07/03/24 0400 98.9 °F (37.2 °C) -- -- -- -- -- -- -- CB    07/03/24 0215 98.9 °F (37.2 °C) 84 14 170/69 94 % -- None (Room air) -- CB    07/03/24  0011 -- 80 20 163/80 97 % -- None (Room air) -- LP    07/02/24 2221 -- 89 20 140/86 96 % -- None (Room air) -- LP    07/02/24 2031 98.8 °F (37.1 °C) 86 20 167/83 96 % -- None (Room air) -- LP    07/02/24 1956 -- -- -- -- -- 151 lb 1.6 oz (68.5 kg) -- --

## 2024-07-03 NOTE — PHYSICAL THERAPY NOTE
PHYSICAL THERAPY EVALUATION - INPATIENT     Room Number: 7617/7617-A  Evaluation Date: 7/3/2024  Type of Evaluation: Initial  Physician Order: PT Eval and Treat    Presenting Problem: stroke like symptoms, expressive aphasia, TIA vs stroke  Co-Morbidities : CVA, hypothyroidism, pafib, HTN  Reason for Therapy: Mobility Dysfunction and Discharge Planning    PHYSICAL THERAPY ASSESSMENT   Patient is a 88 year old female admitted 2024 for stroke like symptoms, expressive aphasia.   Patient is currently functioning near baseline with bed mobility, transfers, and gait. Prior to admission, patient's baseline is independent.     Patient will benefit from continued skilled PT Services at discharge to promote functional independence in home.  Anticipate patient will return home with OP PT.    PLAN  Patient has been evaluated and presents with no skilled Physical Therapy needs at this time.  Patient discharged from Physical Therapy services.  Please re-order if a new functional limitation presents during this admission.    GOALS  Patient was able to achieve the following goals ...    Patient was able to transfer Safely and independently   Patient able to ambulate on level surfaces Safely and independently with RW         HOME SITUATION  Type of Home: Condo   Home Layout: One level;Elevator  Stairs to Enter : 0             Lives With: Alone  Drives: Yes  Patient Owned Equipment: None  Patient Regularly Uses: Glasses    Prior Level of [place]: Pt typically indep with ADLs and mobility. Does her own grocery shopping, laundry, bills, medication mgmt. Has a walk in shower with grab bars outside of the shower. Taller toilet. Dtr lives locally.     SUBJECTIVE  \"I feel okay\"       OBJECTIVE  Precautions: Bed/chair alarm  Fall Risk: Standard fall risk    WEIGHT BEARING RESTRICTION  Weight Bearing Restriction: None                PAIN ASSESSMENT  Ratin  Location: headac  Management Techniques: Activity  promotion    COGNITION  Overall Cognitive Status:  WFL - within functional limits    RANGE OF MOTION AND STRENGTH ASSESSMENT  Upper extremity ROM and strength are within functional limits     Lower extremity ROM is within functional limits     Lower extremity strength is within functional limits       BALANCE  Static Sitting: Fair -  Dynamic Sitting: Fair -  Static Standing: Fair -  Dynamic Standing: Poor +    ADDITIONAL TESTS  Additional Tests: Modified Jeremy              Modified [place]: 3                  ACTIVITY TOLERANCE  Pulse: 77  Heart Rate Source: Monitor                   O2 WALK  Oxygen Therapy  SPO2% on Room Air at Rest: 97    NEUROLOGICAL FINDINGS  Neurological Findings: Coordination - Finger to Nose;Coordination - Heel to Shin;Coordination - Rapid Alternating Movement;Sensation  Coordination - Finger to Nose: Symmetrical  Coordination - Heel to Shin: Symmetrical  Coordination - Rapid Alternating Movement: Symmetrical  Sensation: reports \"tingly tongue\" for the past 4-5 months         AM-PAC '6-Clicks' INPATIENT SHORT FORM - BASIC MOBILITY  How much difficulty does the patient currently have...  Patient Difficulty: Turning over in bed (including adjusting bedclothes, sheets and blankets)?: None   Patient Difficulty: Sitting down on and standing up from a chair with arms (e.g., wheelchair, bedside commode, etc.): None   Patient Difficulty: Moving from lying on back to sitting on the side of the bed?: None   How much help from another person does the patient currently need...   Help from Another: Moving to and from a bed to a chair (including a wheelchair)?: None   Help from Another: Need to walk in hospital room?: A Little   Help from Another: Climbing 3-5 steps with a railing?: A Little       AM-PAC Score:  Raw Score: 22   Approx Degree of Impairment: 20.91%   Standardized Score (AM-PAC Scale): 53.28   CMS Modifier (G-Code): CJ    FUNCTIONAL ABILITY STATUS  Gait Assessment   Functional Mobility/Gait  Assessment  Gait Assistance: Supervision;Modified independent  Distance (ft): 200  Assistive Device: Rolling walker  Pattern: Within Functional Limits (dec speed)    Skilled Therapy Provided     Bed Mobility:  Rolling: NT  Supine to sit: NT   Sit to supine: ind     Transfer Mobility:  Sit to stand: ind   Stand to sit: ind  Gait = supervision-mod I     Therapist's comments:RN cleared for session. Pt agreeable for therapy, received up in chair. Pt overall indep with transfers and supervision with RW. Pt agreeable for continued ambulation with RW, one issued from therapy dept. Pt agreeable to OP PT to work on higher level balance. Instructed to call for nursing staff for any needs and OOB mobility.       Exercise/Education Provided:  Bed mobility  Body mechanics  Energy conservation  Functional activity tolerated  Gait training  Posture  Strengthening  Transfer training    Patient End of Session: In bed;Needs met;Call light within reach;RN aware of session/findings;All patient questions and concerns addressed;Alarm set;Discussed recommendations with /    Patient Evaluation Complexity Level:  History High - 3 or more personal factors and/or co-morbidities   Examination of body systems Low - addressing 1-2 elements   Clinical Presentation Low - Stable   Clinical Decision Making Low Complexity       PT Session Time: 30 minutes  Gait Training: 15 minutes  Therapeutic Activity: 5 minutes

## 2024-07-03 NOTE — ED PROVIDER NOTES
Patient Seen in: [facility] Emergency Department      History     Chief Complaint   Patient presents with    Stroke     Stated Complaint: Stroke    Subjective:   HPI    Patient is a 88-year-old female who is not able to provide significant history.  History obtained primarily from EMS as well as daughter.  Patient called her daughter around 7:10 PM stating she was not feeling well for the last few hours.  Patient was unable to get give much more information and eventually hung up and had a life alert and EMS was called.  Patient was initially talking with paramedics but became less conversant.  Patient did complain of a headache earlier.  Patient unable to provide history other than her name.  Patient does move all extremities.  I did call the daughter who states she was slightly confused when she called her and unknown last time of well.  Remainder of review of systems negative.    Objective:   Past Medical History:    Anxiety    Arthritis    Back, hips, hands    Back injury    Bone spur of other site    R shoulder    Essential hypertension    Infection of tooth    Stroke (HCC)    Thyroid disease    TIA (transient ischemic attack)    6/23/19              Past Surgical History:   Procedure Laterality Date    Hysterectomy  1977    partial    Skin surgery  11/10/2021    BCC left sandy of jaw mohs by Dr Victor                Social History     Socioeconomic History    Marital status:    Tobacco Use    Smoking status: Never    Smokeless tobacco: Never   Vaping Use    Vaping status: Never Used   Substance and Sexual Activity    Alcohol use: Never    Drug use: Never   Other Topics Concern    Caffeine Concern Yes     Comment: ice tea with caffeine occasionally; dark chocolate sometimes    Exercise Yes     Comment: little              Review of Systems    Positive for stated Chief Complaint: Stroke    Other systems are as noted in HPI.  Constitutional and vital signs reviewed.      All other systems reviewed  and negative except as noted above.    Physical Exam     ED Triage Vitals   BP 07/02/24 2031 (!) 167/83   Pulse 07/02/24 2031 86   Resp 07/02/24 2031 20   Temp 07/02/24 2031 98.8 °F (37.1 °C)   Temp src 07/03/24 0215 Oral   SpO2 07/02/24 2031 96 %   O2 Device 07/02/24 2031 None (Room air)       Current Vitals:   Vital Signs  BP: 132/56  Pulse: 72  Resp: 18  Temp: 98.7 °F (37.1 °C)  Temp src: Oral  MAP (mmHg): 78    Oxygen Therapy  SpO2: 94 %  O2 Device: None (Room air)  Pulse Oximetry Type: Continuous  Oximetry Probe Site Changed: No  Pulse Ox Probe Location: Left hand            Physical Exam  GENERAL: Patient resting on the cart in no acute distress.  HEENT: Extraocular muscles intact, pupils equal round reactive to light and accommodation.  Mouth normal, neck supple, no meningismus.  LUNGS: Lungs clear to auscultation bilaterally.  CARDIOVASCULAR: + S1-S2, regular rate and rhythm, no murmurs.  BACK: No CVA tenderness, no midline bony tenderness.  ABDOMEN: + Bowel sounds, soft, nontender, nondistended.  No rebound, no guarding, no hepatosplenomegaly.  EXTREMITIES: Full range of motion, no tenderness, good capillary refill.  SKIN: No rash, good turgor.  NEURO: Patient answers her name.  Patient unable to name the date or provide further information.  Patient does move all extremities.  Follows commands.  5 out of 5 strength upper and lower extremities.  Sensation tact light touch.  No facial droop.  Tongue midline.  Hearing intact.  Normal shoulder shrug.         ED Course     Labs Reviewed   COMP METABOLIC PANEL (14) - Abnormal; Notable for the following components:       Result Value    Glucose 202 (*)     Creatinine 1.18 (*)     eGFR-Cr 44 (*)     All other components within normal limits   URINALYSIS, ROUTINE - Abnormal; Notable for the following components:    Urine Color Colorless (*)     All other components within normal limits   LIPID PANEL - Abnormal; Notable for the following components:    Cholesterol,  Total 243 (*)     LDL Cholesterol 166 (*)     Non HDL Chol 185 (*)     All other components within normal limits   COMP METABOLIC PANEL (14) - Abnormal; Notable for the following components:    Glucose 113 (*)     Albumin 3.2 (*)     All other components within normal limits   POCT GLUCOSE - Abnormal; Notable for the following components:    POC Glucose 187 (*)     All other components within normal limits   POCT GLUCOSE - Abnormal; Notable for the following components:    POC Glucose 129 (*)     All other components within normal limits   POCT GLUCOSE - Abnormal; Notable for the following components:    POC Glucose 124 (*)     All other components within normal limits   POCT GLUCOSE - Abnormal; Notable for the following components:    POC Glucose 107 (*)     All other components within normal limits   PROTHROMBIN TIME (PT) - Normal   PTT, ACTIVATED - Normal   MAGNESIUM - Normal   PHOSPHORUS - Normal   CBC WITH DIFFERENTIAL WITH PLATELET    Narrative:     The following orders were created for panel order CBC With Differential With Platelet.  Procedure                               Abnormality         Status                     ---------                               -----------         ------                     CBC W/ DIFFERENTIAL[[id number]]                              Final result                 Please view results for these tests on the individual orders.   HEMOGLOBIN A1C   TYPE AND SCREEN    Narrative:     The following orders were created for panel order Type and screen.  Procedure                               Abnormality         Status                     ---------                               -----------         ------                     ABORH (Blood Type)[[id number]]                               Final result               Antibody Screen[[id number]]                                  Final result                 Please view results for these tests on the individual orders.   ABORH (BLOOD TYPE)   ANTIBODY  SCREEN   ABORH CONFIRMATION   RAINBOW DRAW LAVENDER   RAINBOW DRAW LIGHT GREEN   RAINBOW DRAW BLUE   RAINBOW DRAW GOLD   CBC W/ DIFFERENTIAL     EKG    Rate, intervals and axes as noted on EKG Report.  Rate: 88  Rhythm: Sinus Rhythm  Reading: Normal sinus rhythm, nonspecific ST changes       CTA head and neck1. No acute intracranial process.      2. CTA of the brain demonstrates no evidence of high-grade stenosis or vessel occlusion.       3. CTA of the neck demonstrates approximately 50% stenosis of the right internal carotid artery.       4.  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     CT brain No acute intracranial process.   Chest x-rayNo evidence of active cardiopulmonary disease.   Independent reviewed by myself, no pneumothorax    MRI brain  TNK/ NI Documentation:    Date/Time last known well: Unknown  N/A    NIHSS on presentation: N/A     Chief Complaint   Patient presents with    Stroke     IV Tenecteplase (TNK) administered: No; Patient is not a Candidate for IV TNK due to: DOAC- patient on DOAC and took a dose less than 48 hours    Candidate for Endovascular thrombectomy (EVT): No; Patient is not a candidate for Endovascular Thrombectomy due to: No large vessel occlusion ( LVO)  on CTA/MRA imaging      Disposition: There is no disposition on file for this visit.                  MDM      Patient was not a candidate for TNK given she is on a DOAC.  Patient also has unknown last known well time.  I did speak with stroke neurology as well as neurointerventional list.  Patient symptoms significantly improved while she was in the emergency department.  Patient provided more history name the president, and 2+2, 3+4.  No pronator drift.  No finger-nose intact bilaterally.  Patient was feeling very anxious.  Family did not feel comfortable taking her home.  [facility]  Patient be admitted for further evaluation.  I did speak with  neurologist who did request MRI from the emergency department.  This to be signed out after emergency physician.    I did consider stroke, TIA, anxiety.    A total of 50 minutes of critical care time (exclusive of billable procedures) was administered to manage the patient's neurologic instability due to her TIA versus anxiety..  This involved direct patient intervention, complex decision making, and/or extensive discussions with the patient, family, and clinical staff.    Admission disposition: 7/3/2024  4:00 PM                                        Medical Decision Making      Disposition and Plan     Clinical Impression:  1. Transient alteration of awareness    2. Poor balance         Disposition:  Admit  7/3/2024  4:00 pm    Follow-up:  David Canchola MD  [address] [place] [id number]  [phone]    Follow up in 1 week(s)            Medications Prescribed:  Current Discharge Medication List                            Hospital Problems       Present on Admission  Date Reviewed: 6/13/2024            ICD-10-CM Noted POA    * (Principal) Transient alteration of awareness R40.4 7/2/2024 Unknown    Essential hypertension I10 1/20/2019 Yes    Hyperglycemia R73.9 7/2/2024 Yes    Hypothyroidism E03.9 6/24/2019 Yes    Mixed hyperlipidemia E78.2 9/16/2019 Yes    PAF (paroxysmal atrial fibrillation) (HCC) I48.0 9/16/2019 Yes

## 2024-07-03 NOTE — H&P
[facility]     Bella Kirby Patient Status:  Emergency    1935 MRN [id number]   Location [facility] EMERGENCY DEPARTMENT Attending Rasta Morris MD   Hosp Day # 0 PCP David Canchola MD     Chief Complaint: aphasia    Subjective:    History of Present Illness:     Bella Kirby is a 88 year old female with history of stroke, hypertension, hyperlipidemia, hypothyroidism, paroxysmal atrial fibrillation presents to the ER with confusion and expressive aphasia. Last known normal was at 1900.  No headache no visual changes.  No numbness or tingling in the face, upper or lower extremities.  No fevers, chills, nausea, vomiting, diarrhea or constipation.    History/Other:    Past Medical History:  Past Medical History:    Anxiety    Back injury    Essential hypertension    Infection of tooth    Stroke (HCC)    Thyroid disease    TIA (transient ischemic attack)    19     Past Surgical History:   Past Surgical History:   Procedure Laterality Date    Hysterectomy      partial    Skin surgery  11/10/2021    BCC left sandy of jaw mohs by Dr Victor      Family History:   Family History   Problem Relation Age of Onset    Stroke Mother     Heart Disorder Father     Other (Down's Syndrome) Sister     Other (hypotension) Brother 84        orthostatsis due to Parkinson's    Other (hypoglycemia) Brother 81    Other (Parkinson's disease) Brother     Hyperlipidemia Brother     Hyperlipidemia Brother     Other (malabsorption) Daughter     Hyperlipidemia Daughter     Other (cardiac shunt) Daughter         repaired age 17 surgically    Hyperlipidemia Daughter     Other (osteoarthritis) Daughter         hip     Social History:    reports that she has never smoked. She has never used smokeless tobacco. She reports that she does not drink alcohol and does not use drugs.     Allergies:   Allergies   Allergen Reactions    Acyclovir OTHER (SEE COMMENTS)    Amoxicillin     Atorvastatin MYALGIA and  OTHER (SEE COMMENTS)    Azithromycin OTHER (SEE COMMENTS)    Brinzolamide OTHER (SEE COMMENTS)    Brompheniramine-Phenylephrine OTHER (SEE COMMENTS)    Cefaclor ANAPHYLAXIS     Headache    Headache  Headache    Cephalexin OTHER (SEE COMMENTS)    Cinoxacin OTHER (SEE COMMENTS)    Clarithromycin OTHER (SEE COMMENTS)    Cyclobenzaprine OTHER (SEE COMMENTS)    Darvon [Propoxyphene]     Diphenoxylate-Atropine OTHER (SEE COMMENTS)    Doxycycline OTHER (SEE COMMENTS)    Epinephrine OTHER (SEE COMMENTS)    Ibuprofen OTHER (SEE COMMENTS)    Latex OTHER (SEE COMMENTS)    Loperamide OTHER (SEE COMMENTS)    Loracarbef OTHER (SEE COMMENTS)    Mayonaise     Methylprednisolone OTHER (SEE COMMENTS)    Metronidazole OTHER (SEE COMMENTS)     Flagyl  Flagyl  Flagyl    Miconazole OTHER (SEE COMMENTS)    Nitrofurantoin OTHER (SEE COMMENTS)    Olive Oil OTHER (SEE COMMENTS)    Paxil [Paroxetine]     Pyridin [Phenazopyridine]     Rosuvastatin OTHER (SEE COMMENTS)     Agitation   Agitation   Agitation     Sulfamethoxazole      Throat swelling    Synalgos Dc     Donnatal [Belladonna Alk-Phenobarbital]     Gantrisin [Sulfisoxazole]     Smoke OTHER (SEE COMMENTS)     Eyes water and cough when she is around smoke     Sudafed [Pseudoephedrine]     Trazodone     Ampicillin     Bactrim [Sulfamethoxazole W/Trimethoprim]     Dimetapp Cold-Allergy [Brompheniramine-Phenylephrine]     Effexor [Venlafaxine]      Headache      Mycostatin [Nystatin]     Radiology Contrast Iodinated Dyes RASH     MRI contrast per patient       Medications:    No current facility-administered medications on file prior to encounter.     Current Outpatient Medications on File Prior to Encounter   Medication Sig Dispense Refill    metoprolol succinate ER 25 MG Oral Tablet 24 Hr Take 1 tablet (25 mg total) by mouth in the morning and 1 tablet (25 mg total) before bedtime.      ampicillin 500 MG Oral Cap Take 1 capsule (500 mg total) by mouth 4 (four) times daily. 28 capsule 0     escitalopram 10 MG Oral Tab Take 1 tablet (10 mg total) by mouth daily. 90 tablet 0    LEVOTHYROXINE 50 MCG Oral Tab TAKE ONE TABLET BY MOUTH ONCE DAILY BEFORE BREAKFAST 90 tablet 0    LORazepam 0.5 MG Oral Tab Take 1 tablet (0.5 mg total) by mouth as needed for Anxiety (Take 30 minutes before mri, do not drive after taking). 1 tablet 0    ondansetron 4 MG Oral Tablet Dispersible Take 1 tablet (4 mg total) by mouth every 8 (eight) hours as needed for Nausea. (Patient not taking: Reported on 3/6/2024) 20 tablet 0    ampicillin 500 MG Oral Cap  (Patient not taking: Reported on 3/6/2024)      triamcinolone 0.1 % External Cream       hydrocortisone 2.5 % External Cream Apply thin layer to affected areas twice daily x 2 weeks on and 2 weeks off, then restart 30 g 2    Rivaroxaban (XARELTO) 20 MG Oral Tab Take 1 tablet (20 mg total) by mouth daily. (Patient taking differently: Take 1 tablet (20 mg total) by mouth daily. Takes nightly also) 90 tablet 3    melatonin 3 MG Oral Tab Take 0.5 tablets (1.5 mg total) by mouth nightly. (Patient not taking: Reported on 3/6/2024)      Probiotic Product (PROBIOTIC DAILY OR) Take by mouth daily.      Multiple Vitamin (MULTI-VITAMIN DAILY) Oral Tab Take by mouth daily.        Misc Natural Products (LUTEIN 20 OR) Take by mouth daily.        Ascorbic Acid (SUPER C COMPLEX OR) Take by mouth daily.           Review of Systems:   A comprehensive review of systems was completed.    Pertinent positives and negatives noted in the HPI.    Objective:   Physical Exam:    BP (!) 167/83   Pulse 86   Temp 98.8 °F (37.1 °C)   Resp 20   Wt 151 lb 1.6 oz (68.5 kg)   SpO2 96%   BMI 27.64 kg/m²   General: No acute distress, Alert  Respiratory: No rhonchi, no wheezes  Cardiovascular: S1, S2. Regular rate and rhythm  Abdomen: Soft, Non-tender, non-distended, positive bowel sounds  Neuro: No new focal deficits  Extremities: No edema      Results:    Labs:      Labs Last 24 Hours:    Recent Labs    Lab 07/02/24 1957   RBC 4.32   HGB 12.8   HCT 35.3   MCV 81.7   MCH 29.6   MCHC 36.3   RDW 12.6   NEPRELIM 3.09   WBC 5.4   .0       Recent Labs   Lab 07/02/24 1957   *   BUN 16   CREATSERUM 1.18*   EGFRCR 44*   CA 9.6   ALB 3.6      K 3.7      CO2 23.0   ALKPHO 84   AST 20   ALT 26   BILT 0.4   TP 7.0       Lab Results   Component Value Date    INR 0.96 07/02/2024    INR 0.98 01/20/2019    INR 0.90 01/20/2019       No results for input(s): \"TROP\", \"TROPHS\", \"CK\" in the last 168 hours.    No results for input(s): \"TROP\", \"PBNP\" in the last 168 hours.    No results for input(s): \"PCT\" in the last 168 hours.    Imaging: Imaging data reviewed in Epic.    Assessment & Plan:      # Aphasia  - Now resolved  - TIA vs Stroke  - CT brain negative  - MRI ordered  - Neurology on consult  - Continue stroke protocol  - continue ASA  - pt has myalgias to statins    # Hypertension  - Will continue on metoprolol.    # Paroxysmal atrial fibrillation  - will continue on xarelto for anticaogulation.  - Will continue on metoprolol for rate control.    # Hypothyroidism  -Continue on levothyroxine.    # Hyperlipidemia  - will continue on statin therapy.      Plan of care discussed with patient at bedside.    Felix Caballero, DO    Supplementary Documentation:     The 21st Century Cures Act makes medical notes like these available to patients in the interest of transparency. Please be advised this is a medical document. Medical documents are intended to carry relevant information, facts as evident, and the clinical opinion of the practitioner. The medical note is intended as peer to peer communication and may appear blunt or direct. It is written in medical language and may contain abbreviations or verbiage that are unfamiliar.

## 2024-07-03 NOTE — ED INITIAL ASSESSMENT (HPI)
Pt to ED for stroke like symptoms. Pt has Hx of CVA in past. LKW was 1910 per daughter. Pt had symptoms of confusion, expressive asphasia, per daughter.

## 2024-07-03 NOTE — CM/SW NOTE
07/03/24 1400   CM/SW Referral Data   Referral Source Physician   Reason for Referral Discharge planning   Informant Patient   Patient Info   Patient's Current Mental Status at Time of Assessment Alert;Oriented   Patient's Home Environment Condo/Apt with elevator   Patient lives with Alone   Patient Status Prior to Admission   Independent with ADLs and Mobility Yes   Discharge Needs   Anticipated D/C needs To be determined   Choice of Post-Acute Provider   Informed patient of right to choose their preferred provider Yes     Order received for HH eval  Pt is a 88 year old female admitted with confusion and expressive aphasia.    Met w/pt for eval and she reports she lives alone in a condo w/elevator and is independent with all ADLs/IADLs, drives.  She has a daughter that lives nearby and is supportive.    PT/OT pending.  Pt reports she feels like she is back to baseline, but feels very fatigued.    / to remain available for support and/or discharge planning.     Valeria SCOTTA MSN, RN CTL/  [id number]

## 2024-07-03 NOTE — PROGRESS NOTES
[facility]   part of [facility]     Hospitalist Progress Note     Bella Kirby Patient Status:  Inpatient    1935 MRN [id number]   Location [facility] 7NE-A Attending Jaciel Elias, DO   Hosp Day # 0 PCP David Canchola MD     Chief Complaint: Aphasia    Subjective:     Patient seen in bed and reports improvement in aphasia    Objective:    Review of Systems:   A comprehensive review of systems was completed; pertinent positive and negatives stated in subjective.    Vital signs:  Temp:  [98.4 °F (36.9 °C)-98.9 °F (37.2 °C)] 98.4 °F (36.9 °C)  Pulse:  [74-92] 74  Resp:  [13-21] 16  BP: (129-170)/(58-86) 135/62  SpO2:  [93 %-97 %] 93 %    Physical Exam:    General: No acute distress  Respiratory: No wheezes, no rhonchi  Cardiovascular: S1, S2, regular rate and rhythm  Abdomen: Soft, Non-tender, non-distended, positive bowel sounds  Neuro: No new focal deficits.   Extremities: No edema    Diagnostic Data:    Labs:  Recent Labs   Lab 24   WBC 5.4   HGB 12.8   MCV 81.7   .0   INR 0.96       Recent Labs   Lab 24  0610   * 113*   BUN 16 13   CREATSERUM 1.18* 0.72   CA 9.6 8.8   ALB 3.6 3.2*    139   K 3.7 3.9    109   CO2 23.0 24.0   ALKPHO 84 66   AST 20 15   ALT 26 21   BILT 0.4 0.3   TP 7.0 6.4       Estimated Creatinine Clearance: 42.7 mL/min (based on SCr of 0.72 mg/dL).    No results for input(s): \"TROP\", \"TROPHS\", \"CK\" in the last 168 hours.    Recent Labs   Lab 24   PTP 12.8   INR 0.96                  Microbiology    No results found for this visit on 24.      Imaging: Reviewed in Epic.    Medications:    escitalopram  10 mg Oral Daily    levothyroxine  50 mcg Oral Before breakfast    metoprolol succinate ER  25 mg Oral 2x Daily(Beta Blocker)    rivaroxaban  20 mg Oral Nightly    [START ON 2024] aspirin  81 mg Oral Daily    ezetimibe  10 mg Oral Nightly    sodium phosphate  15 mmol Intravenous Once        Assessment & Plan:      #Aphasia, resolved  #Possible TIA  -CT brain negative  -MRI brain negative  -PT OT consulted  -Neurology consulted    #Hypertension  -Metoprolol    #Paroxysmal A-fib  -Xarelto, metoprolol    #Hypothyroidism  -Levothyroxine    #Hyperlipidemia  -Statin    Jaciel Elias,     Supplementary Documentation:     Quality:  DVT Mechanical Prophylaxis:   SCDs,    DVT Pharmacologic Prophylaxis   Medication    rivaroxaban (Xarelto) tab 20 mg      DVT Pharmacologic prophylaxis: Aspirin 162 mg         Code Status: Full Code  Claudio: No urinary catheter in place  Claudio Duration (in days):   Central line:    YOSEPH:     Discharge is dependent on: Clinical improvement  At this point Ms. Kirby is expected to be discharge to: Home    The 21st Century Cures Act makes medical notes like these available to patients in the interest of transparency. Please be advised this is a medical document. Medical documents are intended to carry relevant information, facts as evident, and the clinical opinion of the practitioner. The medical note is intended as peer to peer communication and may appear blunt or direct. It is written in medical language and may contain abbreviations or verbiage that are unfamiliar.             **Certification      PHYSICIAN Certification of Need for Inpatient Hospitalization - Initial Certification    Patient will require inpatient services that will reasonably be expected to span two midnight's based on the clinical documentation in H+P.   Based on patients current state of illness, I anticipate that, after discharge, patient will require TBD.

## 2024-07-03 NOTE — SLP NOTE
ADULT SWALLOWING EVALUATION    ASSESSMENT    ASSESSMENT/OVERALL IMPRESSION:  Patient is an 88 year old female with pmhx of stroke, HTN, HLD, Afib. Presented to the ED with confusion and aphasia. Symptoms have resolved. Patient reports that she has had episode of choking in past week and at times it is harder for her to swallow solids and dry items.   Oral motor mechanism exam revealed functional oral range, rate, and strength of oral musculature, intact dentition, and clear vocal quality. Strong cough on command.    Assessed patient with thin liquids via spoon, cup, and straw, puree, and solids.   Oral phase revealed functional oral acceptance, retrieval and mastication of solids with timely and complete clearing of the oral cavity.   Pharyngeal phase revealed an apparent timely initiation of the pharyngeal phase with functional hyolaryngeal elevation on palpation. No coughing or throat clearing. Patient presents with functional oral phase and apparent functional pharyngeal phase of swallow free of overt clinical s/s of aspiration.   Reviewed with patient safe aspiration precautions/compensatory strategies with patient which patient able to return demonstration.   Given patient's report of swallowing difficulty, will follow up to ensure safety with diet and educate pt on compensatory strategies/swallow precautions.     Santiago Assessment of Swallow Function Score: No abnormality detected    RECOMMENDATIONS   Diet Recommendations - Solids: Regular  Diet Recommendations - Liquids: Thin Liquids       Compensatory Strategies Recommended: Extra sauce/gravy;Slow rate  Aspiration Precautions: Upright position;Slow rate  Medication Administration Recommendations: No restrictions  Treatment Plan/Recommendations: Aspiration precautions (meal monitor)    HISTORY   MEDICAL HISTORY  Reason for Referral: Stroke protocol    Problem List  Principal Problem:    Transient alteration of awareness  Active Problems:    Essential  hypertension    Mixed hyperlipidemia    PAF (paroxysmal atrial fibrillation) (HCC)    Hypothyroidism    Hyperglycemia      Past Medical History  Past Medical History:    Anxiety    Arthritis    Back, hips, hands    Back injury    Bone spur of other site    R shoulder    Essential hypertension    Infection of tooth    Stroke (HCC)    Thyroid disease    TIA (transient ischemic attack)    6/23/19       Prior Living Situation: Home alone  Diet Prior to Admission: Regular;Thin liquids  Precautions: Aspiration    Patient/Family Goals: To eat and drink    SWALLOWING HISTORY  Current Diet Consistency: Regular;Thin liquids  Dysphagia History: No past history reported.   Imaging Results:   CONCLUSION:  No acute intracranial process.    SUBJECTIVE       OBJECTIVE   ORAL MOTOR EXAMINATION  Dentition: Natural;Functional (missing some molars)  Symmetry: Within Functional Limits  Strength: Within Functional Limits  Tone: Within Functional Limits  Range of Motion: Within Functional Limits  Rate of Motion: Within Functional Limits    Voice Quality: Clear  Respiratory Status: Unlabored  Consistencies Trialed: Thin liquids;Puree;Hard solid  Method of Presentation: Self presentation  Patient Positioning: Upright;Midline    Oral Phase of Swallow: Within Functional Limits                      Pharyngeal Phase of Swallow: Within Functional Limits           (Please note: Silent aspiration cannot be evaluated clinically. Videofluoroscopic Swallow Study is required to rule-out silent aspiration.)    Esophageal Phase of Swallow: No complaints consistent with possible esophageal involvement  Comments:               GOALS  Goal #1 The patient will tolerate regular consistency and thin liquids without overt signs or symptoms of aspiration with 95 % accuracy over 1-2 session(s).  In Progress   Goal #2 The patient/family/caregiver will demonstrate understanding and implementation of aspiration precautions and swallow strategies independently over  1-2 session(s).    In Progress   Goal #3 The patient will utilize compensatory strategies as outlined by  BSSE (clinical evaluation) including Slow rate, Small bites, Small sips with min assistance 95 % of the time across 2 sessions.  In Progress     FOLLOW UP  Treatment Plan/Recommendations: Aspiration precautions (meal monitor)  Number of Visits to Meet Established Goals: 1  Follow Up Needed (Documentation Required): Yes  SLP Follow-up Date: 07/05/24    Thank you for your referral.   If you have any questions, please contact CORI Valenzuela

## 2024-07-04 VITALS
RESPIRATION RATE: 16 BRPM | BODY MASS INDEX: 28 KG/M2 | HEART RATE: 70 BPM | DIASTOLIC BLOOD PRESSURE: 74 MMHG | WEIGHT: 151.13 LBS | SYSTOLIC BLOOD PRESSURE: 147 MMHG | OXYGEN SATURATION: 98 % | TEMPERATURE: 98 F

## 2024-07-04 LAB
EST. AVERAGE GLUCOSE BLD GHB EST-MCNC: 126 MG/DL (ref 68–126)
GLUCOSE BLD-MCNC: 103 MG/DL (ref 70–99)
GLUCOSE BLD-MCNC: 106 MG/DL (ref 70–99)
HBA1C MFR BLD: 6 % (ref ?–5.7)
PHOSPHATE SERPL-MCNC: 2.7 MG/DL (ref 2.5–4.9)

## 2024-07-04 PROCEDURE — 99232 SBSQ HOSP IP/OBS MODERATE 35: CPT | Performed by: OTHER

## 2024-07-04 PROCEDURE — 95819 EEG AWAKE AND ASLEEP: CPT | Performed by: OTHER

## 2024-07-04 PROCEDURE — 99239 HOSP IP/OBS DSCHRG MGMT >30: CPT | Performed by: STUDENT IN AN ORGANIZED HEALTH CARE EDUCATION/TRAINING PROGRAM

## 2024-07-04 RX ORDER — ASPIRIN 81 MG/1
81 TABLET, CHEWABLE ORAL DAILY
Qty: 30 TABLET | Refills: 0 | Status: SHIPPED | OUTPATIENT
Start: 2024-07-05 | End: 2024-07-08

## 2024-07-04 RX ORDER — EZETIMIBE 10 MG/1
10 TABLET ORAL NIGHTLY
Qty: 30 TABLET | Refills: 0 | Status: SHIPPED | OUTPATIENT
Start: 2024-07-04 | End: 2024-07-08

## 2024-07-04 NOTE — DISCHARGE SUMMARY
[place] [facility]PITAL[facility]  DISCHARGE SUMMARY     Bella Kirby Patient Status:  Inpatient    1935 MRN [id number]   Location [facility] 7NE-A Attending No att. providers found   Hosp Day # 1 PCP David Canchola MD     Date of Admission: 2024  Date of Discharge:  2024     Discharge Disposition: Home or Self Care    Discharge Diagnosis:  #Aphasia, resolved  #Possible TIA  #HTN  #Paroxysmal Afib  #Hypothyroidism  #HLD    History of Present Illness: Bella Kirby is a 88 year old female with history of stroke, hypertension, hyperlipidemia, hypothyroidism, paroxysmal atrial fibrillation presents to the ER with confusion and expressive aphasia. Last known normal was at 1900.  No headache no visual changes.  No numbness or tingling in the face, upper or lower extremities.  No fevers, chills, nausea, vomiting, diarrhea or constipation.        Brief Synopsis: CT brain was negative. MRI brain was negative. EEG was normal. Neuro was consulted. PT OT recommend discharge home with home health. Patient was discharged home with outpatient follow up.    Lace+ Score: 72  59-90 High Risk  29-58 Medium Risk  0-28   Low Risk       TCM Follow-Up Recommendation:  LACE > 58: High Risk of readmission after discharge from the hospital.  **Certification    Admission date was 2024.  Inpatient stay was shorter than expected.  Patient's Transient alteration of awareness was initially serious enough to expect a more lengthy hospitalization but patient improved faster than expected.                 Procedures during hospitalization:   None    Incidental or significant findings and recommendations (brief descriptions):  See brief synopsis above     Lab/Test results pending at Discharge:   None     Consultants:  Neuro     Discharge Medication List:     Discharge Medications        START taking these medications        Instructions Prescription details   aspirin 81 MG Chew      Chew 1 tablet (81 mg total) by mouth daily.   Stop  taking on: August 4, 2024  Quantity: 30 tablet  Refills: 0     ezetimibe 10 MG Tabs  Commonly known as: Zetia      Take 1 tablet (10 mg total) by mouth nightly.   Stop taking on: August 3, 2024  Quantity: 30 tablet  Refills: 0            CONTINUE taking these medications        Instructions Prescription details   escitalopram 10 MG Tabs  Commonly known as: Lexapro      Take 1 tablet (10 mg total) by mouth daily.   Quantity: 90 tablet  Refills: 0     hydrocortisone 2.5 % Crea      Apply thin layer to affected areas twice daily x 2 weeks on and 2 weeks off, then restart   Quantity: 30 g  Refills: 2     levothyroxine 50 MCG Tabs  Commonly known as: Synthroid      TAKE ONE TABLET BY MOUTH ONCE DAILY BEFORE BREAKFAST   Quantity: 90 tablet  Refills: 0     LORazepam 0.5 MG Tabs  Commonly known as: Ativan      Take 1 tablet (0.5 mg total) by mouth as needed for Anxiety (Take 30 minutes before mri, do not drive after taking).   Quantity: 1 tablet  Refills: 0     LUTEIN 20 OR      Take by mouth daily.   Refills: 0     metoprolol succinate ER 25 MG Tb24  Commonly known as: Toprol XL      Take 1 tablet (25 mg total) by mouth in the morning and 1 tablet (25 mg total) before bedtime.   Refills: 0     Multi-Vitamin Daily Tabs      Take by mouth daily.   Refills: 0     PROBIOTIC DAILY OR      Take by mouth daily.   Refills: 0     rivaroxaban 20 MG Tabs  Commonly known as: Xarelto      Take 1 tablet (20 mg total) by mouth daily.   Quantity: 90 tablet  Refills: 3     SUPER C COMPLEX OR      Take by mouth daily.   Refills: 0     triamcinolone 0.1 % Crea  Commonly known as: Kenalog       Refills: 0            STOP taking these medications      ampicillin 500 MG Caps  Commonly known as: Principen        melatonin 3 MG Tabs        ondansetron 4 MG Tbdp  Commonly known as: Zofran-ODT                  Where to Get Your Medications        These medications were sent to [id number] - [place], [place] - [address]  [phone],  [phone]  [address] [facility], [facility] [address]      Hours: 24-hours Phone: [phone]   aspirin 81 MG Chew  ezetimibe 10 MG Tabs         [place] reviewed[place]     Follow-up appointment:   David Canchola MD   [address]  [facility] [address]  [phone]    Follow up in 1 week(s)      Austin Avalos DO  [address]  [address]  [facility] [id number] [phone]    Call in 1 day(s)  Follow up with Neurology Clinic in 4-6 weeks    Appointments for Next 30 Days 2024 - 2024      None            Vital signs:       Physical Exam:    General: No acute distress   Lungs: clear to auscultation  Cardiovascular: S1, S2  Abdomen: Soft    -----------------------------------------------------------------------------------------------  PATIENT DISCHARGE INSTRUCTIONS: See electronic chart    Jaciel Elias DO    Total time spent on discharge plannin minutes     The  Century Cures Act makes medical notes like these available to patients in the interest of transparency. Please be advised this is a medical document. Medical documents are intended to carry relevant information, facts as evident, and the clinical opinion of the practitioner. The medical note is intended as peer to peer communication and may appear blunt or direct. It is written in medical language and may contain abbreviations or verbiage that are unfamiliar.

## 2024-07-04 NOTE — PROGRESS NOTES
[facility]   part of [facility]     Hospitalist Progress Note     Bella Kirby Patient Status:  Inpatient    1935 MRN [id number]   Location [facility] 7NE-A Attending Jaciel Elias,    Hosp Day # 1 PCP David Canchola MD     Chief Complaint: Aphasia    Subjective:     Patient seen in bed and has no complaints    Objective:    Review of Systems:   A comprehensive review of systems was completed; pertinent positive and negatives stated in subjective.    Vital signs:  Temp:  [97 °F (36.1 °C)-98.9 °F (37.2 °C)] 97.5 °F (36.4 °C)  Pulse:  [71-77] 74  Resp:  [14-18] 14  BP: (127-167)/(60-68) 130/68  SpO2:  [92 %-95 %] 93 %    Physical Exam:    General: No acute distress  Respiratory: No wheezes, no rhonchi  Cardiovascular: S1, S2, regular rate and rhythm  Abdomen: Soft, Non-tender, non-distended, positive bowel sounds  Neuro: No new focal deficits.   Extremities: No edema    Diagnostic Data:    Labs:  Recent Labs   Lab 24   WBC 5.4   HGB 12.8   MCV 81.7   .0   INR 0.96       Recent Labs   Lab 24  0610   * 113*   BUN 16 13   CREATSERUM 1.18* 0.72   CA 9.6 8.8   ALB 3.6 3.2*    139   K 3.7 3.9    109   CO2 23.0 24.0   ALKPHO 84 66   AST 20 15   ALT 26 21   BILT 0.4 0.3   TP 7.0 6.4       Estimated Creatinine Clearance: 42.7 mL/min (based on SCr of 0.72 mg/dL).    No results for input(s): \"TROP\", \"TROPHS\", \"CK\" in the last 168 hours.    Recent Labs   Lab 24   PTP 12.8   INR 0.96                  Microbiology    No results found for this visit on 24.      Imaging: Reviewed in Epic.    Medications:    escitalopram  10 mg Oral Daily    levothyroxine  50 mcg Oral Before breakfast    metoprolol succinate ER  25 mg Oral 2x Daily(Beta Blocker)    rivaroxaban  20 mg Oral Nightly    aspirin  81 mg Oral Daily    ezetimibe  10 mg Oral Nightly       Assessment & Plan:      #Aphasia, resolved  #Possible TIA  -CT brain negative  -MRI brain  negative  -EEG normal  -PT OT following and recommend home with home health  -Neurology following    #Hypertension  -Metoprolol    #Paroxysmal A-fib  -Xarelto, metoprolol    #Hypothyroidism  -Levothyroxine    #Hyperlipidemia  -Statin    DC planning     Jaciel Elias DO    Supplementary Documentation:     Quality:  DVT Mechanical Prophylaxis:   SCDs,    DVT Pharmacologic Prophylaxis   Medication    rivaroxaban (Xarelto) tab 20 mg      DVT Pharmacologic prophylaxis: Aspirin 162 mg         Code Status: Full Code  Claudio: No urinary catheter in place  Claudio Duration (in days):   Central line:    YOSEPH: 7/4/2024    Discharge is dependent on: Clinical improvement  At this point Ms. Kirby is expected to be discharge to: Home    The 21st Century Cures Act makes medical notes like these available to patients in the interest of transparency. Please be advised this is a medical document. Medical documents are intended to carry relevant information, facts as evident, and the clinical opinion of the practitioner. The medical note is intended as peer to peer communication and may appear blunt or direct. It is written in medical language and may contain abbreviations or verbiage that are unfamiliar.             **Certification      PHYSICIAN Certification of Need for Inpatient Hospitalization - Initial Certification    Patient will require inpatient services that will reasonably be expected to span two midnight's based on the clinical documentation in H+P.   Based on patients current state of illness, I anticipate that, after discharge, patient will require TBD.

## 2024-07-04 NOTE — PROCEDURES
[facility] - ELECTROENCEPHALOGRAM (EEG) REPORT  Patient Name:  Bella Kirby   MRN / CSN:  [id number] / [id number]   Date of Birth / Age:  8/21/1935 /  88 year old   Encounter Date:  7/3/24         METHODS:  Twenty-two electrodes were applied according to the 10-20-electrode placement system on this routine audio-video EEG. EKG monitoring, monopolar and bipolar montages are routinely utilized. The record was obtained on a digital system.      OBJECT:  This is a 88 year old year-old female with a PMH of Afib, hypothyroid disease who presents with transient confusion.    The EEG was requested to assess for epileptiform activity and change in mental status.     State(s) of consciousness: awake and drowsy    Relevant medications:    escitalopram  10 mg Oral Daily    levothyroxine  50 mcg Oral Before breakfast    metoprolol succinate ER  25 mg Oral 2x Daily(Beta Blocker)    rivaroxaban  20 mg Oral Nightly    aspirin  81 mg Oral Daily    ezetimibe  10 mg Oral Nightly       FINDINGS:  1) Background: A posterior-dominant background rhythm of 8-9 Hz with an amplitude of 20-40 microvolts is seen.  2) Sleep: No sleep complexes were noted.  3) Abnormalities:  None  4) Activation:                    HV: Not performed.                    IPS:  performed.    IMPRESSION:  This EEG is a normal study recorded in the awake and drowsy states. No focal, lateralizing, or epileptiform activity is seen and no seizures are recorded.     Report covers  Start 7/3/24 at 1553  End 7/3/24 at 1644    SIGNATURES:  Suresh Avalos D.O.  [facility]

## 2024-07-04 NOTE — PROGRESS NOTES
Neurology Progress Note    Bella Kirby Patient Status:  Inpatient    1935 MRN [id number]   [facility] 7NE-A Attending Jaciel Elias,    Hosp Day # 1 PCP David Canchola MD       Chief Complaint:  Transient AMS      Subjective:  Seen sitting in chair at bedside eating lunch today.  She states she is feels well.  She has no more confusion.  Does not have any confusion per chart review since prior to admission.  She states that he feels well and is eager to go home.    Current Medications:  Current Outpatient Medications   Medication Sig Dispense Refill    ezetimibe 10 MG Oral Tab Take 1 tablet (10 mg total) by mouth nightly. 30 tablet 0    [START ON 2024] aspirin 81 MG Oral Chew Tab Chew 1 tablet (81 mg total) by mouth daily. 30 tablet 0       Objective/Physical Exam:    Vital Signs:  Blood pressure 147/74, pulse 70, temperature 97.9 °F (36.6 °C), temperature source Oral, resp. rate 16, weight 151 lb 1.6 oz (68.5 kg), SpO2 98%.    Neurologic: This patient is alert and orientated x 3.  Speech fluent. Able to follow simple commands.  Face is symmetrical.  No Drift.  Pupils equally round and reactive to light.  3+ brisk bilaterally.  EOMs intact.  Visual fields are full.  Tongue is midline.  Uvula and palate elevate symmetrically.  Shrug shoulders normally bilaterally.  The rest of the cranial nerves are grossly intact.  Sensation to light touch is intact bilaterally.  Motor:  No arm or leg weakness noted.  Finger-to-nose coordination is intact.  Gait deferred.    Lungs: Clear to auscultation bilaterally  Cardiac: Regular rate and rhythm. No murmur  GI: non tender on distended      Labs:  Lab Results   Component Value Date    PHOS 2.7 2024    PGLU 106 2024       Imaging:  EEG 7/3/24  This EEG is a normal study recorded in the awake and drowsy states. No focal, lateralizing, or epileptiform activity is seen and no seizures are recorded.     MRI Brain 7/3/24  Impression    CONCLUSION:  No acute intracranial process.      CTA 7/2/24  CONCLUSION:       1. No acute intracranial process.      2. CTA of the brain demonstrates no evidence of high-grade stenosis or vessel occlusion.       3. CTA of the neck demonstrates approximately 50% stenosis of the right internal carotid artery.       4.  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     Assessment:  This is a 88-year-old female with a transient episode of confusion.  Her EEG was unremarkable.  MRI of the brain and CTA of the head and neck did not demonstrate any acute lesions.  No bleeds no strokes.  She is been in her usual state of health since her admission.  She says she feels well today and is eager to go home.  No further neurologic workup needed at this point in time.  Continue aspirin 81 mg daily for TIA prevention.    Plan:  Transient Confusion  - Unclear etiology  - EEG, MRI and CTA were unremarkable  - Continue ASA 81mg PO Qday for TIA prevention    Ok for discharge from neurology    Austin Avalos,   Neurology

## 2024-07-04 NOTE — PLAN OF CARE
Problem: Patient/Family Goals  Goal: Patient/Family Long Term Goal  Description: Patient's Long Term Goal: Discharge home.     Interventions:  - Follow Plan of care.   - See additional Care Plan goals for specific interventions  Outcome: Adequate for Discharge  Goal: Patient/Family Short Term Goal  Description: Patient's Short Term Goal: return to baseline.     Interventions:   - Follow plan of care.   - See additional Care Plan goals for specific interventions  Outcome: Adequate for Discharge     Problem: NEUROLOGICAL - ADULT  Goal: Achieves stable or improved neurological status  Description: INTERVENTIONS  - Assess for and report changes in neurological status  - Initiate measures to prevent increased intracranial pressure  - Maintain blood pressure and fluid volume within ordered parameters to optimize cerebral perfusion and minimize risk of hemorrhage  - Monitor temperature, glucose, and sodium. Initiate appropriate interventions as ordered  Outcome: Adequate for Discharge  Goal: Absence of seizures  Description: INTERVENTIONS  - Monitor for seizure activity  - Administer anti-seizure medications as ordered  - Monitor neurological status  Outcome: Adequate for Discharge  Goal: Remains free of injury related to seizure activity  Description: INTERVENTIONS:  - Maintain airway, patient safety  and administer oxygen as ordered  - Monitor patient for seizure activity, document and report duration and description of seizure to MD/LIP  - If seizure occurs, turn patient to side and suction secretions as needed  - Reorient patient post seizure  - Seizure pads on all 4 side rails  - Instruct patient/family to notify RN of any seizure activity  - Instruct patient/family to call for assistance with activity based on assessment  Outcome: Adequate for Discharge  Goal: Achieves maximal functionality and self care  Description: INTERVENTIONS  - Monitor swallowing and airway patency with patient fatigue and changes in  neurological status  - Encourage and assist patient to increase activity and self care with guidance from PT/OT  - Encourage visually impaired, hearing impaired and aphasic patients to use assistive/communication devices  Outcome: Adequate for Discharge     Problem: SAFETY ADULT - FALL  Goal: Free from fall injury  Description: INTERVENTIONS:  - Assess pt frequently for physical needs  - Identify cognitive and physical deficits and behaviors that affect risk of falls.  - [place] fall precautions as indicated by assessment.  - Educate pt/family on patient safety including physical limitations  - Instruct pt to call for assistance with activity based on assessment  - Modify environment to reduce risk of injury  - Provide assistive devices as appropriate  - Consider OT/PT consult to assist with strengthening/mobility  - Encourage toileting schedule  Outcome: Adequate for Discharge     Problem: PAIN - ADULT  Goal: Verbalizes/displays adequate comfort level or patient's stated pain goal  Description: INTERVENTIONS:  - Encourage pt to monitor pain and request assistance  - Assess pain using appropriate pain scale  - Administer analgesics based on type and severity of pain and evaluate response  - Implement non-pharmacological measures as appropriate and evaluate response  - Consider cultural and social influences on pain and pain management  - Manage/alleviate anxiety  - Utilize distraction and/or relaxation techniques  - Monitor for opioid side effects  - Notify MD/LIP if interventions unsuccessful or patient reports new pain  - Anticipate increased pain with activity and pre-medicate as appropriate  Outcome: Adequate for Discharge      Assumed care at approx 0730  A/Ox3, RA, NSR, VSS on tele  Denies pain  Ambulating SBA to bathroom.  Tolerating diet.  Cleared for discharge by Neurology, EEG normal.  Comfort measures maintained.   Discharge paperwork reviewed with patient and patient verbalized understanding.   IV  removed per protocol. Patient had no further questions at this time.   Patient discharged into son-in-laws care and left via private vehicle.

## 2024-07-04 NOTE — PLAN OF CARE
Assumed care at approx 1930  A/Ox3, RA, NSR, VSS on tele  Denies pain  Ambulating SBA to bathroom.  Tolerating diet.  EEG results pending.  Comfort measures maintained. Pt updated on POC.

## 2024-07-05 ENCOUNTER — PATIENT OUTREACH (OUTPATIENT)
Dept: CASE MANAGEMENT | Age: 89
End: 2024-07-05

## 2024-07-05 DIAGNOSIS — R47.01 APHASIA: Primary | ICD-10-CM

## 2024-07-05 DIAGNOSIS — R47.01 EXPRESSIVE APHASIA: ICD-10-CM

## 2024-07-05 PROCEDURE — 1111F DSCHRG MED/CURRENT MED MERGE: CPT

## 2024-07-05 NOTE — PROGRESS NOTES
Transitions of Care Navigation  Discharge Date: 24  Contact Date: 2024    Discharge Diagnosis:  #Aphasia, resolved  #Possible TIA  #HTN  #Paroxysmal Afib  #Hypothyroidism  #HLD      Transitions of Care Assessment:  MARISA Initial Assessment    General:  Assessment completed with: Patient  Patient Subjective: Pt feeling better, since hospital discharge--some fatigue, but improving--tolerating diet, staying hydrated, independent with ADLs--pt picking up new medications today. Pt denies fever, chills, headache, vision changes, dizziness, nausea, vomiting, diarrhea, chest pain or shortness of breath at this time-speaking in full, clear sentences. pt did not bring home IS, but aware to take 10 slow, deep breaths once/hour, while awake.  Chief Complaint: Aphasia, resolved  Verify patient name and  with patient/ caregiver: Yes    Hospital Stay/Discharge:  Tell me what you understand of why you were in the hospital or emergency department: symptoms of confusion, expressive asphasia  Prior to leaving the hospital were your Discharge Instructions reviewed with you?: Yes  Did you receive a copy of your written Discharge Instructions?: Yes  What questions do you have about your Discharge Instructions?: none  Do you feel better or worse since you left the hospital or emergency department?: Better    Follow - Up Appointment:  Do you have a follow-up appointment?: Yes  Date: 24  Physician: Dr. VIDYA Elias  Are there any barriers to getting to your follow-up appointment?: No    Home Health/DME:  Prior to leaving the hospital was Home Health (HH) arranged for you?: N/A  Are HH needs identified by staff during the assessment?: No     Prior to leaving the hospital or emergency department was Durable Medical Equipment (DME), medical supplies, or infusions arranged for you?: Yes (IS)  Have you received your DME/supplies/infusions?: No (pt did not take home)     Medications/Diet:  Did any of your medications change, during or  after your hospital stay or ED visit?: Yes  Do you have your new or updated medications?: No (picking up today)  Do you understand what your medications are for and possible side effects?: Yes  Are there any reasons that keep you from taking your medication as prescribed?: No  Any concerns about medication refills?: No    Were you given a different diet per your Discharge Instructions?: No  Reason: not needed     Questions/Concerns:  Do you have any questions or concerns that have not been discussed?: No     MARISA Follow-up Assessment    General:  Assessment completed with: Patient  Patient Subjective: Pt feeling better, since hospital discharge--some fatigue, but improving--tolerating diet, staying hydrated, independent with ADLs--pt picking up new medications today. Pt denies fever, chills, headache, vision changes, dizziness, nausea, vomiting, diarrhea, chest pain or shortness of breath at this time-speaking in full, clear sentences. pt did not bring home IS, but aware to take 10 slow, deep breaths once/hour, while awake.  Chief Complaint: Aphasia, resolved  Community Resources: Other (outpt PT)    Progress/Care Plan:  Is the patient progressing as planned?: Yes         Nursing Interventions: Discussed diet, activity, medications and need for f/u visits. Pt confirms 7/08/2024 TCM/MARISA appt with Dr. VIDYA Elias. Pt calling Dr. Avalos's office today for 4-6 week f/u appt--declines appt assist. Pt also confirms outpt PT order--she will consider scheduling, if she does not continue to improve.  Patient aware when to contact PCP/specialists and when to seek emergency care. No further questions/concerns at this time.    Medications:  Current Outpatient Medications   Medication Sig Dispense Refill    ezetimibe 10 MG Oral Tab Take 1 tablet (10 mg total) by mouth nightly. 30 tablet 0    aspirin 81 MG Oral Chew Tab Chew 1 tablet (81 mg total) by mouth daily. 30 tablet 0    metoprolol succinate ER 25 MG Oral Tablet 24 Hr Take 1  tablet (25 mg total) by mouth in the morning and 1 tablet (25 mg total) before bedtime.      escitalopram 10 MG Oral Tab Take 1 tablet (10 mg total) by mouth daily. 90 tablet 0    LEVOTHYROXINE 50 MCG Oral Tab TAKE ONE TABLET BY MOUTH ONCE DAILY BEFORE BREAKFAST 90 tablet 0    LORazepam 0.5 MG Oral Tab Take 1 tablet (0.5 mg total) by mouth as needed for Anxiety (Take 30 minutes before mri, do not drive after taking). 1 tablet 0    triamcinolone 0.1 % External Cream       hydrocortisone 2.5 % External Cream Apply thin layer to affected areas twice daily x 2 weeks on and 2 weeks off, then restart 30 g 2    Rivaroxaban (XARELTO) 20 MG Oral Tab Take 1 tablet (20 mg total) by mouth daily. (Patient taking differently: Take 1 tablet (20 mg total) by mouth daily. Takes nightly also) 90 tablet 3    Probiotic Product (PROBIOTIC DAILY OR) Take by mouth daily.      Multiple Vitamin (MULTI-VITAMIN DAILY) Oral Tab Take by mouth daily.        Misc Natural Products (LUTEIN 20 OR) Take by mouth daily.        Ascorbic Acid (SUPER C COMPLEX OR) Take by mouth daily.         Follow-up Appointments:  Follow-up Information    Follow up With Specialties Details Why Contact Info   David Canchola MD Family Medicine, IP Consult to Primary Care Follow up in 1 week(s)  [address] [place] [address] [address]  [facility] [address] [phone]   Austin Avalos DO NEUROLOGY Call in 1 day(s) Follow up with Neurology Clinic in 4-6 weeks [address] [place] [address] [address]  [facility] [address] [phone]     Transitional [facility] Clinic  Was TCC Ordered: No      Primary Care Provider (If no TCC appointment)  Does patient already have a PCP appointment scheduled? Yes  Nurse Care Manager Confirmed PCP office TCM/MARISA appointment with patient 7/08/24 with Dr. VIDYA Elias     Specialist  Does the patient have any other follow-up appointment(s) need to be scheduled? Yes   -If yes: Nurse Care Manager reviewed upcoming specialist appointments with patient: Yes   -Does the  patient need assistance scheduling appointment(s): No--pt calling Dr. Avalos's office today--declines appt assist    Book By Date: 7/11/2024

## 2024-07-05 NOTE — PAYOR COMM NOTE
--------------  DISCHARGE REVIEW    Payor: [employer] AD[employer] PPO  Subscriber #:  [id number]  Authorization Number: [id number]    Admit date: 7/3/24  Admit time:   1:51 AM  Discharge Date: 7/4/2024  4:42 PM      Discharge Summary Notes    No notes of this type exist for this encounter.

## 2024-07-08 ENCOUNTER — OFFICE VISIT (OUTPATIENT)
Dept: FAMILY MEDICINE CLINIC | Facility: CLINIC | Age: 89
End: 2024-07-08
Payer: MEDICARE

## 2024-07-08 ENCOUNTER — PATIENT OUTREACH (OUTPATIENT)
Dept: CASE MANAGEMENT | Age: 89
End: 2024-07-08

## 2024-07-08 VITALS
BODY MASS INDEX: 28 KG/M2 | OXYGEN SATURATION: 98 % | WEIGHT: 151 LBS | HEART RATE: 76 BPM | DIASTOLIC BLOOD PRESSURE: 66 MMHG | SYSTOLIC BLOOD PRESSURE: 112 MMHG

## 2024-07-08 DIAGNOSIS — I48.0 PAF (PAROXYSMAL ATRIAL FIBRILLATION) (HCC): ICD-10-CM

## 2024-07-08 DIAGNOSIS — R47.01 APHASIA: ICD-10-CM

## 2024-07-08 DIAGNOSIS — G45.9 TIA (TRANSIENT ISCHEMIC ATTACK): Primary | ICD-10-CM

## 2024-07-08 PROCEDURE — 99496 TRANSJ CARE MGMT HIGH F2F 7D: CPT | Performed by: FAMILY MEDICINE

## 2024-07-08 PROCEDURE — 1160F RVW MEDS BY RX/DR IN RCRD: CPT | Performed by: FAMILY MEDICINE

## 2024-07-08 PROCEDURE — 1159F MED LIST DOCD IN RCRD: CPT | Performed by: FAMILY MEDICINE

## 2024-07-08 PROCEDURE — 3074F SYST BP LT 130 MM HG: CPT | Performed by: FAMILY MEDICINE

## 2024-07-08 PROCEDURE — 3078F DIAST BP <80 MM HG: CPT | Performed by: FAMILY MEDICINE

## 2024-07-08 PROCEDURE — 1111F DSCHRG MED/CURRENT MED MERGE: CPT | Performed by: FAMILY MEDICINE

## 2024-07-08 RX ORDER — ASPIRIN 81 MG/1
81 TABLET, CHEWABLE ORAL DAILY
Qty: 90 TABLET | Refills: 3 | Status: SHIPPED | OUTPATIENT
Start: 2024-07-08 | End: 2025-07-03

## 2024-07-08 RX ORDER — EZETIMIBE 10 MG/1
10 TABLET ORAL NIGHTLY
Qty: 90 TABLET | Refills: 3 | Status: SHIPPED | OUTPATIENT
Start: 2024-07-08 | End: 2025-07-03

## 2024-07-08 NOTE — PROGRESS NOTES
1st attempt [facility] apt request  STROKE -exiting apt (7/31)  PCP -existing apt (7/8)  Closing encounter

## 2024-07-09 NOTE — PROGRESS NOTES
Subjective:   Bella Kirby is a 88 year old female who presents for hospital follow up.   She was discharged from [facility] to Home or Self Care  Admission Date: 7/2/24   Discharge Date: 7/4/24  Hospital Discharge Diagnosis: TIA    Interactive contact within 2 business days post discharge first initiated on Date: 7/5/24    During the visit, the following was completed:  Obtained and reviewed discharge summary, continuity of care documents, and Hospitalist notes  Reviewed Labs (CBC, CMP) and CT radiology results    HPI: Bella Kirby is a 88 year old female with history of stroke, hypertension, hyperlipidemia, hypothyroidism, paroxysmal atrial fibrillation presents to the ER with confusion and expressive aphasia. Last known normal was at 1900. No headache no visual changes. No numbness or tingling in the face, upper or lower extremities. No fevers, chills, nausea, vomiting, diarrhea or constipation.   No residual symptoms.  Symptoms lasted less then 24 hrs.  Hx of TIAs    History/Other:   Current Medications:  Medication Reconciliation:  I am aware of an inpatient discharge within the last 30 days.  The discharge medication list has been reconciled with the patient's current medication list and reviewed by me.  See medication list for additions of new medication, and changes to current doses of medications and discontinued medications.  Outpatient Medications Marked as Taking for the 7/8/24 encounter (Office Visit) with Avery Elias, DO   Medication Sig    aspirin 81 MG Oral Chew Tab Chew 1 tablet (81 mg total) by mouth daily.    ezetimibe 10 MG Oral Tab Take 1 tablet (10 mg total) by mouth nightly.       Review of Systems:  GENERAL: weight stable, energy stable, no sweating  SKIN: denies any unusual skin lesions  EYES: denies blurred vision or double vision  HEENT: denies nasal congestion, sinus pain or ST  LUNGS: denies shortness of breath with exertion  CARDIOVASCULAR: denies chest pain on exertion or  palpitations  GI: denies abdominal pain, denies heartburn, denies diarrhea  MUSCULOSKELETAL: denies pain, normal range of motion of extremities  NEURO: denies headaches, denies dizziness, denies weakness  PSYCHE: denies depression or anxiety  HEMATOLOGIC: denies hx of anemia, or bruising, denies bleeding  ENDOCRINE: denies thyroid history  ALL/ASTHMA: denies hx of allergy or asthma    Objective:   No LMP recorded. Patient is postmenopausal.  Estimated body mass index is 27.62 kg/m² as calculated from the following:    Height as of 6/13/24: 5' 2\" (1.575 m).    Weight as of this encounter: 151 lb (68.5 kg).   /66   Pulse 76   Wt 151 lb (68.5 kg)   SpO2 98%   BMI 27.62 kg/m²    GENERAL: well developed, well nourished, in no apparent distress  SKIN: no rashes, no suspicious lesions  HEENT: atraumatic, normocephalic, ears and throat are clear  EYES: PERRLA, EOMI, conjunctiva are clear  NECK: supple, no adenopathy, no bruits  CHEST: no chest tenderness  LUNGS: clear to auscultation  CARDIO: RRR without murmur  GI: good BS's, no masses, HSM or tenderness  MUSCULOSKELETAL: back is not tender, FROM of the extremities  EXTREMITIES: no cyanosis, clubbing or edema  NEURO: Oriented times three, cranial nerves are intact, motor and sensory are grossly intact    Assessment & Plan:   1. TIA (transient ischemic attack) (Primary)  -     Aspirin; Chew 1 tablet (81 mg total) by mouth daily.  Dispense: 90 tablet; Refill: 3  -     Ezetimibe; Take 1 tablet (10 mg total) by mouth nightly.  Dispense: 90 tablet; Refill: 3  2. Aphasia.  Resolved.  3. PAF (paroxysmal atrial fibrillation) (HCC).  On xarelto.    Advised her to stop blood thinner if heavy bleeding or getting unsteady on her feet.        No follow-ups on file.

## 2024-07-16 RX ORDER — LEVOTHYROXINE SODIUM 0.05 MG/1
50 TABLET ORAL
Qty: 90 TABLET | Refills: 0 | Status: SHIPPED | OUTPATIENT
Start: 2024-07-16

## 2024-07-23 DIAGNOSIS — F32.5 MAJOR DEPRESSIVE DISORDER IN REMISSION, UNSPECIFIED WHETHER RECURRENT (HCC): ICD-10-CM

## 2024-07-23 DIAGNOSIS — F41.9 ANXIETY: ICD-10-CM

## 2024-07-23 RX ORDER — ESCITALOPRAM OXALATE 10 MG/1
10 TABLET ORAL DAILY
Qty: 90 TABLET | Refills: 0 | Status: SHIPPED | OUTPATIENT
Start: 2024-07-23

## 2024-07-24 ENCOUNTER — MOBILE ENCOUNTER (OUTPATIENT)
Dept: FAMILY MEDICINE CLINIC | Facility: CLINIC | Age: 89
End: 2024-07-24

## 2024-07-25 NOTE — PROGRESS NOTES
Full code phone call received at 8:20 PM on July 24, 2024.  Patient has had right upper quadrant abdominal pain since 2 PM.  There is associated nausea and belching.  This started after a bologna sandwich.  She denies fevers but does have chills.  She feels like the chills may be from anxiety.  She tried activated charcoal which gave her no relief.  She denies dark stools.  There is a normal bowel movement this morning.  Not bloody or black.  This evening her temperature is 97.6 °F.  We are concerned about possible cholecystitis with right upper quadrant pain and chills.  With the gas and belching I have asked her to take Tums.  If her pain does not resolve in an hour, she needs to go to the ER to be evaluated for acute cholecystitis.  She is on ampicillin for her recent skin biopsy from the leg, this antibiotic is likely not covering bacteria that may affect her gallbladder.  She reluctantly agreed to go to the ER in an hour if not improving.

## 2024-08-05 ENCOUNTER — OFFICE VISIT (OUTPATIENT)
Dept: FAMILY MEDICINE CLINIC | Facility: CLINIC | Age: 89
End: 2024-08-05
Payer: MEDICARE

## 2024-08-05 VITALS
WEIGHT: 145 LBS | HEART RATE: 75 BPM | SYSTOLIC BLOOD PRESSURE: 140 MMHG | RESPIRATION RATE: 16 BRPM | DIASTOLIC BLOOD PRESSURE: 68 MMHG | OXYGEN SATURATION: 96 % | BODY MASS INDEX: 26.68 KG/M2 | HEIGHT: 62 IN

## 2024-08-05 DIAGNOSIS — F51.01 PRIMARY INSOMNIA: ICD-10-CM

## 2024-08-05 DIAGNOSIS — Z48.89 ENCOUNTER FOR POST SURGICAL WOUND CHECK: ICD-10-CM

## 2024-08-05 DIAGNOSIS — K90.49 FOOD INTOLERANCE IN ADULT: ICD-10-CM

## 2024-08-05 DIAGNOSIS — R10.11 RIGHT UPPER QUADRANT ABDOMINAL PAIN: ICD-10-CM

## 2024-08-05 PROCEDURE — 1159F MED LIST DOCD IN RCRD: CPT | Performed by: FAMILY MEDICINE

## 2024-08-05 PROCEDURE — 3008F BODY MASS INDEX DOCD: CPT | Performed by: FAMILY MEDICINE

## 2024-08-05 PROCEDURE — 1160F RVW MEDS BY RX/DR IN RCRD: CPT | Performed by: FAMILY MEDICINE

## 2024-08-05 PROCEDURE — 99214 OFFICE O/P EST MOD 30 MIN: CPT | Performed by: FAMILY MEDICINE

## 2024-08-05 PROCEDURE — 3078F DIAST BP <80 MM HG: CPT | Performed by: FAMILY MEDICINE

## 2024-08-05 PROCEDURE — 3077F SYST BP >= 140 MM HG: CPT | Performed by: FAMILY MEDICINE

## 2024-08-05 NOTE — PROGRESS NOTES
Has had episodes of nausea and coughing up mucus after eating fatty food in particular of pot roast but also a hot dog.  There has been pain in the upper quadrant of the abdomen.  No change in the bowels.    She is having insomnia.  1 mg of melatonin helps her fall asleep.    She had stitches taken out of her wound in the right shin last Thursday.  They asked her to keep the dressing on for a week.  She feels like her leg was swollen the next few days.  It is better today.    PAST MEDICAL HISTORY:  Past Medical History:    Acute, but ill-defined, cerebrovascular disease    Allergic rhinitis    Anxiety    Arthritis    Back, hips, hands    Atherosclerosis of coronary artery    Back injury    Bone spur of other site    R shoulder    Essential hypertension    Hyperlipidemia    Infection of tooth    Osteoarthritis    Stroke (HCC)    Thyroid disease    TIA (transient ischemic attack)    19     PAST SURGICAL HISTORY:  Past Surgical History:   Procedure Laterality Date    Appendectomy      Hysterectomy      partial      3    Skin surgery  11/10/2021    BCC left sandy of jaw mohs by Dr Victor    Tonsillectomy       MEDICATIONS:  Current Outpatient Medications   Medication Sig Dispense Refill    ESCITALOPRAM 10 MG Oral Tab TAKE ONE TABLET BY MOUTH ONCE DAILY 90 tablet 0    LEVOTHYROXINE 50 MCG Oral Tab TAKE ONE TABLET BY MOUTH ONCE DAILY BEFORE BREAKFAST 90 tablet 0    aspirin 81 MG Oral Chew Tab Chew 1 tablet (81 mg total) by mouth daily. 90 tablet 3    metoprolol succinate ER 25 MG Oral Tablet 24 Hr Take 1 tablet (25 mg total) by mouth in the morning and 1 tablet (25 mg total) before bedtime.      LORazepam 0.5 MG Oral Tab Take 1 tablet (0.5 mg total) by mouth as needed for Anxiety (Take 30 minutes before mri, do not drive after taking). 1 tablet 0    triamcinolone 0.1 % External Cream       hydrocortisone 2.5 % External Cream Apply thin layer to affected areas twice daily x 2 weeks on and 2 weeks off, then  restart 30 g 2    Rivaroxaban (XARELTO) 20 MG Oral Tab Take 1 tablet (20 mg total) by mouth daily. (Patient taking differently: Take 1 tablet (20 mg total) by mouth daily. Takes nightly also) 90 tablet 3    Multiple Vitamin (MULTI-VITAMIN DAILY) Oral Tab Take by mouth daily.        Misc Natural Products (LUTEIN 20 OR) Take by mouth daily.        Ascorbic Acid (SUPER C COMPLEX OR) Take by mouth daily.        Probiotic Product (PROBIOTIC DAILY OR) Take by mouth daily.       ALLERGIES:   Acyclovir, Amoxicillin, Atorvastatin, Azithromycin, Brinzolamide, Brompheniramine-phenylephrine, Cefaclor, Cephalexin, Cinoxacin, Clarithromycin, Cyclobenzaprine, Darvon [propoxyphene], Diphenoxylate-atropine, Doxycycline, Epinephrine, Ibuprofen, Latex, Loperamide, Loracarbef, [place], Methylprednisolone, Metronidazole, Miconazole, Nitrofurantoin, Olive oil, Paxil [paroxetine], Pyridin [phenazopyridine], Rosuvastatin, Sulfamethoxazole, Synalgos dc, Donnatal [belladonna alk-phenobarbital], Gantrisin [sulfisoxazole], Sudafed [pseudoephedrine], Trazodone, Ampicillin, Bactrim [sulfamethoxazole w/trimethoprim], Dimetapp cold-allergy [brompheniramine-phenylephrine], Effexor [venlafaxine], Mycostatin [nystatin], and Radiology contrast iodinated dyes  FAMILY HISTORY  Family History   Problem Relation Age of Onset    Stroke Mother     Heart Disorder Father     Other (Down's Syndrome) Sister     Other (hypotension) Brother 84        orthostatsis due to Parkinson's    Other (hypoglycemia) Brother 81    Other (Parkinson's disease) Brother     Hyperlipidemia Brother     Hyperlipidemia Brother     Other (malabsorption) Daughter     Hyperlipidemia Daughter     Other (cardiac shunt) Daughter         repaired age 17 surgically    Hyperlipidemia Daughter     Other (osteoarthritis) Daughter         hip       PHYSICAL EXAM:  /68   Pulse 75   Resp 16   Ht 5' 2\" (1.575 m)   Wt 145 lb (65.8 kg)   SpO2 96%   BMI 26.52 kg/m²     Neck no  supraclavicular lymphadenopathy.  Chest wall nontender.  Abdomen positive bowel sounds no epigastric tenderness.  She is tender in the right and left upper quadrant just below the ribs.  Back no CVA tenderness.  Extremities 2+ pulses no edema.  I removed the dressing from her right anterior shin.  Clean dry and intact with Steri-Strips.  There is a little dried blood.  I replaced the dressing.  She may take it off on Thursday.    ASSESSMENT/PLAN:    1. Right upper quadrant abdominal pain  Concerning for gallbladder disease given the intolerance to greasy foods  - US ABDOMEN COMPLETE (CPT=76700); Future    2. Food intolerance in adult  See #1  - US ABDOMEN COMPLETE (CPT=76700); Future    3. Encounter for post surgical wound check  Remove dressing on Thursday.  No signs of infection    4. Primary insomnia  Take melatonin 1 mg nightly for 3 weeks.  Then discontinue and see if this helps to reset her circadian rhythm for sleep.         If this note is coded by time based on the Office/Outpatient Evaluation and Management Codes effective January 1, 2021, the time includes reviewing the chart before entering the exam room, the time spent with the patient in face to face discussion and examination, and the time documenting the visit.  It may also include time ordering tests or reviewing test results completed on the same day.

## 2024-08-07 ENCOUNTER — OFFICE VISIT (OUTPATIENT)
Dept: NEUROLOGY | Facility: CLINIC | Age: 89
End: 2024-08-07
Payer: MEDICARE

## 2024-08-07 VITALS
BODY MASS INDEX: 27 KG/M2 | DIASTOLIC BLOOD PRESSURE: 70 MMHG | OXYGEN SATURATION: 97 % | SYSTOLIC BLOOD PRESSURE: 126 MMHG | WEIGHT: 145 LBS | HEART RATE: 77 BPM

## 2024-08-07 DIAGNOSIS — R40.4 TRANSIENT ALTERATION OF AWARENESS: Primary | ICD-10-CM

## 2024-08-07 PROCEDURE — 1159F MED LIST DOCD IN RCRD: CPT | Performed by: OTHER

## 2024-08-07 PROCEDURE — 3078F DIAST BP <80 MM HG: CPT | Performed by: OTHER

## 2024-08-07 PROCEDURE — 3074F SYST BP LT 130 MM HG: CPT | Performed by: OTHER

## 2024-08-07 PROCEDURE — 99214 OFFICE O/P EST MOD 30 MIN: CPT | Performed by: OTHER

## 2024-08-07 RX ORDER — LOSARTAN POTASSIUM 25 MG/1
25 TABLET ORAL DAILY
COMMUNITY

## 2024-08-07 NOTE — PATIENT INSTRUCTIONS
Refill policies:    Allow 2-3 business days for refills; controlled substances may take longer.  Contact your pharmacy at least 5 days prior to running out of medication and have them send an electronic request or submit request through the “request refill” option in your [employer] account.  Refills are not addressed on weekends; covering physicians do not authorize routine medications on weekends.  No narcotics or controlled substances are refilled after noon on Fridays or by on call physicians.  By law, narcotics must be electronically prescribed.  A 30 day supply with no refills is the maximum allowed.  If your prescription is due for a refill, you may be due for a follow up appointment.  To best provide you care, patients receiving routine medications need to be seen at least once a year.  Patients receiving narcotic/controlled substance medications need to be seen at least once every 3 months.  In the event that your preferred pharmacy does not have the requested medication in stock (e.g. Backordered), it is your responsibility to find another pharmacy that has the requested medication available.  We will gladly send a new prescription to that pharmacy at your request.    Scheduling Tests:    If your physician has ordered radiology tests such as MRI or CT scans, please contact Central Scheduling at [phone] right away to schedule the test.  Once scheduled, the [facility] Centralized Referral Team will work with your insurance carrier to obtain pre-certification or prior authorization.  Depending on your insurance carrier, approval may take 3-10 days.  It is highly recommended patients assure they have received an authorization before having a test performed.  If test is done without insurance authorization, patient may be responsible for the entire amount billed.      Precertification and Prior Authorizations:  If your physician has recommended that you have a procedure or additional testing performed the [facility]  Centralized Referral Team will contact your insurance carrier to obtain pre-certification or prior authorization.    You are strongly encouraged to contact your insurance carrier to verify that your procedure/test has been approved and is a COVERED benefit.  Although the [facility] Centralized Referral Team does its due diligence, the insurance carrier gives the disclaimer that \"Although the procedure is authorized, this does not guarantee payment.\"    Ultimately the patient is responsible for payment.   Thank you for your understanding in this matter.  Paperwork Completion:  If you require FMLA or disability paperwork for your recovery, please make sure to either drop it off or have it faxed to our office at [phone]. Be sure the form has your name and date of birth on it.  The form will be faxed to our Forms Department and they will complete it for you.  There is a 25$ fee for all forms that need to be filled out.  Please be aware there is a 10-14 day turnaround time.  You will need to sign a release of information (SHELLEY) form if your paperwork does not come with one.  You may call the Forms Department with any questions at [phone].  Their fax number is [phone].

## 2024-08-07 NOTE — PROGRESS NOTES
[facility] Neurology Outpatient Progress Note  Date of service: 8/7/2024    Assessment:     ICD-10-CM    1. Transient alteration of awareness  R40.4    TIA likely       Plan:  Continue ASA 81mg + Xarelto  Allergic to statin  Stroke education given  Monitor for bleeding  RTC 3 months  Pt should go ER for any new or worsening symptoms and contact office     Subjective:   History:  Patient here for a follow-up visit for possible TIA. Since discharge she has been doing, Dr Avalos last saw pt in house.  Brief history:  This is a 88-year-old female admitted with a transient episode of confusion 7/4.  Her EEG was unremarkable.  MRI of the brain and CTA of the head and neck did not demonstrate any acute lesions.  No bleeds no stroke.  History/Other:   REVIEW OF SYSTEMS:  A 10-point system was reviewed. Pertinent positives and negatives are noted as above       Current Outpatient Medications:     losartan 25 MG Oral Tab, Take 1 tablet (25 mg total) by mouth daily., Disp: , Rfl:     ESCITALOPRAM 10 MG Oral Tab, TAKE ONE TABLET BY MOUTH ONCE DAILY, Disp: 90 tablet, Rfl: 0    LEVOTHYROXINE 50 MCG Oral Tab, TAKE ONE TABLET BY MOUTH ONCE DAILY BEFORE BREAKFAST, Disp: 90 tablet, Rfl: 0    metoprolol succinate ER 25 MG Oral Tablet 24 Hr, Take 1 tablet (25 mg total) by mouth in the morning and 1 tablet (25 mg total) before bedtime., Disp: , Rfl:     hydrocortisone 2.5 % External Cream, Apply thin layer to affected areas twice daily x 2 weeks on and 2 weeks off, then restart, Disp: 30 g, Rfl: 2    Rivaroxaban (XARELTO) 20 MG Oral Tab, Take 1 tablet (20 mg total) by mouth daily. (Patient taking differently: Take 1 tablet (20 mg total) by mouth daily. Takes nightly also), Disp: 90 tablet, Rfl: 3    Probiotic Product (PROBIOTIC DAILY OR), Take by mouth daily., Disp: , Rfl:     Multiple Vitamin (MULTI-VITAMIN DAILY) Oral Tab, Take by mouth daily.  , Disp: , Rfl:     Misc Natural Products (LUTEIN 20 OR), Take by mouth daily.  , Disp: , Rfl:      Ascorbic Acid (SUPER C COMPLEX OR), Take by mouth daily.  , Disp: , Rfl:     aspirin 81 MG Oral Chew Tab, Chew 1 tablet (81 mg total) by mouth daily. (Patient not taking: Reported on 8/7/2024), Disp: 90 tablet, Rfl: 3  Allergies:  Allergies   Allergen Reactions    Acyclovir OTHER (SEE COMMENTS)    Amoxicillin     Atorvastatin MYALGIA and OTHER (SEE COMMENTS)    Azithromycin OTHER (SEE COMMENTS)    Brinzolamide OTHER (SEE COMMENTS)    Brompheniramine-Phenylephrine OTHER (SEE COMMENTS)    Cefaclor ANAPHYLAXIS     Headache    Headache  Headache    Cephalexin OTHER (SEE COMMENTS)    Cinoxacin OTHER (SEE COMMENTS)    Clarithromycin OTHER (SEE COMMENTS)    Cyclobenzaprine OTHER (SEE COMMENTS)    Darvon [Propoxyphene]     Diphenoxylate-Atropine OTHER (SEE COMMENTS)    Doxycycline OTHER (SEE COMMENTS)    Epinephrine OTHER (SEE COMMENTS)    Ibuprofen OTHER (SEE COMMENTS)    Latex OTHER (SEE COMMENTS)    Loperamide OTHER (SEE COMMENTS)    Loracarbef OTHER (SEE COMMENTS)    Mayonaise     Methylprednisolone OTHER (SEE COMMENTS)    Metronidazole OTHER (SEE COMMENTS)     Flagyl  Flagyl  Flagyl    Miconazole OTHER (SEE COMMENTS)    Nitrofurantoin OTHER (SEE COMMENTS)    Olive Oil OTHER (SEE COMMENTS)    Paxil [Paroxetine]     Pyridin [Phenazopyridine]     Rosuvastatin OTHER (SEE COMMENTS)     Agitation   Agitation   Agitation     Sulfamethoxazole      Throat swelling    Synalgos Dc     Donnatal [Belladonna Alk-Phenobarbital]     Gantrisin [Sulfisoxazole]     Sudafed [Pseudoephedrine]     Trazodone     Ampicillin     Bactrim [Sulfamethoxazole W/Trimethoprim]     Dimetapp Cold-Allergy [Brompheniramine-Phenylephrine]     Effexor [Venlafaxine]      Headache      Mycostatin [Nystatin]     Radiology Contrast Iodinated Dyes RASH     MRI contrast per patient     Past Medical History:    Acute, but ill-defined, cerebrovascular disease    Allergic rhinitis    Anxiety    Arthritis    Back, hips, hands    Atherosclerosis of coronary artery     Back injury    Bone spur of other site    R shoulder    Essential hypertension    Hyperlipidemia    Infection of tooth    Osteoarthritis    Stroke (HCC)    Thyroid disease    TIA (transient ischemic attack)    19     Past Surgical History:   Procedure Laterality Date    Appendectomy      Hysterectomy      partial      3    Skin surgery  11/10/2021    BCC left sandy of jaw mohs by Dr Victor    Tonsillectomy       Social History:  Social History     Tobacco Use    Smoking status: Never    Smokeless tobacco: Never   Substance Use Topics    Alcohol use: Never     Family History   Problem Relation Age of Onset    Stroke Mother     Heart Disorder Father     Other (Down's Syndrome) Sister     Other (hypotension) Brother 84        orthostatsis due to Parkinson's    Other (hypoglycemia) Brother 81    Other (Parkinson's disease) Brother     Hyperlipidemia Brother     Hyperlipidemia Brother     Other (malabsorption) Daughter     Hyperlipidemia Daughter     Other (cardiac shunt) Daughter         repaired age 17 surgically    Hyperlipidemia Daughter     Other (osteoarthritis) Daughter         hip      Objective:   Neurological Examination:  /70   Pulse 77   Wt 145 lb (65.8 kg)   SpO2 97%   BMI 26.52 kg/m²   Mental status: A & O X 3  Language: no aphasia  Speech: no dysarthria  CN II-XII: intact   Motor strength: 5/5 all extremities  Tone: normal  Coordination: normal  Sensory: symmetric  Gait: normal    Test reviewed on 2024      Doug \"Mynor\" MD Lai  Neurology  [facility]  2024, 3:01 PM  CC: David Canchola MD   O-Z Plasty Text: The defect edges were debeveled with a #15 scalpel blade.  Given the location of the defect, shape of the defect and the proximity to free margins an O-Z plasty (double transposition flap) was deemed most appropriate.  Using a sterile surgical marker, the appropriate transposition flaps were drawn incorporating the defect and placing the expected incisions within the relaxed skin tension lines where possible.    The area thus outlined was incised deep to adipose tissue with a #15 scalpel blade.  The skin margins were undermined to an appropriate distance in all directions utilizing iris scissors.  Hemostasis was achieved with electrocautery.  The flaps were then transposed into place, one clockwise and the other counterclockwise, and anchored with interrupted buried subcutaneous sutures.

## 2024-08-12 ENCOUNTER — TELEPHONE (OUTPATIENT)
Dept: NEUROLOGY | Facility: CLINIC | Age: 89
End: 2024-08-12

## 2024-08-12 NOTE — TELEPHONE ENCOUNTER
Provider put Pt back on baby apririn. She had successful cancer sx on her leg. Yesterday it was really bleeding so she stopped the baby asprin. Bleeding stopped after applying pressure and tight bandage. Please advise, Pt's best cb # [phone]. Endorsed to RN.

## 2024-08-12 NOTE — TELEPHONE ENCOUNTER
Patient has surgery on her leg in July. Stitches were removed on 8/1/24. Adivsed patient should continue to be on xarelto and Asprin. She will go back on it and consult with her Dermatologist.

## 2024-08-15 ENCOUNTER — OFFICE VISIT (OUTPATIENT)
Dept: FAMILY MEDICINE CLINIC | Facility: CLINIC | Age: 89
End: 2024-08-15
Payer: MEDICARE

## 2024-08-15 ENCOUNTER — HOSPITAL ENCOUNTER (OUTPATIENT)
Dept: GENERAL RADIOLOGY | Age: 89
Discharge: HOME | End: 2024-08-15
Attending: FAMILY MEDICINE
Payer: MEDICARE

## 2024-08-15 VITALS
OXYGEN SATURATION: 98 % | BODY MASS INDEX: 27.05 KG/M2 | RESPIRATION RATE: 20 BRPM | HEART RATE: 92 BPM | WEIGHT: 147 LBS | HEIGHT: 62 IN | SYSTOLIC BLOOD PRESSURE: 110 MMHG | DIASTOLIC BLOOD PRESSURE: 64 MMHG | TEMPERATURE: 98 F

## 2024-08-15 DIAGNOSIS — L03.113 CELLULITIS OF RIGHT UPPER EXTREMITY: ICD-10-CM

## 2024-08-15 DIAGNOSIS — L97.919 ULCER OF RIGHT LOWER EXTREMITY, UNSPECIFIED ULCER STAGE (HCC): ICD-10-CM

## 2024-08-15 DIAGNOSIS — L03.113 CELLULITIS OF RIGHT UPPER EXTREMITY: Primary | ICD-10-CM

## 2024-08-15 PROCEDURE — 1160F RVW MEDS BY RX/DR IN RCRD: CPT | Performed by: FAMILY MEDICINE

## 2024-08-15 PROCEDURE — 73590 X-RAY EXAM OF LOWER LEG: CPT | Performed by: FAMILY MEDICINE

## 2024-08-15 PROCEDURE — 3078F DIAST BP <80 MM HG: CPT | Performed by: FAMILY MEDICINE

## 2024-08-15 PROCEDURE — 3074F SYST BP LT 130 MM HG: CPT | Performed by: FAMILY MEDICINE

## 2024-08-15 PROCEDURE — 99214 OFFICE O/P EST MOD 30 MIN: CPT | Performed by: FAMILY MEDICINE

## 2024-08-15 PROCEDURE — 1159F MED LIST DOCD IN RCRD: CPT | Performed by: FAMILY MEDICINE

## 2024-08-15 PROCEDURE — 3008F BODY MASS INDEX DOCD: CPT | Performed by: FAMILY MEDICINE

## 2024-08-15 RX ORDER — AMPICILLIN 500 MG/1
500 CAPSULE ORAL 4 TIMES DAILY
Qty: 28 CAPSULE | Refills: 0 | Status: SHIPPED | OUTPATIENT
Start: 2024-08-15

## 2024-08-15 RX ORDER — MUPIROCIN CALCIUM 20 MG/G
CREAM TOPICAL
Qty: 15 G | Refills: 1 | Status: SHIPPED | OUTPATIENT
Start: 2024-08-15 | End: 2024-08-19

## 2024-08-15 NOTE — PATIENT INSTRUCTIONS
Any worsening need to go to er stat   Watch for discharge /pus   Redness tenderness pain   Fever chills nausea vomiting

## 2024-08-15 NOTE — PROGRESS NOTES
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 she is following with a dermatologist and is an appointment coming up in 10 days    Patient states he has similar issues which were slightly worse than this time in  may of this yr and she was given ampicillin by Saran Boo /patient states she has multiple allergies she would like to try ampicillin again other medications like Bactrim discussed but she will like to try ampicillin only for now    I took the picture and put it in the media  She denies any side effects on ampicillin states she has taken it in the past for multiple issues including leg infection cellulitis AIDS infection and others without any side effects  The following portions of the patient's history were reviewed and updated as appropriate:  Past Medical History:    Acute, but ill-defined, cerebrovascular disease    Allergic rhinitis    Anxiety    Arthritis    Back, hips, hands    Atherosclerosis of coronary artery    Back injury    Bone spur of other site    R shoulder    Essential hypertension    Hyperlipidemia    Infection of tooth    Osteoarthritis    Stroke (HCC)    Thyroid disease    TIA (transient ischemic attack)    6/23/19     Patient Active Problem List   Diagnosis    Anxiety    Essential hypertension    Osteopenia    Statin intolerance    Mixed hyperlipidemia    PAF (paroxysmal atrial fibrillation) ([facility])    Hypothyroidism    Calcific tendonitis of right upper arm    Peripheral artery disease ([facility])    Carotid artery stenosis, asymptomatic, right    Claudication of both lower extremities ([facility])    Hyperglycemia    Transient  alteration of awareness     Past Surgical History:   Procedure Laterality Date    Appendectomy      Hysterectomy      partial      3    Skin surgery  11/10/2021    BCC left sandy of jaw mohs by Dr Victor    Tonsillectomy       Family History   Problem Relation Age of Onset    Stroke Mother     Heart Disorder Father     Other (Down's Syndrome) Sister     Other (hypotension) Brother 84        orthostatsis due to Parkinson's    Other (hypoglycemia) Brother 81    Other (Parkinson's disease) Brother     Hyperlipidemia Brother     Hyperlipidemia Brother     Other (malabsorption) Daughter     Hyperlipidemia Daughter     Other (cardiac shunt) Daughter         repaired age 17 surgically    Hyperlipidemia Daughter     Other (osteoarthritis) Daughter         hip     Social History     Socioeconomic History    Marital status:    Tobacco Use    Smoking status: Never    Smokeless tobacco: Never   Vaping Use    Vaping status: Never Used   Substance and Sexual Activity    Alcohol use: Never    Drug use: Never   Other Topics Concern    Caffeine Concern Yes     Comment: tea all day long    Exercise Yes     Comment: walking    Seat Belt Yes    Special Diet No    Stress Concern Yes    Weight Concern No       Current Outpatient Medications   Medication Sig Dispense Refill    ampicillin 500 MG Oral Cap Take 1 capsule (500 mg total) by mouth 4 (four) times daily. 28 capsule 0    mupirocin 2 % External Cream Use bid 15 g 1    losartan 25 MG Oral Tab Take 1 tablet (25 mg total) by mouth daily.      ESCITALOPRAM 10 MG Oral Tab TAKE ONE TABLET BY MOUTH ONCE DAILY 90 tablet 0    LEVOTHYROXINE 50 MCG Oral Tab TAKE ONE TABLET BY MOUTH ONCE DAILY BEFORE BREAKFAST 90 tablet 0    aspirin 81 MG Oral Chew Tab Chew 1 tablet (81 mg total) by mouth daily. 90 tablet 3    metoprolol succinate ER 25 MG Oral Tablet 24 Hr Take 1 tablet (25 mg total) by mouth in the morning and 1 tablet (25 mg total) before bedtime.      hydrocortisone 2.5 %  External Cream Apply thin layer to affected areas twice daily x 2 weeks on and 2 weeks off, then restart 30 g 2    Rivaroxaban (XARELTO) 20 MG Oral Tab Take 1 tablet (20 mg total) by mouth daily. (Patient taking differently: Take 1 tablet (20 mg total) by mouth daily. Takes nightly also) 90 tablet 3    Probiotic Product (PROBIOTIC DAILY OR) Take by mouth daily.      Multiple Vitamin (MULTI-VITAMIN DAILY) Oral Tab Take by mouth daily.        Misc Natural Products (LUTEIN 20 OR) Take by mouth daily.        Ascorbic Acid (SUPER C COMPLEX OR) Take by mouth daily.           Allergies   Allergen Reactions    Acyclovir OTHER (SEE COMMENTS)    Amoxicillin     Atorvastatin MYALGIA and OTHER (SEE COMMENTS)    Azithromycin OTHER (SEE COMMENTS)    Brinzolamide OTHER (SEE COMMENTS)    Brompheniramine-Phenylephrine OTHER (SEE COMMENTS)    Cefaclor ANAPHYLAXIS     Headache    Headache  Headache    Cephalexin OTHER (SEE COMMENTS)    Cinoxacin OTHER (SEE COMMENTS)    Clarithromycin OTHER (SEE COMMENTS)    Cyclobenzaprine OTHER (SEE COMMENTS)    Darvon [Propoxyphene]     Diphenoxylate-Atropine OTHER (SEE COMMENTS)    Doxycycline OTHER (SEE COMMENTS)    Epinephrine OTHER (SEE COMMENTS)    Ibuprofen OTHER (SEE COMMENTS)    Latex OTHER (SEE COMMENTS)    Loperamide OTHER (SEE COMMENTS)    Loracarbef OTHER (SEE COMMENTS)    Mayonaise     Methylprednisolone OTHER (SEE COMMENTS)    Metronidazole OTHER (SEE COMMENTS)     Flagyl  Flagyl  Flagyl    Miconazole OTHER (SEE COMMENTS)    Nitrofurantoin OTHER (SEE COMMENTS)    Olive Oil OTHER (SEE COMMENTS)    Paxil [Paroxetine]     Pyridin [Phenazopyridine]     Rosuvastatin OTHER (SEE COMMENTS)     Agitation   Agitation   Agitation     Sulfamethoxazole      Throat swelling    Synalgos Dc     Donnatal [Belladonna Alk-Phenobarbital]     Gantrisin [Sulfisoxazole]     Sudafed [Pseudoephedrine]     Trazodone     Ampicillin     Bactrim [Sulfamethoxazole W/Trimethoprim]     Dimetapp Cold-Allergy  [Brompheniramine-Phenylephrine]     Effexor [Venlafaxine]      Headache      Mycostatin [Nystatin]     Radiology Contrast Iodinated Dyes RASH     MRI contrast per patient         Review of Systems  Const: Denies fever chills  Eyes: Denies drainage no pain with movement of eye  ENT: denies sore throat,no ear pain ,no sinus drainage  CV: Denies chest pain or palpitations  Pulm: Denies shortness of breath  GI: Denies abdominal pain, nausea, vomiting or diarrhea  : Denies dysuria  Musculoskeletal: Denies neck or back pain  Skin: Possible cellulitis right leg per her for the last few days  Neuro: Denies focal weakness or numbness, no headache no dizziness      Vitals:    08/15/24 1330   BP: 110/64   Pulse: 92   Resp: 20   Temp: 98.4 °F (36.9 °C)   SpO2: 98%   Weight: 147 lb (66.7 kg)   Height: 5' 2\" (1.575 m)        Physical Exam  General Appearance:  Alert, oriented, in no acute distress  Eyes no discharge  Neck ;soft supple,no obvious swelling  CNS: no acute focal neurological deficits  Chest Wall:  no chest wall tenderness.  Lungs:  Normal expansion.  Clear to auscultation.   Heart:  Heart sounds are normal.    Abdomen:  Soft, non-tender, normal bowel sounds;  Psych mood and affect appropriate  skin right lower extremity patient has a ulcer there is no discharge/patient states she has not noticed any discharge at home/she is mild erythema and mild tenderness around it please see the picture for detail in the media she is walking in the hallway without any issues she is getting up and down the table without any issues no limb  Lower Extremities: No gross edema,        Assessment/Plan:    Vania was seen today for infection.    Diagnoses and all orders for this visit:    Cellulitis of right upper extremity  -     ampicillin 500 MG Oral Cap; Take 1 capsule (500 mg total) by mouth 4 (four) times daily.  -     mupirocin 2 % External Cream; Use bid  -     Derm Referral - In Network  -     Infectious Disease Referral - In  Network  -     Wound Care Referral - External  -     XR TIBIA + FIBULA (2 VIEWS), RIGHT (CPT=73590); Future  Antibiotics discussed follow-up with the dermatologist follow-up with ID and wound clinic follow-up with us in 1 week early if any issues I had very detailed discussion with her that if any worsening she needs to go to the ER because then she may need IV antibiotics more imaging and others patient very well understands and verbalizes understanding  Ulcer of right lower extremity, unspecified ulcer stage (HCC)  -     Derm Referral - In Network  -     Infectious Disease Referral - In Network  -     Wound Care Referral - External  Plan as above take medications with food side effects of medication discussed importance of follow-up discussed she understands    Patient Active Problem List   Diagnosis    Anxiety    Essential hypertension    Osteopenia    Statin intolerance    Mixed hyperlipidemia    PAF (paroxysmal atrial fibrillation) (HCC)    Hypothyroidism    Calcific tendonitis of right upper arm    Peripheral artery disease (HCC)    Carotid artery stenosis, asymptomatic, right    Claudication of both lower extremities (HCC)    Hyperglycemia    Transient alteration of awareness       Patient's Body mass index is 26.89 kg/m².    .    Return in about 1 week (around 8/22/2024).      ampicillin 500 MG Oral Cap, Take 1 capsule (500 mg total) by mouth 4 (four) times daily., Disp: 28 capsule, Rfl: 0    mupirocin 2 % External Cream, Use bid, Disp: 15 g, Rfl: 1    losartan 25 MG Oral Tab, Take 1 tablet (25 mg total) by mouth daily., Disp: , Rfl:     ESCITALOPRAM 10 MG Oral Tab, TAKE ONE TABLET BY MOUTH ONCE DAILY, Disp: 90 tablet, Rfl: 0    LEVOTHYROXINE 50 MCG Oral Tab, TAKE ONE TABLET BY MOUTH ONCE DAILY BEFORE BREAKFAST, Disp: 90 tablet, Rfl: 0    aspirin 81 MG Oral Chew Tab, Chew 1 tablet (81 mg total) by mouth daily., Disp: 90 tablet, Rfl: 3    metoprolol succinate ER 25 MG Oral Tablet 24 Hr, Take 1 tablet (25 mg  total) by mouth in the morning and 1 tablet (25 mg total) before bedtime., Disp: , Rfl:     hydrocortisone 2.5 % External Cream, Apply thin layer to affected areas twice daily x 2 weeks on and 2 weeks off, then restart, Disp: 30 g, Rfl: 2    Rivaroxaban (XARELTO) 20 MG Oral Tab, Take 1 tablet (20 mg total) by mouth daily. (Patient taking differently: Take 1 tablet (20 mg total) by mouth daily. Takes nightly also), Disp: 90 tablet, Rfl: 3    Probiotic Product (PROBIOTIC DAILY OR), Take by mouth daily., Disp: , Rfl:     Multiple Vitamin (MULTI-VITAMIN DAILY) Oral Tab, Take by mouth daily.  , Disp: , Rfl:     Misc Natural Products (LUTEIN 20 OR), Take by mouth daily.  , Disp: , Rfl:     Ascorbic Acid (SUPER C COMPLEX OR), Take by mouth daily.  , Disp: , Rfl:     Ernesto Victor MD  8/15/2024

## 2024-08-19 DIAGNOSIS — L03.113 CELLULITIS OF RIGHT UPPER EXTREMITY: ICD-10-CM

## 2024-08-19 RX ORDER — MUPIROCIN CALCIUM 20 MG/G
CREAM TOPICAL
Qty: 15 G | Refills: 1 | Status: SHIPPED | OUTPATIENT
Start: 2024-08-19

## 2024-08-19 NOTE — TELEPHONE ENCOUNTER
A refill request was received for:  Requested Prescriptions     Pending Prescriptions Disp Refills    mupirocin 2 % External Cream 15 g 1     Sig: Use bid       Last refill date:8/15/2024       Last office visit:8/15/2024     Follow up due:  Future Appointments   Date Time Provider Department Center   8/23/2024  1:30 PM Ernesto Victor MD [facility] [address]   9/3/2024  8:00 AM [address]   11/13/2024  2:40 PM Doug Hoyt MD [facility] [place]

## 2024-09-03 ENCOUNTER — HOSPITAL ENCOUNTER (OUTPATIENT)
Dept: ULTRASOUND IMAGING | Age: 89
Discharge: HOME | End: 2024-09-03
Attending: FAMILY MEDICINE
Payer: MEDICARE

## 2024-09-03 DIAGNOSIS — R10.11 RIGHT UPPER QUADRANT ABDOMINAL PAIN: ICD-10-CM

## 2024-09-03 DIAGNOSIS — K90.49 FOOD INTOLERANCE IN ADULT: ICD-10-CM

## 2024-09-03 PROCEDURE — 76700 US EXAM ABDOM COMPLETE: CPT | Performed by: FAMILY MEDICINE

## 2024-09-12 ENCOUNTER — TELEPHONE (OUTPATIENT)
Dept: WOUND CARE | Facility: HOSPITAL | Age: 89
End: 2024-09-12

## 2024-10-25 DIAGNOSIS — F41.9 ANXIETY: ICD-10-CM

## 2024-10-25 DIAGNOSIS — F32.5 MAJOR DEPRESSIVE DISORDER IN REMISSION, UNSPECIFIED WHETHER RECURRENT (HCC): ICD-10-CM

## 2024-10-25 NOTE — TELEPHONE ENCOUNTER
A refill request was received for:  Requested Prescriptions     Pending Prescriptions Disp Refills    escitalopram 10 MG Oral Tab 90 tablet 0     Sig: Take 1 tablet (10 mg total) by mouth daily.    levothyroxine 50 MCG Oral Tab 90 tablet 0     Sig: Take 1 tablet (50 mcg total) by mouth before breakfast.       Last refill date:   7/23/24, 7/16/24    Last office visit: 8/15/2024 ([facility])    Follow up due:  Future Appointments   Date Time Provider Department Center   11/13/2024  2:40 PM Doug Hoyt MD [facility] [place]

## 2024-10-27 RX ORDER — LEVOTHYROXINE SODIUM 50 UG/1
50 TABLET ORAL
Qty: 90 TABLET | Refills: 0 | Status: SHIPPED | OUTPATIENT
Start: 2024-10-27

## 2024-10-27 RX ORDER — ESCITALOPRAM OXALATE 10 MG/1
10 TABLET ORAL DAILY
Qty: 90 TABLET | Refills: 0 | Status: SHIPPED | OUTPATIENT
Start: 2024-10-27

## 2024-10-31 ENCOUNTER — HOSPITAL ENCOUNTER (OUTPATIENT)
Age: 89
Discharge: HOME | End: 2024-10-31
Attending: EMERGENCY MEDICINE
Payer: MEDICARE

## 2024-10-31 ENCOUNTER — TELEPHONE (OUTPATIENT)
Dept: FAMILY MEDICINE CLINIC | Facility: CLINIC | Age: 89
End: 2024-10-31

## 2024-10-31 VITALS
HEART RATE: 88 BPM | BODY MASS INDEX: 26.31 KG/M2 | DIASTOLIC BLOOD PRESSURE: 64 MMHG | WEIGHT: 143 LBS | RESPIRATION RATE: 20 BRPM | HEIGHT: 62 IN | SYSTOLIC BLOOD PRESSURE: 157 MMHG | TEMPERATURE: 98 F | OXYGEN SATURATION: 98 %

## 2024-10-31 DIAGNOSIS — R11.0 NAUSEA: ICD-10-CM

## 2024-10-31 DIAGNOSIS — Z51.89 VISIT FOR WOUND CHECK: ICD-10-CM

## 2024-10-31 DIAGNOSIS — R68.83 CHILLS: Primary | ICD-10-CM

## 2024-10-31 LAB — SARS-COV-2 RNA RESP QL NAA+PROBE: NOT DETECTED

## 2024-10-31 PROCEDURE — 99213 OFFICE O/P EST LOW 20 MIN: CPT

## 2024-10-31 RX ORDER — ONDANSETRON 4 MG/1
4 TABLET, ORALLY DISINTEGRATING ORAL EVERY 6 HOURS PRN
Qty: 10 TABLET | Refills: 0 | Status: SHIPPED | OUTPATIENT
Start: 2024-10-31 | End: 2024-11-07

## 2024-10-31 NOTE — DISCHARGE INSTRUCTIONS
Zofran as directed  Call your dermatologist for further wound care instructions   Return if worse

## 2024-10-31 NOTE — TELEPHONE ENCOUNTER
Pt wants to discuss \"some virus\" she's been fighting for one wk, and now having nausea.  Pls call

## 2024-10-31 NOTE — TELEPHONE ENCOUNTER
Pt states for 5 days has been cold and tired.  Today feeling nauseous and increased gas,   Denies cough, fever, st, diarrhea, vomitting.      Advised [facility] to evaluate. Pt agrees and will proceed.

## 2024-10-31 NOTE — ED INITIAL ASSESSMENT (HPI)
Pt states this is her 5th day is not feeling well, today she woke up feeling nauseated. She was nauseous on the car ride here but fine at the moment. Denies having fevers, pt states her doctors office suggested her taking a Covid test when she cancelled her wound care check for today. Had a cancer surgery on her right shin two weeks ago. Pt states having bodyaches and fatigued.

## 2024-10-31 NOTE — ED PROVIDER NOTES
Patient Seen in: [facility]      History   No chief complaint on file.    Stated Complaint: cold    Subjective:   HPI      89 yo female not feeling well for about five days. She initially had chills but no fever and today is nauseated. No vomiting or diarrhea. Is able to tolerate po. Also feels fatigued. No URI symptoms. No UTI symptoms had a skin cancer removed from her right lower leg and was supposed to go have the dressing changed today. When she called them and told them she was not coming in, they recommended she get a covid test.     Objective:     Past Medical History:    Acute, but ill-defined, cerebrovascular disease    Allergic rhinitis    Anxiety    Arthritis    Back, hips, hands    Atherosclerosis of coronary artery    Back injury    Bone spur of other site    R shoulder    Essential hypertension    Hyperlipidemia    Infection of tooth    Osteoarthritis    Stroke (HCC)    Thyroid disease    TIA (transient ischemic attack)    19              Past Surgical History:   Procedure Laterality Date    Appendectomy      Hysterectomy      partial      3    Skin surgery  11/10/2021    BCC left sandy of jaw mohs by Dr Victor    Tonsillectomy                  Social History     Socioeconomic History    Marital status:    Tobacco Use    Smoking status: Never    Smokeless tobacco: Never   Vaping Use    Vaping status: Never Used   Substance and Sexual Activity    Alcohol use: Never    Drug use: Never   Other Topics Concern    Caffeine Concern Yes     Comment: tea all day long    Exercise Yes     Comment: walking    Seat Belt Yes    Special Diet No    Stress Concern Yes    Weight Concern No     Social Drivers of Health     Financial Resource Strain: Low Risk  (2024)    Financial Resource Strain     Difficulty of Paying Living Expenses: Not very hard     Med Affordability: No   Food Insecurity: No Food Insecurity (7/3/2024)    Food Insecurity     Food Insecurity: Never true    Transportation Needs: No Transportation Needs (7/5/2024)    Transportation Needs     Lack of Transportation: No   Housing Stability: Low Risk  (7/3/2024)    Housing Stability     Housing Instability: No              Review of Systems    Positive for stated complaint: cold  Other systems are as noted in HPI.  Constitutional and vital signs reviewed.      All other systems reviewed and negative except as noted above.    Physical Exam     ED Triage Vitals [10/31/24 1417]   /64   Pulse 88   Resp 20   Temp 98 °F (36.7 °C)   Temp src Temporal   SpO2 98 %   O2 Device None (Room air)       Current Vitals:   Vital Signs  BP: 157/64  Pulse: 88  Resp: 20  Temp: 98 °F (36.7 °C)  Temp src: Temporal    Oxygen Therapy  SpO2: 98 %  O2 Device: None (Room air)        Physical Exam  Vitals and nursing note reviewed.   Constitutional:       Appearance: Normal appearance. She is well-developed.   HENT:      Head: Normocephalic and atraumatic.      Nose: Nose normal.      Mouth/Throat:      Mouth: Mucous membranes are moist.      Pharynx: Oropharynx is clear.   Cardiovascular:      Rate and Rhythm: Normal rate and regular rhythm.      Heart sounds: Normal heart sounds.   Pulmonary:      Effort: Pulmonary effort is normal. No respiratory distress.      Breath sounds: Normal breath sounds.   Abdominal:      General: There is no distension.      Palpations: Abdomen is soft.      Tenderness: There is no abdominal tenderness.   Musculoskeletal:      Cervical back: Neck supple.   Skin:     General: Skin is warm and dry.      Capillary Refill: Capillary refill takes less than 2 seconds.      Comments: Well healing right lower leg surgical wound. No signs of infection.    Neurological:      General: No focal deficit present.      Mental Status: She is alert.      Sensory: No sensory deficit.   Psychiatric:         Mood and Affect: Mood normal.         Behavior: Behavior normal.            ED Course     Labs Reviewed   RAPID SARS-COV-2 BY  PCR - Normal                   MDM           Medical Decision Making  COVID, other viral syndrome, skin infection  in differential. COVID negative. Surgical wound not infected. Could be other viral infection. Appears well. Discharge home on zofran for nausea.     Disposition and Plan     Clinical Impression:  1. Chills    2. Nausea    3. Visit for wound check         Disposition:  Discharge  10/31/2024  3:11 pm    Follow-up:  Ernesto Victor MD  [address] [place] [id number]  [phone]      As needed          Medications Prescribed:  Discharge Medication List as of 10/31/2024  3:17 PM        START taking these medications    Details   ondansetron 4 MG Oral Tablet Dispersible Take 1 tablet (4 mg total) by mouth every 6 (six) hours as needed for Nausea., Normal, Disp-10 tablet, R-0                 Supplementary Documentation:

## 2024-11-13 ENCOUNTER — OFFICE VISIT (OUTPATIENT)
Dept: NEUROLOGY | Facility: CLINIC | Age: 89
End: 2024-11-13
Payer: MEDICARE

## 2024-11-13 VITALS
SYSTOLIC BLOOD PRESSURE: 122 MMHG | DIASTOLIC BLOOD PRESSURE: 62 MMHG | WEIGHT: 147 LBS | BODY MASS INDEX: 27 KG/M2 | HEART RATE: 50 BPM | RESPIRATION RATE: 16 BRPM

## 2024-11-13 DIAGNOSIS — G45.9 TIA (TRANSIENT ISCHEMIC ATTACK): Primary | ICD-10-CM

## 2024-11-13 PROCEDURE — 99214 OFFICE O/P EST MOD 30 MIN: CPT | Performed by: OTHER

## 2024-11-13 PROCEDURE — 1159F MED LIST DOCD IN RCRD: CPT | Performed by: OTHER

## 2024-11-13 PROCEDURE — 3074F SYST BP LT 130 MM HG: CPT | Performed by: OTHER

## 2024-11-13 PROCEDURE — 3078F DIAST BP <80 MM HG: CPT | Performed by: OTHER

## 2024-11-13 NOTE — PATIENT INSTRUCTIONS
Refill policies:    Allow 2-3 business days for refills; controlled substances may take longer.  Contact your pharmacy at least 5 days prior to running out of medication and have them send an electronic request or submit request through the “request refill” option in your [employer] account.  Refills are not addressed on weekends; covering physicians do not authorize routine medications on weekends.  No narcotics or controlled substances are refilled after noon on Fridays or by on call physicians.  By law, narcotics must be electronically prescribed.  A 30 day supply with no refills is the maximum allowed.  If your prescription is due for a refill, you may be due for a follow up appointment.  To best provide you care, patients receiving routine medications need to be seen at least once a year.  Patients receiving narcotic/controlled substance medications need to be seen at least once every 3 months.  In the event that your preferred pharmacy does not have the requested medication in stock (e.g. Backordered), it is your responsibility to find another pharmacy that has the requested medication available.  We will gladly send a new prescription to that pharmacy at your request.    Scheduling Tests:    If your physician has ordered radiology tests such as MRI or CT scans, please contact Central Scheduling at [phone] right away to schedule the test.  Once scheduled, the [facility] Centralized Referral Team will work with your insurance carrier to obtain pre-certification or prior authorization.  Depending on your insurance carrier, approval may take 3-10 days.  It is highly recommended patients assure they have received an authorization before having a test performed.  If test is done without insurance authorization, patient may be responsible for the entire amount billed.      Precertification and Prior Authorizations:  If your physician has recommended that you have a procedure or additional testing performed the [facility]  Centralized Referral Team will contact your insurance carrier to obtain pre-certification or prior authorization.    You are strongly encouraged to contact your insurance carrier to verify that your procedure/test has been approved and is a COVERED benefit.  Although the [facility] Centralized Referral Team does its due diligence, the insurance carrier gives the disclaimer that \"Although the procedure is authorized, this does not guarantee payment.\"    Ultimately the patient is responsible for payment.   Thank you for your understanding in this matter.  Paperwork Completion:  If you require FMLA or disability paperwork for your recovery, please make sure to either drop it off or have it faxed to our office at [phone]. Be sure the form has your name and date of birth on it.  The form will be faxed to our Forms Department and they will complete it for you.  There is a 25$ fee for all forms that need to be filled out.  Please be aware there is a 10-14 day turnaround time.  You will need to sign a release of information (SHELLEY) form if your paperwork does not come with one.  You may call the Forms Department with any questions at [phone].  Their fax number is [phone].

## 2024-11-13 NOTE — PROGRESS NOTES
[facility] Neurology Outpatient Progress Note  Date of service: 11/13/2024    Assessment:     ICD-10-CM    1. TIA (transient ischemic attack)  G45.9    No recurrence on current meds     Plan:  Continue ASA 81mg + Xarelto  Allergic to statin  Stroke education given  Monitor for bleeding  RTC 6 months, can be followed by NP  Pt should go ER for any new or worsening symptoms and contact office     Subjective:   History:  Patient here for a follow-up visit for possible TIA. Since last visit she has been doing, had skin cancer in leg removed, Dr Avalos saw pt in house.  Brief history:  This is a 88-year-old female admitted with a transient episode of confusion 7/4/2024.  Her EEG was unremarkable.  MRI of the brain and CTA of the head and neck did not demonstrate any acute lesions.  No bleeds no stroke.    History/Other:   REVIEW OF SYSTEMS:  A 10-point system was reviewed. Pertinent positives and negatives are noted as above       Current Outpatient Medications:     escitalopram 10 MG Oral Tab, Take 1 tablet (10 mg total) by mouth daily., Disp: 90 tablet, Rfl: 0    levothyroxine 50 MCG Oral Tab, Take 1 tablet (50 mcg total) by mouth before breakfast., Disp: 90 tablet, Rfl: 0    mupirocin 2 % External Cream, Use bid, Disp: 15 g, Rfl: 1    losartan 25 MG Oral Tab, Take 1 tablet (25 mg total) by mouth daily., Disp: , Rfl:     aspirin 81 MG Oral Chew Tab, Chew 1 tablet (81 mg total) by mouth daily., Disp: 90 tablet, Rfl: 3    metoprolol succinate ER 25 MG Oral Tablet 24 Hr, Take 1 tablet (25 mg total) by mouth daily., Disp: , Rfl:     hydrocortisone 2.5 % External Cream, Apply thin layer to affected areas twice daily x 2 weeks on and 2 weeks off, then restart, Disp: 30 g, Rfl: 2    Rivaroxaban (XARELTO) 20 MG Oral Tab, Take 1 tablet (20 mg total) by mouth daily., Disp: 90 tablet, Rfl: 3    Probiotic Product (PROBIOTIC DAILY OR), Take by mouth daily., Disp: , Rfl:     Multiple Vitamin (MULTI-VITAMIN DAILY) Oral Tab, Take by mouth  daily.  , Disp: , Rfl:     Misc Natural Products (LUTEIN 20 OR), Take by mouth daily.  , Disp: , Rfl:     Ascorbic Acid (SUPER C COMPLEX OR), Take by mouth daily.  , Disp: , Rfl:   Allergies:  Allergies[1]  Past Medical History:    Acute, but ill-defined, cerebrovascular disease    Allergic rhinitis    Anxiety    Arthritis    Back, hips, hands    Atherosclerosis of coronary artery    Back injury    Bone spur of other site    R shoulder    Essential hypertension    Hyperlipidemia    Infection of tooth    Osteoarthritis    Stroke (HCC)    Thyroid disease    TIA (transient ischemic attack)    19     Past Surgical History:   Procedure Laterality Date    Appendectomy      Hysterectomy      partial      3    Other surgical history Left     leg, cancer cell removal    Skin surgery  11/10/2021    BCC left sandy of jaw mohs by Dr Victor    Tonsillectomy       Social History:  Social History     Tobacco Use    Smoking status: Never    Smokeless tobacco: Never   Substance Use Topics    Alcohol use: Never     Family History   Problem Relation Age of Onset    Stroke Mother     Heart Disorder Father     Other (Down's Syndrome) Sister     Other (hypotension) Brother 84        orthostatsis due to Parkinson's    Other (hypoglycemia) Brother 81    Other (Parkinson's disease) Brother     Hyperlipidemia Brother     Hyperlipidemia Brother     Other (malabsorption) Daughter     Hyperlipidemia Daughter     Other (cardiac shunt) Daughter         repaired age 17 surgically    Hyperlipidemia Daughter     Other (osteoarthritis) Daughter         hip      Objective:   Neurological Examination:  /62   Pulse 50   Resp 16   Wt 147 lb (66.7 kg)   BMI 26.89 kg/m²   Mental status: A & O X 3  Language: no aphasia  Speech: no dysarthria  CN II-XII: intact   Motor strength: 5/5 all extremities  Tone: normal  Coordination: normal  Sensory: symmetric  Gait: normal    Test reviewed on 2024      Doug Fried\"Lai  MD  Neurology  [facility]  11/13/2024, 3:02 PM  CC: Ernesto Victor MD       [1]   Allergies  Allergen Reactions    Acyclovir OTHER (SEE COMMENTS)    Amoxicillin     Atorvastatin MYALGIA and OTHER (SEE COMMENTS)    Azithromycin OTHER (SEE COMMENTS)    Brinzolamide OTHER (SEE COMMENTS)    Brompheniramine-Phenylephrine OTHER (SEE COMMENTS)    Cefaclor ANAPHYLAXIS     Headache    Headache  Headache    Cephalexin OTHER (SEE COMMENTS)    Cinoxacin OTHER (SEE COMMENTS)    Clarithromycin OTHER (SEE COMMENTS)    Cyclobenzaprine OTHER (SEE COMMENTS)    Darvon [Propoxyphene]     Diphenoxylate-Atropine OTHER (SEE COMMENTS)    Doxycycline OTHER (SEE COMMENTS)    Epinephrine OTHER (SEE COMMENTS)    Ibuprofen OTHER (SEE COMMENTS)    Latex OTHER (SEE COMMENTS)    Loperamide OTHER (SEE COMMENTS)    Loracarbef OTHER (SEE COMMENTS)    Mayonaise     Methylprednisolone OTHER (SEE COMMENTS)    Metronidazole OTHER (SEE COMMENTS)     Flagyl  Flagyl  Flagyl    Miconazole OTHER (SEE COMMENTS)    Nitrofurantoin OTHER (SEE COMMENTS)    Olive Oil OTHER (SEE COMMENTS)    Paxil [Paroxetine]     Pyridin [Phenazopyridine]     Rosuvastatin OTHER (SEE COMMENTS)     Agitation   Agitation   Agitation     Sulfamethoxazole      Throat swelling    Synalgos Dc     Donnatal [Belladonna Alk-Phenobarbital]     Gantrisin [Sulfisoxazole]     Sudafed [Pseudoephedrine]     Trazodone     Ampicillin     Bactrim [Sulfamethoxazole W/Trimethoprim]     Dimetapp Cold-Allergy [Brompheniramine-Phenylephrine]     Effexor [Venlafaxine]      Headache      Mycostatin [Nystatin]     Radiology Contrast Iodinated Dyes RASH     MRI contrast per patient

## 2024-11-26 ENCOUNTER — PATIENT MESSAGE (OUTPATIENT)
Dept: FAMILY MEDICINE CLINIC | Facility: CLINIC | Age: 89
End: 2024-11-26

## 2024-11-26 NOTE — TELEPHONE ENCOUNTER
Patient has history of hemorrhoids. She takes stool softeners prn. This morning had 2 bowel movements with active red blood in toilet. Denies abdominal pain. No anal/rectal pain. Has history of constipation. No further episodes.  States she applies small amount Aquaphor to rectal area for comfort. She is on blood thinners.  Daily aspirin and Xarelto. Instructed to seek medical attention should this persist and/or should symptoms change or worsen such as dizziness, abdominal pain etc. She agrees with plan and go ahead  and schedule follow up with provider. .:

## 2024-11-29 NOTE — TELEPHONE ENCOUNTER
Spoke to patient and she denies any rectal bleeding at present.Patient denies any rectal pain but states she has history of hemorrhoids.  Patient states she would like to schedule appointment with Astrid Chavez.  Patient scheduled appointment with Astrid for 12/16/24.  Patient declined sooner appointment. Patient instructed if symptoms return or any worsening symptoms to go to ER for evaluation.  Patient verbalized understanding.

## 2024-12-02 ENCOUNTER — OFFICE VISIT (OUTPATIENT)
Dept: FAMILY MEDICINE CLINIC | Facility: CLINIC | Age: 89
End: 2024-12-02
Payer: MEDICARE

## 2024-12-02 VITALS
SYSTOLIC BLOOD PRESSURE: 141 MMHG | WEIGHT: 147 LBS | HEART RATE: 93 BPM | DIASTOLIC BLOOD PRESSURE: 64 MMHG | BODY MASS INDEX: 27.05 KG/M2 | OXYGEN SATURATION: 98 % | TEMPERATURE: 99 F | RESPIRATION RATE: 16 BRPM | HEIGHT: 62 IN

## 2024-12-02 DIAGNOSIS — J02.9 SORE THROAT: ICD-10-CM

## 2024-12-02 DIAGNOSIS — R09.82 POST-NASAL DRIP: Primary | ICD-10-CM

## 2024-12-02 PROCEDURE — 1160F RVW MEDS BY RX/DR IN RCRD: CPT | Performed by: NURSE PRACTITIONER

## 2024-12-02 PROCEDURE — 3078F DIAST BP <80 MM HG: CPT | Performed by: NURSE PRACTITIONER

## 2024-12-02 PROCEDURE — 3008F BODY MASS INDEX DOCD: CPT | Performed by: NURSE PRACTITIONER

## 2024-12-02 PROCEDURE — 1159F MED LIST DOCD IN RCRD: CPT | Performed by: NURSE PRACTITIONER

## 2024-12-02 PROCEDURE — 99213 OFFICE O/P EST LOW 20 MIN: CPT | Performed by: NURSE PRACTITIONER

## 2024-12-02 PROCEDURE — 3077F SYST BP >= 140 MM HG: CPT | Performed by: NURSE PRACTITIONER

## 2024-12-02 NOTE — PROGRESS NOTES
CHIEF COMPLAINT:     Chief Complaint   Patient presents with    Sore Throat     Sore throat on and off x1day         HPI:   Bella Kirby is a 89 year old female presents to clinic with complaint of sore throat. Patient has had for 1 days. Symptoms have been on and off since onset.  Patient reports following associated symptoms: congestion. Has no history of strep throat. no strep pharyngitis exposure.  Treating symptoms with nothing. Pt states the sore throat is on and off on right side only.     Current Outpatient Medications   Medication Sig Dispense Refill    escitalopram 10 MG Oral Tab Take 1 tablet (10 mg total) by mouth daily. 90 tablet 0    levothyroxine 50 MCG Oral Tab Take 1 tablet (50 mcg total) by mouth before breakfast. 90 tablet 0    mupirocin 2 % External Cream Use bid 15 g 1    losartan 25 MG Oral Tab Take 1 tablet (25 mg total) by mouth daily.      aspirin 81 MG Oral Chew Tab Chew 1 tablet (81 mg total) by mouth daily. 90 tablet 3    metoprolol succinate ER 25 MG Oral Tablet 24 Hr Take 1 tablet (25 mg total) by mouth daily.      hydrocortisone 2.5 % External Cream Apply thin layer to affected areas twice daily x 2 weeks on and 2 weeks off, then restart 30 g 2    Rivaroxaban (XARELTO) 20 MG Oral Tab Take 1 tablet (20 mg total) by mouth daily. 90 tablet 3    Probiotic Product (PROBIOTIC DAILY OR) Take by mouth daily.      Multiple Vitamin (MULTI-VITAMIN DAILY) Oral Tab Take by mouth daily.        Misc Natural Products (LUTEIN 20 OR) Take by mouth daily.        Ascorbic Acid (SUPER C COMPLEX OR) Take by mouth daily.          Past Medical History:    Acute, but ill-defined, cerebrovascular disease    Allergic rhinitis    Anxiety    Arthritis    Back, hips, hands    Atherosclerosis of coronary artery    Back injury    Bone spur of other site    R shoulder    Essential hypertension    Hyperlipidemia    Infection of tooth    Osteoarthritis    Stroke (HCC)    Thyroid disease    TIA (transient ischemic  attack)    6/23/19      Social History:  Social History     Socioeconomic History    Marital status:    Tobacco Use    Smoking status: Never    Smokeless tobacco: Never   Vaping Use    Vaping status: Never Used   Substance and Sexual Activity    Alcohol use: Never    Drug use: Never   Other Topics Concern    Caffeine Concern Yes     Comment: tea all day long    Exercise Yes     Comment: walking    Seat Belt Yes    Special Diet No    Stress Concern Yes    Weight Concern No     Social Drivers of Health     Financial Resource Strain: Low Risk  (7/5/2024)    Financial Resource Strain     Difficulty of Paying Living Expenses: Not very hard     Med Affordability: No   Food Insecurity: No Food Insecurity (7/3/2024)    Food Insecurity     Food Insecurity: Never true   Transportation Needs: No Transportation Needs (7/5/2024)    Transportation Needs     Lack of Transportation: No   Housing Stability: Low Risk  (7/3/2024)    Housing Stability     Housing Instability: No        REVIEW OF SYSTEMS:   GENERAL HEALTH: normal appetite  SKIN: denies any unusual skin lesions or rashes  HEENT: denies ear pain, See HPI  RESPIRATORY: denies shortness of breath or wheezing  CARDIOVASCULAR: denies chest pain or palpitations   GI: denies vomiting or diarrhea  NEURO: denies dizziness or lightheadedness    EXAM:   /64   Pulse 93   Temp 98.9 °F (37.2 °C) (Temporal)   Resp 16   Ht 5' 2\" (1.575 m)   Wt 147 lb (66.7 kg)   SpO2 98%   BMI 26.89 kg/m²   GENERAL: well developed, well nourished,in no apparent distress  SKIN: no rashes,no suspicious lesions  HEAD: atraumatic, normocephalic  EYES: conjunctiva clear, EOM intact  EARS: TM's clear, non-injected, no bulging, retraction, or fluid bilaterally  NOSE: nostrils patent, clear nasal discharge, nasal mucosa pink  THROAT: oral mucosa pink, moist. Posterior pharynx erythematous and injected. no exudates. Tonsils 0/4.  Breath not malodorous   NECK: supple  LUNGS: clear to  auscultation bilaterally, no wheezes or rhonchi. Breathing is non labored.  CARDIO: RRR without murmur  GI: good BS's,no masses, hepatosplenomegaly, or tenderness on direct palpation  EXTREMITIES: no cyanosis, clubbing or edema  LYMPH: no anterior cervical. no submandibular lymphadenopathy.  No posterior cervical or occipital lymphadenopathy.    No results found for this or any previous visit (from the past 24 hours).      ASSESSMENT AND PLAN:   Assessment: 1. Post nasal drip                         2. Sore throat: right side only    Plan:   Educated on PND  Pt declined strep testing  Educated on supportive measures: ibuprofen/tylenol, hydration,claritin  Educated on s/s of worsening sx and when to seek higher level of care  Follow up with pcp if not improving

## 2025-01-08 ENCOUNTER — OFFICE VISIT (OUTPATIENT)
Dept: FAMILY MEDICINE CLINIC | Facility: CLINIC | Age: OVER 89
End: 2025-01-08
Payer: MEDICARE

## 2025-01-08 VITALS
HEART RATE: 81 BPM | OXYGEN SATURATION: 97 % | SYSTOLIC BLOOD PRESSURE: 130 MMHG | RESPIRATION RATE: 15 BRPM | DIASTOLIC BLOOD PRESSURE: 70 MMHG | BODY MASS INDEX: 27 KG/M2 | HEIGHT: 62 IN

## 2025-01-08 DIAGNOSIS — K60.2 ANAL FISSURE: Primary | ICD-10-CM

## 2025-01-08 PROCEDURE — 3075F SYST BP GE 130 - 139MM HG: CPT | Performed by: PHYSICIAN ASSISTANT

## 2025-01-08 PROCEDURE — 1160F RVW MEDS BY RX/DR IN RCRD: CPT | Performed by: PHYSICIAN ASSISTANT

## 2025-01-08 PROCEDURE — 99213 OFFICE O/P EST LOW 20 MIN: CPT | Performed by: PHYSICIAN ASSISTANT

## 2025-01-08 PROCEDURE — 1159F MED LIST DOCD IN RCRD: CPT | Performed by: PHYSICIAN ASSISTANT

## 2025-01-08 PROCEDURE — 3078F DIAST BP <80 MM HG: CPT | Performed by: PHYSICIAN ASSISTANT

## 2025-01-08 RX ORDER — NITROGLYCERIN 4 MG/G
1.5 OINTMENT RECTAL EVERY 12 HOURS
Qty: 30 G | Refills: 0 | Status: SHIPPED | OUTPATIENT
Start: 2025-01-08

## 2025-01-08 NOTE — PROGRESS NOTES
Subjective:   Patient ID: Bella Kirby is a 89 year old female.    HPI  Patient presents to discuss a couple concerns:    She had an episode of rectal bleeding  Has h/o hemorrhoids  Bleeding happened around Thanksgiving just the one time  States it was a lot of blood and bright red  She states a hemorrhoid may have \"broken\"  States the skin of the area is always very dry, she applies aquaphor to the site  She uses wet wipes to clean up  States the skin is cracked  She takes ASA and xarelto  She has not had bleeding since that time    She wonders if she needs to continue ASA and xarelto  Also states she takes vitamin D/K2        History/Other:   Review of Systems   Constitutional:  Negative for chills, diaphoresis and fever.   Eyes:  Negative for visual disturbance.   Respiratory:  Negative for chest tightness and shortness of breath.    Cardiovascular:  Negative for chest pain, palpitations and leg swelling.   Gastrointestinal:  Positive for anal bleeding.        Hemorrhoids, h/o anal fissure   Neurological:  Negative for dizziness, light-headedness and headaches.     Current Outpatient Medications   Medication Sig Dispense Refill    escitalopram 10 MG Oral Tab Take 1 tablet (10 mg total) by mouth daily. 90 tablet 0    levothyroxine 50 MCG Oral Tab Take 1 tablet (50 mcg total) by mouth before breakfast. 90 tablet 0    mupirocin 2 % External Cream Use bid 15 g 1    losartan 25 MG Oral Tab Take 1 tablet (25 mg total) by mouth daily.      aspirin 81 MG Oral Chew Tab Chew 1 tablet (81 mg total) by mouth daily. 90 tablet 3    metoprolol succinate ER 25 MG Oral Tablet 24 Hr Take 1 tablet (25 mg total) by mouth daily.      hydrocortisone 2.5 % External Cream Apply thin layer to affected areas twice daily x 2 weeks on and 2 weeks off, then restart 30 g 2    Rivaroxaban (XARELTO) 20 MG Oral Tab Take 1 tablet (20 mg total) by mouth daily. 90 tablet 3    Probiotic Product (PROBIOTIC DAILY OR) Take by mouth daily.       Multiple Vitamin (MULTI-VITAMIN DAILY) Oral Tab Take by mouth daily.        Misc Natural Products (LUTEIN 20 OR) Take by mouth daily.        Ascorbic Acid (SUPER C COMPLEX OR) Take by mouth daily.         Allergies:Allergies[1]    Objective:   Physical Exam  Vitals and nursing note reviewed.   Constitutional:       Appearance: Normal appearance.   Genitourinary:     Rectum: Anal fissure present. No tenderness or external hemorrhoid.   Neurological:      Mental Status: She is alert.         Assessment & Plan:   1. Anal fissure  Patient had had one episode of rectal bleeding about 1.5 months ago and on long term anticoagulation. On exam she has maceration of the skin of the anus and small fissure. Recommend stopping aquaphor for now, continue using flushable wipes prn, start trial of topical nitroglycerin as directed. Follow up if symptoms worsen or do not improve with these measures.   - nitroglycerin 0.4 % Rectal Ointment; Place 1.5 g rectally every 12 (twelve) hours.  Dispense: 30 g; Refill: 0             [1]   Allergies  Allergen Reactions    Acyclovir OTHER (SEE COMMENTS)    Amoxicillin     Atorvastatin MYALGIA and OTHER (SEE COMMENTS)    Azithromycin OTHER (SEE COMMENTS)    Brinzolamide OTHER (SEE COMMENTS)    Brompheniramine-Phenylephrine OTHER (SEE COMMENTS)    Cefaclor ANAPHYLAXIS     Headache    Headache  Headache    Cephalexin OTHER (SEE COMMENTS)    Cinoxacin OTHER (SEE COMMENTS)    Clarithromycin OTHER (SEE COMMENTS)    Cyclobenzaprine OTHER (SEE COMMENTS)    Darvon [Propoxyphene]     Diphenoxylate-Atropine OTHER (SEE COMMENTS)    Doxycycline OTHER (SEE COMMENTS)    Epinephrine OTHER (SEE COMMENTS)    Ibuprofen OTHER (SEE COMMENTS)    Latex OTHER (SEE COMMENTS)    Loperamide OTHER (SEE COMMENTS)    Loracarbef OTHER (SEE COMMENTS)    Mayonaise     Methylprednisolone OTHER (SEE COMMENTS)    Metronidazole OTHER (SEE COMMENTS)     Flagyl  Flagyl  Flagyl    Miconazole OTHER (SEE COMMENTS)    Nitrofurantoin  OTHER (SEE COMMENTS)    Olive Oil OTHER (SEE COMMENTS)    Paxil [Paroxetine]     Pyridin [Phenazopyridine]     Rosuvastatin OTHER (SEE COMMENTS)     Agitation   Agitation   Agitation     Sulfamethoxazole      Throat swelling    Synalgos Dc     Donnatal [Belladonna Alk-Phenobarbital]     Gantrisin [Sulfisoxazole]     Sudafed [Pseudoephedrine]     Trazodone     Ampicillin     Bactrim [Sulfamethoxazole W/Trimethoprim]     Dimetapp Cold-Allergy [Brompheniramine-Phenylephrine]     Effexor [Venlafaxine]      Headache      Mycostatin [Nystatin]     Radiology Contrast Iodinated Dyes RASH     MRI contrast per patient

## 2025-01-20 ENCOUNTER — TELEPHONE (OUTPATIENT)
Dept: FAMILY MEDICINE CLINIC | Facility: CLINIC | Age: OVER 89
End: 2025-01-20

## 2025-01-20 NOTE — TELEPHONE ENCOUNTER
Pt states 2 weeks ago, was walking fast with some friends and pulled her left groin muscle.  Since she feels she is not walking straight due to discomfort.  She now is having sciatic pain.  Taking advil sparingly.      Wants to try PT again,  was doing pt in 2023 for back pain,     Has appt 2/5/25 for supervisit,   okay for order?

## 2025-01-20 NOTE — TELEPHONE ENCOUNTER
Patient is calling to report pain and says she has groin muscle and siatic nerve pain.. & because she is not walking straight it is aggravating it. She is requesting a referral for PT. Please call her back.

## 2025-01-20 NOTE — TELEPHONE ENCOUNTER
Pt calling to let Astrid know she had bleeding on Thursday and Saturday.     She did not on Friday, Sunday or today

## 2025-01-20 NOTE — TELEPHONE ENCOUNTER
Noted, thank you. If symptoms are resolved at this point we can continue to monitor. However if symptoms recur we should have her see GI/colorectal surgery to determine need for testing. If she develops symptoms of anemia then we can check labs.

## 2025-01-20 NOTE — TELEPHONE ENCOUNTER
Fyi update from 1/8/25 office visit:  Pt states she had a lot of rectal bleeding on Saturday, however has stopped, she discontinued Nitroglycerin.  No bleeding since. Slight \"hemorrhoid type\" pain.

## 2025-01-22 DIAGNOSIS — F32.5 MAJOR DEPRESSIVE DISORDER IN REMISSION, UNSPECIFIED WHETHER RECURRENT: ICD-10-CM

## 2025-01-22 DIAGNOSIS — F41.9 ANXIETY: ICD-10-CM

## 2025-01-23 RX ORDER — ESCITALOPRAM OXALATE 10 MG/1
10 TABLET ORAL DAILY
Qty: 90 TABLET | Refills: 0 | Status: SHIPPED | OUTPATIENT
Start: 2025-01-23

## 2025-01-23 RX ORDER — LEVOTHYROXINE SODIUM 50 UG/1
50 TABLET ORAL
Qty: 90 TABLET | Refills: 0 | Status: SHIPPED | OUTPATIENT
Start: 2025-01-23

## 2025-01-24 ENCOUNTER — TELEPHONE (OUTPATIENT)
Dept: PHYSICAL THERAPY | Facility: HOSPITAL | Age: OVER 89
End: 2025-01-24

## 2025-01-28 ENCOUNTER — OFFICE VISIT (OUTPATIENT)
Dept: PHYSICAL THERAPY | Age: OVER 89
End: 2025-01-28
Attending: PHYSICIAN ASSISTANT
Payer: MEDICARE

## 2025-01-28 DIAGNOSIS — M54.30 SCIATIC NERVE PAIN, UNSPECIFIED LATERALITY: ICD-10-CM

## 2025-01-28 DIAGNOSIS — R10.30 INGUINAL PAIN, UNSPECIFIED LATERALITY: Primary | ICD-10-CM

## 2025-01-28 PROCEDURE — 97162 PT EVAL MOD COMPLEX 30 MIN: CPT

## 2025-01-28 PROCEDURE — 97110 THERAPEUTIC EXERCISES: CPT

## 2025-01-28 NOTE — PROGRESS NOTES
LOWER EXTREMITY EVALUATION:     Diagnosis:   Inguinal pain, unspecified laterality (R10.30)  Sciatic nerve pain, unspecified laterality (M54.30)      Referring Provider: Astrid Chvaez  Date of Evaluation:    1/28/2025    Precautions:  None Next MD visit:   none scheduled  Date of Surgery: n/a     PATIENT SUMMARY   Bella Kirby is a 89 year old female who presents to therapy today with complaints of L groin and lateral hip pain that started about 3 weeks ago. Pt describes feelings of numbness and tingling/\"sciatic\" nerve paresthesias only effecting her LLE. Pt denies trauma or low back pain at this time. Pt states her symptoms are worse at night and get worse if walking/standing for prolonged periods of time.   Pt describes pain level current 5/10, at best 5/10, at worst 8/10.   Current functional limitations include difficulty and pain onset w/ walking/standing for prolonged periods of time as well as with completion of transitional movements.     Bella describes prior level of function independent w/o difficulty. Pt goals include decrease L sided hip and leg pain in order to improve functional mobility.  Past medical history was reviewed with Bella. Significant findings include  has a past medical history of Acute, but ill-defined, cerebrovascular disease, Allergic rhinitis, Anxiety, Arthritis, Atherosclerosis of coronary artery, Back injury (1980), Bone spur of other site, Essential hypertension, Hyperlipidemia, Infection of tooth (06/01/2024), Osteoarthritis, Stroke ([facility]), Thyroid disease, and TIA (transient ischemic attack).     ASSESSMENT  Bella presents to physical therapy evaluation with primary c/o L groin and lateral hip pain that started about 3 weeks ago. The results of the objective tests and measures show weakness of hip and core musculature bilaterally, soft tissue restrictions and Trps of posterior chain, glute and hip flexor musculature on the L, and limited hip AROM on the L.  Functional  deficits include but are not limited to difficulty and pain onset with walking/standing for prolonged periods of time as well as with transitional movements due to hip pain.  Signs and symptoms are consistent with diagnosis of L groin and lateral hip pain. Pt and PT discussed evaluation findings, pathology, POC and HEP.  Pt voiced understanding and performs HEP correctly without reported pain. Skilled Physical Therapy is medically necessary to address the above impairments and reach functional goals.     OBJECTIVE:   Observation: Slight forward flexed trunk posture  Palpation: Tenderness with palpation of glute musculature, quads and iliopsoas musculature on the L  Sensation: intact and equal bilaterally    AROM: (* denotes performed with pain)  Hip   Flexion: R 120; *L 100  Extension: R 10; *L 3  Abduction: R 20; L 20  ER: R 30;* L 30  IR: R 20; *L 10     Accessory motion: limited lumbar motion     Flexibility:  Hip Flexor: R Mod, L Severe  Hamstrings: R Severe; L Severe  Piriformis: R Mod; L Mod  Quads: R Mod; L Severe  Gastroc-soleus: R Severe; L Severe    Strength/MMT: (* denotes performed with pain)  Hip Knee   Flexion: R 4/5; L 4-/5  Extension: R 4/5; L 4-/5  Abduction: R 4/5; L 4-/5  ER: R 4/5; L 4-/5  IR: R 4/5; L 4-/5 Flexion: R 4+/5; L 4+/5  Extension: R 5/5; L 5/5        Hip Special Tests:  Scour Test: R +, L +  Julius Test: R +, L +  Torie Test: R -, L +  Criselda's Test: R +, L +  Mobility:  5xSTST:12 sec  6 min walk test: 890 ft    Gait: pt ambulates on level ground with antalgia, decreased step length L, decreased stance phase L, decreased hip/knee flex or ext L, and decreased arm swing  Balance: SLS: R 20 sec, L 10 sec    Today’s Treatment and Response:   Pt education was provided on exam findings, treatment diagnosis, treatment plan, expectations, and prognosis. Pt was also provided recommendations for activity modifications, possible soreness after evaluation, modalities as needed [ice/heat],  ergonomics, pain science education , detrimental fear avoidance behaviors, importance of remaining active, and strategies to reduce fall risk at home.  Patient was instructed in and issued a HEP for: Access Code: [email or web address]  URL: [email or web address]  Date: 01/28/2025  Prepared by: Darrion French    Exercises  - Hooklying Single Knee to Chest Stretch  - 4-5 x weekly - 2-3 sets - 10 reps  - Hip Flexor Stretch on Step  - 5-6 x weekly - 3 sets - 30-40 hold  - Standing Hamstring Stretch with Step  - 1 x daily - 5-6 x weekly - 3 sets - 10 reps  - Supine Sciatic Nerve Glide  - 5-6 x weekly - 2 sets - 10 reps  - Bent Knee Fallouts  - 3-4 x weekly - 2-3 sets - 10 reps  - Standing Hip Abduction with Counter Support  - 3 x weekly - 3 sets - 10 reps    Charges: PT Eval Moderate Complexity, 23      Total Timed Treatment: 22 min     Total Treatment Time: 45 min       PLAN OF CARE:    Goals: (to be met in 8 visits)   Not Met Progress Toward Partially Met Met   Pt will have improved hip AROM Flex to 120 deg to be able to don/doff shoes and perform sit to stand transfers without difficulty. [] [] [] []   Pt will improve hip ABD and ER strength to 4+/5 to increase ease with standing and walking. [] [] [] []   Pt will be able to squat to  light objects around the house with <2/10 hip pain. [] [] [] []   Pt will improve functional hip strength to report ability to ascend/descend 1 flight of stairs reciprocally without use of handrail. [] [] [] []   Pt will demonstrate improved SLS to >30 seconds ROYA to promote safety and decrease risk of falls on uneven surfaces such as grass and gravel. [] [] [] []   Pt will perform 5xSTST in <11 seconds, demonstrating improved gait speed and functional mobility for improved community ambulation. [] [] [] []   Pt will be independent and compliant with comprehensive HEP to maintain progress achieved in PT. [] [] [] []       Frequency / Duration: Patient will be seen for 2  x/week or a total of 8 visits over a 90 day period. Treatment will include: Gait training, Manual Therapy, Neuromuscular Re-education, Self-Care Home Management, Therapeutic Activities, Therapeutic Exercise, Home Exercise Program instruction, and Modalities to include: as needed for pain modulation    Education or treatment limitation: None  Rehab Potential:good    LEFS Score  LEFS Score: (Proxy-Rptd) 22.5 % (1/28/2025  2:42 PM)      Patient/Family/Caregiver was advised of these findings, precautions, and treatment options and has agreed to actively participate in planning and for this course of care.    Thank you for your referral. Please co-sign or sign and return this letter via fax as soon as possible to [phone]. If you have any questions, please contact me at Dept: [phone]    Sincerely,  Electronically signed by therapist: Darrion French PT  Physician's certification required: Yes  I certify the need for these services furnished under this plan of treatment and while under my care.    X___________________________________________________ Date____________________    Certification From: 1/28/2025  To:4/28/2025

## 2025-02-03 ENCOUNTER — TELEPHONE (OUTPATIENT)
Dept: PHYSICAL THERAPY | Facility: HOSPITAL | Age: OVER 89
End: 2025-02-03

## 2025-02-03 ENCOUNTER — APPOINTMENT (OUTPATIENT)
Dept: PHYSICAL THERAPY | Age: OVER 89
End: 2025-02-03
Attending: PHYSICIAN ASSISTANT
Payer: MEDICARE

## 2025-02-03 ENCOUNTER — TELEPHONE (OUTPATIENT)
Dept: FAMILY MEDICINE CLINIC | Facility: CLINIC | Age: OVER 89
End: 2025-02-03

## 2025-02-03 NOTE — TELEPHONE ENCOUNTER
Patient paged regarding elevated BP of 190/99 this morning. This was prior to taking her losartan 25 mg. She activated her lfe alert and EMS came. Advised ER but she declined. She is asymptomatic. Advised she take her losartan now and recheck BP in 1-2 hours. If over 150/90 then take metoprolol 25 mg pill. If still elevated or if she becomes symptomatic, go to ER. Otherwise will follow up in office in 2 days.

## 2025-02-05 ENCOUNTER — HOSPITAL ENCOUNTER (OUTPATIENT)
Dept: GENERAL RADIOLOGY | Age: OVER 89
Discharge: HOME | End: 2025-02-05
Attending: PHYSICIAN ASSISTANT
Payer: MEDICARE

## 2025-02-05 ENCOUNTER — LAB ENCOUNTER (OUTPATIENT)
Dept: LAB | Age: OVER 89
End: 2025-02-05
Attending: PHYSICIAN ASSISTANT
Payer: MEDICARE

## 2025-02-05 ENCOUNTER — EKG ENCOUNTER (OUTPATIENT)
Dept: LAB | Age: OVER 89
End: 2025-02-05
Attending: PHYSICIAN ASSISTANT
Payer: MEDICARE

## 2025-02-05 ENCOUNTER — OFFICE VISIT (OUTPATIENT)
Dept: FAMILY MEDICINE CLINIC | Facility: CLINIC | Age: OVER 89
End: 2025-02-05
Payer: MEDICARE

## 2025-02-05 VITALS
SYSTOLIC BLOOD PRESSURE: 180 MMHG | RESPIRATION RATE: 15 BRPM | OXYGEN SATURATION: 97 % | HEART RATE: 82 BPM | HEIGHT: 62 IN | BODY MASS INDEX: 27 KG/M2 | DIASTOLIC BLOOD PRESSURE: 82 MMHG

## 2025-02-05 DIAGNOSIS — R53.1 GENERALIZED WEAKNESS: ICD-10-CM

## 2025-02-05 DIAGNOSIS — R53.1 GENERALIZED WEAKNESS: Primary | ICD-10-CM

## 2025-02-05 DIAGNOSIS — R00.2 PALPITATIONS: ICD-10-CM

## 2025-02-05 DIAGNOSIS — I10 UNCONTROLLED HYPERTENSION: ICD-10-CM

## 2025-02-05 LAB
ALBUMIN SERPL-MCNC: 4.3 G/DL (ref 3.2–4.8)
ALBUMIN/GLOB SERPL: 1.7 {RATIO} (ref 1–2)
ALP LIVER SERPL-CCNC: 68 U/L
ALT SERPL-CCNC: 16 U/L
ANION GAP SERPL CALC-SCNC: 8 MMOL/L (ref 0–18)
AST SERPL-CCNC: 22 U/L (ref ?–34)
ATRIAL RATE: 78 BPM
BASOPHILS # BLD AUTO: 0.05 X10(3) UL (ref 0–0.2)
BASOPHILS NFR BLD AUTO: 0.8 %
BILIRUB SERPL-MCNC: 0.3 MG/DL (ref 0.2–1.1)
BILIRUB UR QL STRIP.AUTO: NEGATIVE
BUN BLD-MCNC: 12 MG/DL (ref 9–23)
CALCIUM BLD-MCNC: 9.7 MG/DL (ref 8.7–10.6)
CHLORIDE SERPL-SCNC: 104 MMOL/L (ref 98–112)
CLARITY UR REFRACT.AUTO: CLEAR
CO2 SERPL-SCNC: 27 MMOL/L (ref 21–32)
COLOR UR AUTO: COLORLESS
CREAT BLD-MCNC: 0.81 MG/DL
EGFRCR SERPLBLD CKD-EPI 2021: 69 ML/MIN/1.73M2 (ref 60–?)
EOSINOPHIL # BLD AUTO: 0.19 X10(3) UL (ref 0–0.7)
EOSINOPHIL NFR BLD AUTO: 3.2 %
ERYTHROCYTE [DISTWIDTH] IN BLOOD BY AUTOMATED COUNT: 12.5 %
EST. AVERAGE GLUCOSE BLD GHB EST-MCNC: 123 MG/DL (ref 68–126)
FASTING STATUS PATIENT QL REPORTED: NO
GLOBULIN PLAS-MCNC: 2.5 G/DL (ref 2–3.5)
GLUCOSE BLD-MCNC: 100 MG/DL (ref 70–99)
GLUCOSE UR STRIP.AUTO-MCNC: NORMAL MG/DL
HBA1C MFR BLD: 5.9 % (ref ?–5.7)
HCT VFR BLD AUTO: 38.5 %
HGB BLD-MCNC: 12.6 G/DL
IMM GRANULOCYTES # BLD AUTO: 0.02 X10(3) UL (ref 0–1)
IMM GRANULOCYTES NFR BLD: 0.3 %
KETONES UR STRIP.AUTO-MCNC: NEGATIVE MG/DL
LEUKOCYTE ESTERASE UR QL STRIP.AUTO: 75
LYMPHOCYTES # BLD AUTO: 1.46 X10(3) UL (ref 1–4)
LYMPHOCYTES NFR BLD AUTO: 24.5 %
MCH RBC QN AUTO: 28.4 PG (ref 26–34)
MCHC RBC AUTO-ENTMCNC: 32.7 G/DL (ref 31–37)
MCV RBC AUTO: 86.9 FL
MONOCYTES # BLD AUTO: 0.47 X10(3) UL (ref 0.1–1)
MONOCYTES NFR BLD AUTO: 7.9 %
NEUTROPHILS # BLD AUTO: 3.77 X10 (3) UL (ref 1.5–7.7)
NEUTROPHILS # BLD AUTO: 3.77 X10(3) UL (ref 1.5–7.7)
NEUTROPHILS NFR BLD AUTO: 63.3 %
NITRITE UR QL STRIP.AUTO: NEGATIVE
NT-PROBNP SERPL-MCNC: 170 PG/ML (ref ?–450)
OSMOLALITY SERPL CALC.SUM OF ELEC: 288 MOSM/KG (ref 275–295)
P AXIS: 59 DEGREES
P-R INTERVAL: 146 MS
PH UR STRIP.AUTO: 6.5 [PH] (ref 5–8)
PLATELET # BLD AUTO: 193 10(3)UL (ref 150–450)
POTASSIUM SERPL-SCNC: 4.5 MMOL/L (ref 3.5–5.1)
PROT SERPL-MCNC: 6.8 G/DL (ref 5.7–8.2)
PROT UR STRIP.AUTO-MCNC: NEGATIVE MG/DL
Q-T INTERVAL: 366 MS
QRS DURATION: 80 MS
QTC CALCULATION (BEZET): 417 MS
R AXIS: 12 DEGREES
RBC # BLD AUTO: 4.43 X10(6)UL
RBC UR QL AUTO: NEGATIVE
SODIUM SERPL-SCNC: 139 MMOL/L (ref 136–145)
SP GR UR STRIP.AUTO: <1.005 (ref 1–1.03)
T AXIS: 41 DEGREES
UROBILINOGEN UR STRIP.AUTO-MCNC: NORMAL MG/DL
VENTRICULAR RATE: 78 BPM
WBC # BLD AUTO: 6 X10(3) UL (ref 4–11)

## 2025-02-05 PROCEDURE — 80053 COMPREHEN METABOLIC PANEL: CPT

## 2025-02-05 PROCEDURE — 71046 X-RAY EXAM CHEST 2 VIEWS: CPT | Performed by: PHYSICIAN ASSISTANT

## 2025-02-05 PROCEDURE — 85025 COMPLETE CBC W/AUTO DIFF WBC: CPT

## 2025-02-05 PROCEDURE — 87086 URINE CULTURE/COLONY COUNT: CPT

## 2025-02-05 PROCEDURE — 3077F SYST BP >= 140 MM HG: CPT | Performed by: PHYSICIAN ASSISTANT

## 2025-02-05 PROCEDURE — 3079F DIAST BP 80-89 MM HG: CPT | Performed by: PHYSICIAN ASSISTANT

## 2025-02-05 PROCEDURE — 83036 HEMOGLOBIN GLYCOSYLATED A1C: CPT

## 2025-02-05 PROCEDURE — 1125F AMNT PAIN NOTED PAIN PRSNT: CPT | Performed by: PHYSICIAN ASSISTANT

## 2025-02-05 PROCEDURE — 93010 ELECTROCARDIOGRAM REPORT: CPT | Performed by: INTERNAL MEDICINE

## 2025-02-05 PROCEDURE — 81001 URINALYSIS AUTO W/SCOPE: CPT

## 2025-02-05 PROCEDURE — 36415 COLL VENOUS BLD VENIPUNCTURE: CPT

## 2025-02-05 PROCEDURE — 83880 ASSAY OF NATRIURETIC PEPTIDE: CPT

## 2025-02-05 PROCEDURE — 99214 OFFICE O/P EST MOD 30 MIN: CPT | Performed by: PHYSICIAN ASSISTANT

## 2025-02-05 PROCEDURE — 1160F RVW MEDS BY RX/DR IN RCRD: CPT | Performed by: PHYSICIAN ASSISTANT

## 2025-02-05 PROCEDURE — 93005 ELECTROCARDIOGRAM TRACING: CPT

## 2025-02-05 PROCEDURE — 1159F MED LIST DOCD IN RCRD: CPT | Performed by: PHYSICIAN ASSISTANT

## 2025-02-05 NOTE — PROGRESS NOTES
Subjective:   Patient ID: Bella Kirby is a 89 year old female.    HPI  Patient presents feeling unwell the last several days.     She has been feeling very tired and generally weak  She has not been able to leave the house and go out  Feels like talking takes up too much energy    BP in the last few days has been better 140s systolic which is typical for her  Yesterday while very tired it was 130s    Her HR feels abnormal  She has had palpitations and racing when she checks her radial pulse  Feels lightheaded/dizzy  Also nauseous, no vomiting  No syncope of falls  Mild headaches  No vision changes  Weakness in arms and legs but symmetrical   No sweating  No fever or chills    No chest pain  She denies shortness of breath  Denies swelling/edema    Appetite is normal   Sleep is good    No dysuria  No frequency or urgency  No hematuria or urine odor    Denies confusion, disorientation or mood changes    Losartan BID  Metoprolol nightly  Xarelto nightly with ASA      History/Other:   Review of Systems   Constitutional:  Positive for fatigue. Negative for chills and fever.   HENT:  Negative for congestion, ear pain, rhinorrhea and sore throat.    Eyes:  Negative for visual disturbance.   Respiratory:  Negative for cough, shortness of breath and wheezing.    Cardiovascular:  Positive for palpitations and leg swelling. Negative for chest pain.   Gastrointestinal:  Positive for nausea. Negative for abdominal pain, diarrhea and vomiting.   Endocrine: Positive for polydipsia.   Genitourinary:  Negative for dysuria, frequency and hematuria.   Musculoskeletal:  Negative for arthralgias, gait problem and myalgias.   Skin:  Negative for rash.   Neurological:  Positive for dizziness, weakness, light-headedness and headaches. Negative for syncope, speech difficulty and numbness.   Hematological:  Negative for adenopathy.   Psychiatric/Behavioral:  Negative for dysphoric mood. The patient is not nervous/anxious.      Current  Outpatient Medications   Medication Sig Dispense Refill    ESCITALOPRAM 10 MG Oral Tab TAKE ONE TABLET BY MOUTH ONCE DAILY 90 tablet 0    LEVOTHYROXINE 50 MCG Oral Tab Take 1 tablet (50 mcg total) by mouth before breakfast. 90 tablet 0    nitroglycerin 0.4 % Rectal Ointment Place 1.5 g rectally every 12 (twelve) hours. 30 g 0    mupirocin 2 % External Cream Use bid 15 g 1    losartan 25 MG Oral Tab Take 1 tablet (25 mg total) by mouth daily.      aspirin 81 MG Oral Chew Tab Chew 1 tablet (81 mg total) by mouth daily. 90 tablet 3    metoprolol succinate ER 25 MG Oral Tablet 24 Hr Take 1 tablet (25 mg total) by mouth daily.      hydrocortisone 2.5 % External Cream Apply thin layer to affected areas twice daily x 2 weeks on and 2 weeks off, then restart 30 g 2    Rivaroxaban (XARELTO) 20 MG Oral Tab Take 1 tablet (20 mg total) by mouth daily. 90 tablet 3    Probiotic Product (PROBIOTIC DAILY OR) Take by mouth daily.      Multiple Vitamin (MULTI-VITAMIN DAILY) Oral Tab Take by mouth daily.        Misc Natural Products (LUTEIN 20 OR) Take by mouth daily.        Ascorbic Acid (SUPER C COMPLEX OR) Take by mouth daily.         Allergies:Allergies[1]    Objective:   Physical Exam  Vitals and nursing note reviewed.   Constitutional:       General: She is not in acute distress.     Appearance: Normal appearance. She is well-developed.   HENT:      Head: Normocephalic and atraumatic.      Right Ear: Tympanic membrane, ear canal and external ear normal.      Left Ear: Tympanic membrane, ear canal and external ear normal.      Nose: Nose normal.      Mouth/Throat:      Mouth: Mucous membranes are moist.   Eyes:      Extraocular Movements: Extraocular movements intact.      Conjunctiva/sclera: Conjunctivae normal.      Pupils: Pupils are equal, round, and reactive to light.   Neck:      Thyroid: No thyromegaly.   Cardiovascular:      Rate and Rhythm: Normal rate and regular rhythm.      Pulses: Normal pulses.      Heart sounds:  Normal heart sounds.   Pulmonary:      Effort: Pulmonary effort is normal.      Breath sounds: Normal breath sounds. Decreased air movement present. No wheezing or rales.   Abdominal:      General: Bowel sounds are normal. There is no distension.      Palpations: Abdomen is soft. There is no mass.      Tenderness: There is no abdominal tenderness.   Musculoskeletal:         General: No tenderness. Normal range of motion.      Cervical back: Normal range of motion and neck supple.      Right lower le+ Edema present.      Left lower le+ Edema present.   Lymphadenopathy:      Cervical: No cervical adenopathy.   Skin:     General: Skin is warm and dry.      Findings: No rash.   Neurological:      General: No focal deficit present.      Mental Status: She is alert and oriented to person, place, and time.      Cranial Nerves: No cranial nerve deficit.      Sensory: No sensory deficit.      Motor: Weakness present.      Gait: Gait normal.   Psychiatric:         Mood and Affect: Mood normal.         Behavior: Behavior normal.         Assessment & Plan:   1. Generalized weakness  2. Palpitations  3. Uncontrolled hypertension  Patient here feeling generally unwell, weak, and with uncontrolled HTN. On exam her lungs are clear but with decreased air entry, heart RRR, pitting LE edema. Check labs, EKG, and CXR to evaluate symptoms further. Follow up pending results to discuss next steps. Recommend she take metoprolol 25 mg when she gets home and monitor BP. If it remains elevated or any symptoms worsen, go to ER. Patient agrees with plan.  - CBC With Differential With Platelet; Future  - Comp Metabolic Panel (14); Future  - Hemoglobin A1C; Future  - Urinalysis with Culture Reflex; Future  - Pro Beta Natriuretic Peptide [E]; Future  - EKG 12 Lead; Future  - XR CHEST PA + LAT CHEST (CPT=71046); Future                 [1]   Allergies  Allergen Reactions    Acyclovir OTHER (SEE COMMENTS)    Amoxicillin     Atorvastatin  MYALGIA and OTHER (SEE COMMENTS)    Azithromycin OTHER (SEE COMMENTS)    Brinzolamide OTHER (SEE COMMENTS)    Brompheniramine-Phenylephrine OTHER (SEE COMMENTS)    Cefaclor ANAPHYLAXIS     Headache    Headache  Headache    Cephalexin OTHER (SEE COMMENTS)    Cinoxacin OTHER (SEE COMMENTS)    Clarithromycin OTHER (SEE COMMENTS)    Cyclobenzaprine OTHER (SEE COMMENTS)    Darvon [Propoxyphene]     Diphenoxylate-Atropine OTHER (SEE COMMENTS)    Doxycycline OTHER (SEE COMMENTS)    Epinephrine OTHER (SEE COMMENTS)    Ibuprofen OTHER (SEE COMMENTS)    Latex OTHER (SEE COMMENTS)    Loperamide OTHER (SEE COMMENTS)    Loracarbef OTHER (SEE COMMENTS)    Mayonaise     Methylprednisolone OTHER (SEE COMMENTS)    Metronidazole OTHER (SEE COMMENTS)     Flagyl  Flagyl  Flagyl    Miconazole OTHER (SEE COMMENTS)    Nitrofurantoin OTHER (SEE COMMENTS)    Olive Oil OTHER (SEE COMMENTS)    Paxil [Paroxetine]     Pyridin [Phenazopyridine]     Rosuvastatin OTHER (SEE COMMENTS)     Agitation   Agitation   Agitation     Sulfamethoxazole      Throat swelling    Synalgos Dc     Donnatal [Belladonna Alk-Phenobarbital]     Gantrisin [Sulfisoxazole]     Sudafed [Pseudoephedrine]     Trazodone     Ampicillin     Bactrim [Sulfamethoxazole W/Trimethoprim]     Dimetapp Cold-Allergy [Brompheniramine-Phenylephrine]     Effexor [Venlafaxine]      Headache      Mycostatin [Nystatin]     Radiology Contrast Iodinated Dyes RASH     MRI contrast per patient

## 2025-02-06 ENCOUNTER — TELEPHONE (OUTPATIENT)
Dept: FAMILY MEDICINE CLINIC | Facility: CLINIC | Age: OVER 89
End: 2025-02-06

## 2025-02-07 ENCOUNTER — OFFICE VISIT (OUTPATIENT)
Dept: PHYSICAL THERAPY | Age: OVER 89
End: 2025-02-07
Attending: PHYSICIAN ASSISTANT
Payer: MEDICARE

## 2025-02-07 PROCEDURE — 97110 THERAPEUTIC EXERCISES: CPT

## 2025-02-07 NOTE — PROGRESS NOTES
Diagnosis:   Inguinal pain, unspecified laterality (R10.30)  Sciatic nerve pain, unspecified laterality (M54.30)         Referring Provider: Astrid Chavez  Date of Evaluation:    1/28/2025     Precautions:  None Next MD visit:   none scheduled  Date of Surgery: n/a   Insurance Primary/Secondary: [employer] [facility] / N/A     # Auth Visits: 8            Subjective: Pt has been under the weather the past week or so and has had some issues with higher than normal BP. Pt has had a follow-up with her PCP and she will continue to monitor BP. As for her low back and L sided groin discomfort seems to come and go in intensity with today being a good day.     Pain: 0/10      Objective:   Palpation: Tenderness with palpation of glute musculature, quads and iliopsoas musculature on the L  Sensation: intact and equal bilaterally     AROM: (* denotes performed with pain)  Hip   Flexion: R 120; *L 100  Extension: R 10; *L 3  Abduction: R 20; L 20  ER: R 30;* L 30  IR: R 20; *L 10           Assessment: Pt responded well to skilled therapy session w/o an increase in L sided hip and groin pain following exercise prescription. Pt was progressed in CKC exercises that focused on glute strength and SLS due to deficits in the aforementioned areas. Pt will continue to benefit from skilled therapy to improve BLE strength and pain free mobility.        Goals: (to be met in 8 visits)    Not Met Progress Toward Partially Met Met   Pt will have improved hip AROM Flex to 120 deg to be able to don/doff shoes and perform sit to stand transfers without difficulty. []  []  []  []    Pt will improve hip ABD and ER strength to 4+/5 to increase ease with standing and walking. []  []  []  []    Pt will be able to squat to  light objects around the house with <2/10 hip pain. []  []  []  []    Pt will improve functional hip strength to report ability to ascend/descend 1 flight of stairs reciprocally without use of handrail. []  []  []  []    Pt will  demonstrate improved SLS to >30 seconds ROYA to promote safety and decrease risk of falls on uneven surfaces such as grass and gravel. []  []  []  []    Pt will perform 5xSTST in <11 seconds, demonstrating improved gait speed and functional mobility for improved community ambulation. []  []  []  []    Pt will be independent and compliant with comprehensive HEP to maintain progress achieved in PT. []  []  []  []        Plan: Progress per POC  Date: 2/7/2025  TX#: 2/8 Date:                 TX#: 3/ Date:                 TX#: 4/ Date:                 TX#: 5/ Date:   Tx#: 6/   NuStep lvl 3 x6 min       Monster walks forward/backward in // bars using GTB at knees       Hook-lying hip ABD(belt) + ADD(ball) 3x10 w 3 sec ISO holds       B knee to chest and LTR x30 each       Partial bridge on exercise ball 3x10       PF/DF on slant board + gastroc stretch 3x30 sec       Alternating toe taps on large box in // bars 3x10 per leg       HEP: Access Code: [email or web address]  URL: [email or web address]  Date: 01/28/2025  Prepared by: Darrion French     Exercises  - Hooklying Single Knee to Chest Stretch  - 4-5 x weekly - 2-3 sets - 10 reps  - Hip Flexor Stretch on Step  - 5-6 x weekly - 3 sets - 30-40 hold  - Standing Hamstring Stretch with Step  - 1 x daily - 5-6 x weekly - 3 sets - 10 reps  - Supine Sciatic Nerve Glide  - 5-6 x weekly - 2 sets - 10 reps  - Bent Knee Fallouts  - 3-4 x weekly - 2-3 sets - 10 reps  - Standing Hip Abduction with Counter Support  - 3 x weekly - 3 sets - 10 reps    Charges: Ther EX(3) MT(0)       Total Timed Treatment: 43 min  Total Treatment Time: 45 min

## 2025-02-10 ENCOUNTER — TELEPHONE (OUTPATIENT)
Dept: FAMILY MEDICINE CLINIC | Facility: CLINIC | Age: OVER 89
End: 2025-02-10

## 2025-02-10 NOTE — TELEPHONE ENCOUNTER
Pt calling and states she is still having an issue with her blood pressure.   Yesterday and today it was 166/83    Pt scheduled appt with LE on 02/19/25  Please advise

## 2025-02-10 NOTE — TELEPHONE ENCOUNTER
I recommend she take both losartan pills (100 mg) and her metoprolol in the morning starting tomorrow. Wait 1-2 hours then check BP after meds. She should not be adjusting/adding extra dose of any meds including lexapro without contacting a provider first.

## 2025-02-10 NOTE — TELEPHONE ENCOUNTER
Spoke to pt, states b/p this morning 165/83 P 78.  Gets nausea and then anxiety and that's when her b/p rises.  Last night she took an extra escitalopram 10mg and went to bed.  Feel okay this morning, however concerend about b/p rising again.   Takes Losartan BID  Metoprolol nightly    Has appt 2/19, do you want to adjust any meds till then?

## 2025-02-11 ENCOUNTER — APPOINTMENT (OUTPATIENT)
Dept: PHYSICAL THERAPY | Age: OVER 89
End: 2025-02-11
Attending: PHYSICIAN ASSISTANT
Payer: MEDICARE

## 2025-02-12 ENCOUNTER — APPOINTMENT (OUTPATIENT)
Dept: PHYSICAL THERAPY | Age: OVER 89
End: 2025-02-12
Attending: PHYSICIAN ASSISTANT
Payer: MEDICARE

## 2025-02-13 ENCOUNTER — APPOINTMENT (OUTPATIENT)
Dept: PHYSICAL THERAPY | Age: OVER 89
End: 2025-02-13
Attending: PHYSICIAN ASSISTANT
Payer: MEDICARE

## 2025-02-17 ENCOUNTER — APPOINTMENT (OUTPATIENT)
Dept: PHYSICAL THERAPY | Age: OVER 89
End: 2025-02-17
Attending: PHYSICIAN ASSISTANT
Payer: MEDICARE

## 2025-02-18 ENCOUNTER — APPOINTMENT (OUTPATIENT)
Dept: PHYSICAL THERAPY | Age: OVER 89
End: 2025-02-18
Attending: PHYSICIAN ASSISTANT
Payer: MEDICARE

## 2025-02-19 ENCOUNTER — OFFICE VISIT (OUTPATIENT)
Dept: FAMILY MEDICINE CLINIC | Facility: CLINIC | Age: OVER 89
End: 2025-02-19
Payer: MEDICARE

## 2025-02-19 VITALS
HEART RATE: 80 BPM | DIASTOLIC BLOOD PRESSURE: 70 MMHG | SYSTOLIC BLOOD PRESSURE: 140 MMHG | OXYGEN SATURATION: 98 % | HEIGHT: 62 IN | WEIGHT: 147 LBS | BODY MASS INDEX: 27.05 KG/M2 | RESPIRATION RATE: 15 BRPM

## 2025-02-19 DIAGNOSIS — F41.9 ANXIETY: Chronic | ICD-10-CM

## 2025-02-19 DIAGNOSIS — I10 ESSENTIAL HYPERTENSION: Primary | ICD-10-CM

## 2025-02-19 PROCEDURE — 1160F RVW MEDS BY RX/DR IN RCRD: CPT | Performed by: PHYSICIAN ASSISTANT

## 2025-02-19 PROCEDURE — 3008F BODY MASS INDEX DOCD: CPT | Performed by: PHYSICIAN ASSISTANT

## 2025-02-19 PROCEDURE — 3078F DIAST BP <80 MM HG: CPT | Performed by: PHYSICIAN ASSISTANT

## 2025-02-19 PROCEDURE — 3077F SYST BP >= 140 MM HG: CPT | Performed by: PHYSICIAN ASSISTANT

## 2025-02-19 PROCEDURE — 99214 OFFICE O/P EST MOD 30 MIN: CPT | Performed by: PHYSICIAN ASSISTANT

## 2025-02-19 PROCEDURE — 1159F MED LIST DOCD IN RCRD: CPT | Performed by: PHYSICIAN ASSISTANT

## 2025-02-19 NOTE — PROGRESS NOTES
Subjective:   Patient ID: Bella Kirby is a 89 year old female.    HPI  Patient presents for follow up from last visit:    H/o anxiety  Currently on lexapro 10 mg daily and tolerating well  She wakes in the morning with anxiety most days  Cannot understand why this is happening - no triggers  She has h/o panic attacks, mostly in the evening - worried that these will return  They were typically triggered by social events (going to dinner with a friends)  Has not happened in quite a while fortunately  She used to take valium for this which helped  She is open to having a medication adjustment     H/o HTN  Has been better controlled since she started taking all her BP meds in the morning  No chest pain, sob, palpitations, headache, vision change      History/Other:   Review of Systems   Constitutional:  Negative for chills, diaphoresis and fever.   Eyes:  Negative for visual disturbance.   Respiratory:  Negative for chest tightness and shortness of breath.    Cardiovascular:  Negative for chest pain, palpitations and leg swelling.   Neurological:  Negative for dizziness, light-headedness and headaches.   Psychiatric/Behavioral:  The patient is nervous/anxious.      Current Outpatient Medications   Medication Sig Dispense Refill    ESCITALOPRAM 10 MG Oral Tab TAKE ONE TABLET BY MOUTH ONCE DAILY 90 tablet 0    LEVOTHYROXINE 50 MCG Oral Tab Take 1 tablet (50 mcg total) by mouth before breakfast. 90 tablet 0    nitroglycerin 0.4 % Rectal Ointment Place 1.5 g rectally every 12 (twelve) hours. 30 g 0    mupirocin 2 % External Cream Use bid 15 g 1    losartan 25 MG Oral Tab Take 1 tablet (25 mg total) by mouth daily.      aspirin 81 MG Oral Chew Tab Chew 1 tablet (81 mg total) by mouth daily. 90 tablet 3    metoprolol succinate ER 25 MG Oral Tablet 24 Hr Take 1 tablet (25 mg total) by mouth daily.      hydrocortisone 2.5 % External Cream Apply thin layer to affected areas twice daily x 2 weeks on and 2 weeks off, then  restart 30 g 2    Rivaroxaban (XARELTO) 20 MG Oral Tab Take 1 tablet (20 mg total) by mouth daily. 90 tablet 3    Probiotic Product (PROBIOTIC DAILY OR) Take by mouth daily.      Multiple Vitamin (MULTI-VITAMIN DAILY) Oral Tab Take by mouth daily.        Misc Natural Products (LUTEIN 20 OR) Take by mouth daily.        Ascorbic Acid (SUPER C COMPLEX OR) Take by mouth daily.         Allergies:Allergies[1]    Objective:   Physical Exam  Vitals and nursing note reviewed.   Constitutional:       Appearance: She is well-developed.   HENT:      Head: Normocephalic and atraumatic.   Eyes:      Conjunctiva/sclera: Conjunctivae normal.      Pupils: Pupils are equal, round, and reactive to light.   Cardiovascular:      Rate and Rhythm: Regular rhythm.      Heart sounds: Normal heart sounds.   Pulmonary:      Effort: Pulmonary effort is normal.      Breath sounds: Normal breath sounds. No wheezing or rales.   Musculoskeletal:      Cervical back: Normal range of motion and neck supple.   Neurological:      Mental Status: She is alert.         Assessment & Plan:   1. Essential hypertension  Much better controlled with taking meds in the morning. Will continue the same routine and keep medications at the current dosages. Continue to monitor at home with goal 150/90 and less. Follow up in 3-6 months. Sooner prn.     2. Anxiety  Uncontrolled. Increase lexapro from 10 mg to 15 mg daily. Follow up in 4 weeks to reassess. Sooner prn.              [1]   Allergies  Allergen Reactions    Acyclovir OTHER (SEE COMMENTS)    Amoxicillin     Atorvastatin MYALGIA and OTHER (SEE COMMENTS)    Azithromycin OTHER (SEE COMMENTS)    Brinzolamide OTHER (SEE COMMENTS)    Brompheniramine-Phenylephrine OTHER (SEE COMMENTS)    Cefaclor ANAPHYLAXIS     Headache    Headache  Headache    Cephalexin OTHER (SEE COMMENTS)    Cinoxacin OTHER (SEE COMMENTS)    Clarithromycin OTHER (SEE COMMENTS)    Cyclobenzaprine OTHER (SEE COMMENTS)    Darvon [Propoxyphene]      Diphenoxylate-Atropine OTHER (SEE COMMENTS)    Doxycycline OTHER (SEE COMMENTS)    Epinephrine OTHER (SEE COMMENTS)    Ibuprofen OTHER (SEE COMMENTS)    Latex OTHER (SEE COMMENTS)    Loperamide OTHER (SEE COMMENTS)    Loracarbef OTHER (SEE COMMENTS)    Mayonaise     Methylprednisolone OTHER (SEE COMMENTS)    Metronidazole OTHER (SEE COMMENTS)     Flagyl  Flagyl  Flagyl    Miconazole OTHER (SEE COMMENTS)    Nitrofurantoin OTHER (SEE COMMENTS)    Olive Oil OTHER (SEE COMMENTS)    Paxil [Paroxetine]     Pyridin [Phenazopyridine]     Rosuvastatin OTHER (SEE COMMENTS)     Agitation   Agitation   Agitation     Sulfamethoxazole      Throat swelling    Synalgos Dc     Donnatal [Belladonna Alk-Phenobarbital]     Gantrisin [Sulfisoxazole]     Sudafed [Pseudoephedrine]     Trazodone     Ampicillin     Bactrim [Sulfamethoxazole W/Trimethoprim]     Dimetapp Cold-Allergy [Brompheniramine-Phenylephrine]     Effexor [Venlafaxine]      Headache      Mycostatin [Nystatin]     Radiology Contrast Iodinated Dyes RASH     MRI contrast per patient

## 2025-02-20 ENCOUNTER — APPOINTMENT (OUTPATIENT)
Dept: PHYSICAL THERAPY | Age: OVER 89
End: 2025-02-20
Attending: PHYSICIAN ASSISTANT
Payer: MEDICARE

## 2025-02-24 ENCOUNTER — APPOINTMENT (OUTPATIENT)
Dept: PHYSICAL THERAPY | Age: OVER 89
End: 2025-02-24
Attending: PHYSICIAN ASSISTANT
Payer: MEDICARE

## 2025-03-03 ENCOUNTER — TELEPHONE (OUTPATIENT)
Dept: PHYSICAL THERAPY | Age: OVER 89
End: 2025-03-03

## 2025-03-03 ENCOUNTER — APPOINTMENT (OUTPATIENT)
Dept: PHYSICAL THERAPY | Age: OVER 89
End: 2025-03-03
Attending: PHYSICIAN ASSISTANT
Payer: MEDICARE

## 2025-03-31 DIAGNOSIS — F32.5 MAJOR DEPRESSIVE DISORDER IN REMISSION, UNSPECIFIED WHETHER RECURRENT: ICD-10-CM

## 2025-03-31 DIAGNOSIS — F41.9 ANXIETY: ICD-10-CM

## 2025-03-31 RX ORDER — ESCITALOPRAM OXALATE 10 MG/1
10 TABLET ORAL DAILY
Qty: 90 TABLET | Refills: 0 | Status: SHIPPED | OUTPATIENT
Start: 2025-03-31

## 2025-03-31 NOTE — TELEPHONE ENCOUNTER
A refill request was received for:  Requested Prescriptions     Pending Prescriptions Disp Refills    ESCITALOPRAM 10 MG Oral Tab [Pharmacy Med Name: Escitalopram Oxalate 10 Mg Tab Nort] 90 tablet 0     Sig: TAKE ONE TABLET BY MOUTH ONCE DAILY       Last refill date:   1-23-25    Last office visit: 2-19-25 LR    Follow up due:  Future Appointments   Date Time Provider Department Center   4/14/2025  2:00 PM Kath Noonan APRN [facility]

## 2025-04-03 DIAGNOSIS — M79.605 LEFT LEG PAIN: Primary | ICD-10-CM

## 2025-04-03 DIAGNOSIS — F41.9 ANXIETY: ICD-10-CM

## 2025-04-03 DIAGNOSIS — F32.5 MAJOR DEPRESSIVE DISORDER IN REMISSION, UNSPECIFIED WHETHER RECURRENT: ICD-10-CM

## 2025-04-03 RX ORDER — ESCITALOPRAM OXALATE 10 MG/1
TABLET ORAL
Qty: 135 TABLET | Refills: 0 | Status: SHIPPED | OUTPATIENT
Start: 2025-04-03 | End: 2025-06-06

## 2025-04-03 NOTE — TELEPHONE ENCOUNTER
Patient called and stated that LR increased her dose of     ESCITALOPRAM 10 MG Oral Tab     To 1 & 1/2 pills but she only got the prescription for the 10mg and now the pharmacy is saying that the insurance wont pay for it for another 5 days and she only has 5 tablets left. Please advise.     She also said she would like to go back to physical therapy about her leg. Please advise.

## 2025-04-03 NOTE — TELEPHONE ENCOUNTER
Mary,  Rx that was refilled 3/31 is incorrect - resent with correct dosage and directions. Can I place another referral for PT left leg? She did PT for 1 month and stopped. Wants to go back.Please approve new Rx.

## 2025-04-14 ENCOUNTER — OFFICE VISIT (OUTPATIENT)
Dept: NEUROLOGY | Facility: CLINIC | Age: OVER 89
End: 2025-04-14
Payer: MEDICARE

## 2025-04-14 VITALS
DIASTOLIC BLOOD PRESSURE: 70 MMHG | OXYGEN SATURATION: 97 % | WEIGHT: 145 LBS | HEART RATE: 86 BPM | SYSTOLIC BLOOD PRESSURE: 128 MMHG | HEIGHT: 62 IN | BODY MASS INDEX: 26.68 KG/M2 | RESPIRATION RATE: 16 BRPM

## 2025-04-14 DIAGNOSIS — F41.9 ANXIETY: Chronic | ICD-10-CM

## 2025-04-14 DIAGNOSIS — M25.552 HIP PAIN, CHRONIC, LEFT: ICD-10-CM

## 2025-04-14 DIAGNOSIS — G45.9 TIA (TRANSIENT ISCHEMIC ATTACK): Primary | ICD-10-CM

## 2025-04-14 DIAGNOSIS — F32.A DEPRESSION, UNSPECIFIED DEPRESSION TYPE: ICD-10-CM

## 2025-04-14 DIAGNOSIS — G89.29 HIP PAIN, CHRONIC, LEFT: ICD-10-CM

## 2025-04-14 DIAGNOSIS — R26.89 BALANCE PROBLEM: ICD-10-CM

## 2025-04-14 PROCEDURE — 3078F DIAST BP <80 MM HG: CPT

## 2025-04-14 PROCEDURE — 3074F SYST BP LT 130 MM HG: CPT

## 2025-04-14 PROCEDURE — 3008F BODY MASS INDEX DOCD: CPT

## 2025-04-14 PROCEDURE — 1160F RVW MEDS BY RX/DR IN RCRD: CPT

## 2025-04-14 PROCEDURE — 99213 OFFICE O/P EST LOW 20 MIN: CPT

## 2025-04-14 PROCEDURE — 1159F MED LIST DOCD IN RCRD: CPT

## 2025-04-14 NOTE — PROGRESS NOTES
[facility]  Progress Note    Bella Kirby     1935 MRN [id number]   Location [facility] PCP Avery Elias DO       HPI:    Patient ID: Bella Kirby is a right-handed 89 year old female.    HPI:  Patient presents to clinic for follow up for TIA in 2024. PMH is listed below and is significant for stroke, hypertension, hyperlipidemia, hypothyroidism, paroxysmal atrial fibrillation (on Xarelto).  Symptoms consisted of confusion and difficulty speaking. Workup did not reveal any acute abnormalities. She was continued on Xarelto and placed on Aspirin 81 mg for stroke prophylaxis.  She is allergic to statins. Denies headache, focal weakness, vision changes, confusion, paresthesias, new gait/balance issues (has chronic issues with her L hip), speech difficulties, or swallowing issues. She states she does some walking for exercise but is limited by L hip pain and states that sometimes it gives out. Has cane and walker but doesn't use them. She denies any falls.  Currently, she is struggling with depression due to hair thinning/loss and multiple skin cancer biopsies and would be interested in seeing a therapist.      HISTORY:  Past Medical History[1]   Past Surgical History[2]   Family History[3]   Short Social Hx on File[4]     Review of Systems  ROS negative except as discussed in HPI     Current Medications[5]  Allergies:Allergies[6]  FOCUSED PHYSICAL EXAM:     Vital Signs: /70   Pulse 86   Resp 16   Ht 62\"   Wt 145 lb (65.8 kg)   SpO2 97%   BMI 26.52 kg/m²      General:   Appearance: Well nourished, well developed, no apparent distress.  HEENT: Normocephalic and atraumatic. Normal sclera. Moist mucus membrane  Neck: Normal range of motion.  Cardiovascular: No acute distress    Pulmonary/Chest: Effort normal, no use of accessory muscles  Skin: No rashes or lesions visualized     Mental Status Exam:   Patient is awake, alert and  oriented to person, place and time with normal memory, fund of knowledge, attention/concentration and language.    Cranial Nerves:   II: Visual fields normal to confrontation test  III: Pupils equal, round, reactive to light  III,IV,VI: Normal EOM. Normal saccades.   V: Facial sensation intact, symmetric  VII: Facial strength normal, symmetric  VIII: Hearing grossly intact  IX: Palate elevates symmetrically  XI: Shoulder shrug elevates symmetrically  XII: Tongue protrudes in midline    Motor Exam:   Tone: Normal    Strength: Deltoid Biceps Triceps Hand  Hip Flex Quads Hamstring Dorsiflex Plantar Flex   Right 5 5 5 5 5 5 5 5 5   Left 5 5 5 5 5 5 5 5 5     Reflexes: biceps brachioradialis patellar   Right 2+ 2+ 2+   Left 2+ 2+ 2+     Sensory:  Intact and symmetric sensation to light touch and vibration in all extremities     Coordination:   Normal finger-nose-finger test    Gait:   Stands up and walks unassisted with antalgic gait    Unable to tandem walk  Romberg negative    TESTS/IMAGING:      Latest Reference Range & Units Most Recent   HEMOGLOBIN A1c <5.7 % 5.9 (H)  2/5/25 14:31   Cholesterol, Total <200 mg/dL 243 (H)  7/3/24 06:10   Triglycerides 30 - 149 mg/dL 107  7/3/24 06:10   HDL Cholesterol 40 - 59 mg/dL 58  7/3/24 06:10   LDL Cholesterol Calc <100 mg/dL 166 (H)  7/3/24 06:10          ASSESSMENT/PLAN:     Encounter Diagnoses   Name Primary?    TIA (transient ischemic attack) Yes    Anxiety     Depression, unspecified depression type     Balance problem     Hip pain, chronic, left        Impression:   TIA: No recurrence of symptoms on current meds. Stroke education provided as below.  Reviewed emergent s/s that would warrant prompt evaluation in the ED.     Depression/Anxiety: Referral to Psychology for further evaluation and management    L hip pain: History of OA, referral to Orthopedics for further evaluation and management.  Offered PT but she declined as she has not found it helpful in the past.   Referral to [facility] to help facilitate safe activity.  Counseled against using NSAIDS for pain due to bleeding risk on Xarelto/ASA. Verbalized understanding; uses Tylenol.    Plan:  Continue Xarelto and Aspirin 81 mg daily - monitor for bleeding, avoid NSAIDS  Allergic to statins  Referral to Psychology for therapist/counseling services  Referral to Orthopedics for hip pain  Referral to [facility] for assistance with safe exercise given physical limitations with her hip pain and risk of bleeding in case of falls  Follow up with your Cardiologist and PCP for management of stroke risk factors  Follow up with Dr. Hoyt in 6 months   Go to the ER for any new or worsening symptoms and contact the office    Counseled and educated on secondary stroke prevention:  Hypertension - target blood pressure less than 130/80   Hyperlipidemia - Goals: total cholesterol less than 200, LDL less than 70, HDL greater than 45, triglycerides less than 150  Your LDL is 166  Diabetes - target HgA1c less than 7%  Your A1c is at goal - 5.9  Physical inactivity - target exercise at least 3 times per week for a total of 150 minutes  Participate as well as you can given your hip pain  Diet - recommend Mediterranean diet with an emphasis on lean protein and vegetables  Please do not hesitate to call if you have any questions.     Meds This Visit:  Requested Prescriptions      No prescriptions requested or ordered in this encounter       Imaging & Referrals:  PSYCHOLOGY - INTERNAL  ORTHOPEDIC - INTERNAL  OP REFERRAL [facility]      ISADORA Perkins  [facility][1]  4/14/2025  3:12 PM    The 21st Century Cures Act makes medical notes like these available to patients in the interest of transparency. Please be advised this is a medical document. Medical documents are intended to carry relevant information, facts as evident, and the clinical opinion of the practitioner. The medical note  is intended as peer to peer communication and may appear blunt or direct. It is written in medical language and may contain abbreviations or verbiage that are unfamiliar.     This note was prepared using Dragon Medical voice recognition dictation software. As a result errors may occur. When identified these errors have been corrected. While every attempt is made to correct errors during dictation discrepancies may still exist.          [1]   Past Medical History:   Acute, but ill-defined, cerebrovascular disease    Allergic rhinitis    Anxiety    Arthritis    Back, hips, hands    Atherosclerosis of coronary artery    Back injury    Bone spur of other site    R shoulder    Essential hypertension    Hyperlipidemia    Infection of tooth    Osteoarthritis    Stroke (HCC)    Thyroid disease    TIA (transient ischemic attack)    19   [2]   Past Surgical History:  Procedure Laterality Date    Appendectomy      Hysterectomy      partial      3    Other surgical history Left     leg, cancer cell removal    Skin surgery  11/10/2021    BCC left sandy of jaw mohs by Dr Victor    Tonsillectomy     [3]   Family History  Problem Relation Age of Onset    Stroke Mother     Heart Disorder Father     Other (Down's Syndrome) Sister     Other (hypotension) Brother 84        orthostatsis due to Parkinson's    Other (hypoglycemia) Brother 81    Other (Parkinson's disease) Brother     Hyperlipidemia Brother     Hyperlipidemia Brother     Other (malabsorption) Daughter     Hyperlipidemia Daughter     Other (cardiac shunt) Daughter         repaired age 17 surgically    Hyperlipidemia Daughter     Other (osteoarthritis) Daughter         hip   [4]   Social History  Socioeconomic History    Marital status:    Tobacco Use    Smoking status: Never    Smokeless tobacco: Never   Vaping Use    Vaping status: Never Used   Substance and Sexual Activity    Alcohol use: Never    Drug use: Never   Other Topics Concern    Caffeine  Concern Yes     Comment: tea all day long    Exercise Yes     Comment: walking    Seat Belt Yes    Special Diet No    Stress Concern Yes    Weight Concern No     Social Drivers of Health     Food Insecurity: No Food Insecurity (7/3/2024)    Food Insecurity     Food Insecurity: Never true   Transportation Needs: No Transportation Needs (7/5/2024)    Transportation Needs     Lack of Transportation: No   Housing Stability: Low Risk  (7/3/2024)    Housing Stability     Housing Instability: No   [5]   Current Outpatient Medications   Medication Sig Dispense Refill    escitalopram 10 MG Oral Tab Take one and one half tablets once daily. 135 tablet 0    LEVOTHYROXINE 50 MCG Oral Tab Take 1 tablet (50 mcg total) by mouth before breakfast. 90 tablet 0    nitroglycerin 0.4 % Rectal Ointment Place 1.5 g rectally every 12 (twelve) hours. 30 g 0    mupirocin 2 % External Cream Use bid 15 g 1    losartan 25 MG Oral Tab Take 1 tablet (25 mg total) by mouth daily.      aspirin 81 MG Oral Chew Tab Chew 1 tablet (81 mg total) by mouth daily. 90 tablet 3    metoprolol succinate ER 25 MG Oral Tablet 24 Hr Take 1 tablet (25 mg total) by mouth daily.      hydrocortisone 2.5 % External Cream Apply thin layer to affected areas twice daily x 2 weeks on and 2 weeks off, then restart 30 g 2    Rivaroxaban (XARELTO) 20 MG Oral Tab Take 1 tablet (20 mg total) by mouth daily. 90 tablet 3    Probiotic Product (PROBIOTIC DAILY OR) Take by mouth daily.      Multiple Vitamin (MULTI-VITAMIN DAILY) Oral Tab Take by mouth daily.        Misc Natural Products (LUTEIN 20 OR) Take by mouth daily.        Ascorbic Acid (SUPER C COMPLEX OR) Take by mouth daily.       [6]   Allergies  Allergen Reactions    Acyclovir OTHER (SEE COMMENTS)    Amoxicillin     Atorvastatin MYALGIA and OTHER (SEE COMMENTS)    Azithromycin OTHER (SEE COMMENTS)    Brinzolamide OTHER (SEE COMMENTS)    Brompheniramine-Phenylephrine OTHER (SEE COMMENTS)    Cefaclor ANAPHYLAXIS      Headache    Headache  Headache    Cephalexin OTHER (SEE COMMENTS)    Cinoxacin OTHER (SEE COMMENTS)    Clarithromycin OTHER (SEE COMMENTS)    Cyclobenzaprine OTHER (SEE COMMENTS)    Darvon [Propoxyphene]     Diphenoxylate-Atropine OTHER (SEE COMMENTS)    Doxycycline OTHER (SEE COMMENTS)    Epinephrine OTHER (SEE COMMENTS)    Ibuprofen OTHER (SEE COMMENTS)    Latex OTHER (SEE COMMENTS)    Loperamide OTHER (SEE COMMENTS)    Loracarbef OTHER (SEE COMMENTS)    Mayonaise     Methylprednisolone OTHER (SEE COMMENTS)    Metronidazole OTHER (SEE COMMENTS)     Flagyl  Flagyl  Flagyl    Miconazole OTHER (SEE COMMENTS)    Nitrofurantoin OTHER (SEE COMMENTS)    Olive Oil OTHER (SEE COMMENTS)    Paxil [Paroxetine]     Pyridin [Phenazopyridine]     Rosuvastatin OTHER (SEE COMMENTS)     Agitation   Agitation   Agitation     Sulfamethoxazole      Throat swelling    Synalgos Dc     Donnatal [Belladonna Alk-Phenobarbital]     Gantrisin [Sulfisoxazole]     Sudafed [Pseudoephedrine]     Trazodone     Ampicillin     Bactrim [Sulfamethoxazole W/Trimethoprim]     Dimetapp Cold-Allergy [Brompheniramine-Phenylephrine]     Effexor [Venlafaxine]      Headache      Mycostatin [Nystatin]     Radiology Contrast Iodinated Dyes RASH     MRI contrast per patient

## 2025-04-14 NOTE — PATIENT INSTRUCTIONS
Plan:  Continue Xarelto and Aspirin 81 mg daily - monitor for bleeding, avoid NSAIDS  Allergic to statin  Referral to Psychology for therapist/counseling services  Referral to Orthopedics for hip pain  Follow up with your Cardiologist and PCP for management of stroke risk factors  Follow up with Dr. Hoyt in 6 months   Go to the ER for any new or worsening symptoms and contact the office    Counseled and educated on secondary stroke prevention:  Hypertension - target blood pressure less than 130/80   Hyperlipidemia - Goals: total cholesterol less than 200, LDL less than 70, HDL greater than 45, triglycerides less than 150  Your LDL is 166  Diabetes - target HgA1c less than 7%  Your A1c is at goal - 5.9  Physical inactivity - target exercise at least 3 times per week for a total of 150 minutes  Participate as well as you can given your hip pain  Diet - recommend Mediterranean diet with an emphasis on lean protein and vegetables    Refill policies:    Allow 2-3 business days for refills; controlled substances may take longer.  Contact your pharmacy at least 5 days prior to running out of medication and have them send an electronic request or submit request through the “request refill” option in your [employer] account.  Refills are not addressed on weekends; covering physicians do not authorize routine medications on weekends.  No narcotics or controlled substances are refilled after noon on Fridays or by on call physicians.  By law, narcotics must be electronically prescribed.  A 30 day supply with no refills is the maximum allowed.  If your prescription is due for a refill, you may be due for a follow up appointment.  To best provide you care, patients receiving routine medications need to be seen at least once a year.  Patients receiving narcotic/controlled substance medications need to be seen at least once every 3 months.  In the event that your preferred pharmacy does not have the requested medication in stock  (e.g. Backordered), it is your responsibility to find another pharmacy that has the requested medication available.  We will gladly send a new prescription to that pharmacy at your request.    Scheduling Tests:    If your physician has ordered radiology tests such as MRI or CT scans, please contact Central Scheduling at [phone] right away to schedule the test.  Once scheduled, the [facility] Centralized Referral Team will work with your insurance carrier to obtain pre-certification or prior authorization.  Depending on your insurance carrier, approval may take 3-10 days.  It is highly recommended patients assure they have received an authorization before having a test performed.  If test is done without insurance authorization, patient may be responsible for the entire amount billed.      Precertification and Prior Authorizations:  If your physician has recommended that you have a procedure or additional testing performed the [facility] Centralized Referral Team will contact your insurance carrier to obtain pre-certification or prior authorization.    You are strongly encouraged to contact your insurance carrier to verify that your procedure/test has been approved and is a COVERED benefit.  Although the [facility] Centralized Referral Team does its due diligence, the insurance carrier gives the disclaimer that \"Although the procedure is authorized, this does not guarantee payment.\"    Ultimately the patient is responsible for payment.   Thank you for your understanding in this matter.  Paperwork Completion:  If you require FMLA or disability paperwork for your recovery, please make sure to either drop it off or have it faxed to our office at [phone]. Be sure the form has your name and date of birth on it.  The form will be faxed to our Forms Department and they will complete it for you.  There is a 25$ fee for all forms that need to be filled out.  Please be aware there is a 10-14 day turnaround time.  You will need to sign a  release of information (SHELLEY) form if your paperwork does not come with one.  You may call the Forms Department with any questions at [phone].  Their fax number is [phone].

## 2025-04-21 ENCOUNTER — TELEPHONE (OUTPATIENT)
Age: OVER 89
End: 2025-04-21

## 2025-04-21 RX ORDER — LEVOTHYROXINE SODIUM 50 UG/1
50 TABLET ORAL
Qty: 90 TABLET | Refills: 0 | Status: SHIPPED | OUTPATIENT
Start: 2025-04-21

## 2025-04-21 NOTE — TELEPHONE ENCOUNTER
Angelic Kruger, [facility]    [address]    [place] [address]    [phone]      [facility] (grief therapist who also does a DBT/ coping skills group)    [address]    [address]    [place] [address]     [phone]      Abby Judd, [facility]    [address] [place] [address]    [phone]      [facility]    [address] [address]    [phone]   Post-Care Instructions: I reviewed with the patient in detail post-care instructions. Patient is to wear sunprotection, and avoid picking at any of the treated lesions. Pt may apply Vaseline to crusted or scabbing areas. Detail Level: Simple Render Post-Care Instructions In Note?: yes Duration Of Freeze Thaw-Cycle (Seconds): 0 Consent: The patient's consent was obtained including but not limited to risks of crusting, scabbing, blistering, scarring, darker or lighter pigmentary change, recurrence, incomplete removal and infection. Number Of Freeze-Thaw Cycles: 1 freeze-thaw cycle

## 2025-04-21 NOTE — TELEPHONE ENCOUNTER
A refill request was received for:  Requested Prescriptions     Pending Prescriptions Disp Refills    LEVOTHYROXINE 50 MCG Oral Tab [Pharmacy Med Name: Levothyroxine Sodium 50 Mcg Tab Lupi] 90 tablet 0     Sig: Take 1 tablet (50 mcg total) by mouth before breakfast.       Last refill date:  01/23/25     Last office visit: 02/19/25    Future Appointments   Date Time Provider Department Center   9/17/2025  2:00 PM Doug Hoyt MD [facility]

## 2025-05-08 ENCOUNTER — TELEPHONE (OUTPATIENT)
Dept: FAMILY MEDICINE CLINIC | Facility: CLINIC | Age: OVER 89
End: 2025-05-08

## 2025-05-08 NOTE — TELEPHONE ENCOUNTER
Patient calling stating her anxiety is getting worse, states she had mohs surgery scheduled today for removal of one spot and she panicked and they had to cancel appt because she \"freaked out\"- she said she has two spots that need to be removed  and she is currently scheduled for removal of both spots next Wednesday morning.  She needs guidance, tried to schedule Ban for first avail   (5/29) but that is too late.  She said she is freaking out because the spots are on her face and that cause major anxiety. Patient also asking is Delmi would be a good alternative, does she deal with aging clients and anxiety, please call. - Please call patient on home phone [phone]

## 2025-05-09 NOTE — TELEPHONE ENCOUNTER
Pt said her surgery is scheduled for 5/14 - she would like to see Mary before surgery date.  Pls advise

## 2025-05-12 NOTE — BH PROGRESS NOTE
Behavioral Health Note  CHIEF COMPLAINT  Aphasia    REASON FOR ADMISSION  Aphasia    SOCIAL HISTORY  Todd Albarado lives at home alone. She does not smoke,  drink alcohol or use drugs. PAST PSYCHIATRIC HISTORY  Todd Albarado has a h/o anxiety with panic attacks.  She h
Non Face-to-Face

## 2025-06-09 ENCOUNTER — TELEPHONE (OUTPATIENT)
Dept: FAMILY MEDICINE CLINIC | Facility: CLINIC | Age: OVER 89
End: 2025-06-09

## 2025-06-09 NOTE — TELEPHONE ENCOUNTER
Do we have the brace you were going to give her at the office visit?  Can we provide for pt to ?

## 2025-07-06 ENCOUNTER — HOSPITAL ENCOUNTER (EMERGENCY)
Facility: HOSPITAL | Age: OVER 89
Discharge: HOME | End: 2025-07-06
Attending: EMERGENCY MEDICINE
Payer: MEDICARE

## 2025-07-06 ENCOUNTER — HOSPITAL ENCOUNTER (OUTPATIENT)
Age: OVER 89
Discharge: ANOTHER HOSPITAL | End: 2025-07-06
Payer: MEDICARE

## 2025-07-06 ENCOUNTER — APPOINTMENT (OUTPATIENT)
Dept: CT IMAGING | Facility: HOSPITAL | Age: OVER 89
End: 2025-07-06
Attending: EMERGENCY MEDICINE
Payer: MEDICARE

## 2025-07-06 VITALS
SYSTOLIC BLOOD PRESSURE: 179 MMHG | HEIGHT: 62 IN | TEMPERATURE: 98 F | OXYGEN SATURATION: 99 % | RESPIRATION RATE: 23 BRPM | DIASTOLIC BLOOD PRESSURE: 76 MMHG | HEART RATE: 70 BPM | WEIGHT: 144 LBS | BODY MASS INDEX: 26.5 KG/M2

## 2025-07-06 VITALS
TEMPERATURE: 98 F | SYSTOLIC BLOOD PRESSURE: 173 MMHG | DIASTOLIC BLOOD PRESSURE: 69 MMHG | OXYGEN SATURATION: 97 % | HEART RATE: 79 BPM | RESPIRATION RATE: 16 BRPM

## 2025-07-06 DIAGNOSIS — H53.9 VISUAL DISTURBANCE: Primary | ICD-10-CM

## 2025-07-06 DIAGNOSIS — G44.52 NEW DAILY PERSISTENT HEADACHE: ICD-10-CM

## 2025-07-06 DIAGNOSIS — I10 HYPERTENSION, UNSPECIFIED TYPE: ICD-10-CM

## 2025-07-06 DIAGNOSIS — H57.11 PAIN OF RIGHT EYE: Primary | ICD-10-CM

## 2025-07-06 PROCEDURE — 99213 OFFICE O/P EST LOW 20 MIN: CPT

## 2025-07-06 PROCEDURE — 99284 EMERGENCY DEPT VISIT MOD MDM: CPT

## 2025-07-06 PROCEDURE — 99212 OFFICE O/P EST SF 10 MIN: CPT

## 2025-07-06 PROCEDURE — 70450 CT HEAD/BRAIN W/O DYE: CPT | Performed by: EMERGENCY MEDICINE

## 2025-07-06 RX ORDER — FLUORESCEIN SODIUM 1 MG/MG
1 STRIP OPHTHALMIC ONCE
Status: COMPLETED | OUTPATIENT
Start: 2025-07-06 | End: 2025-07-06

## 2025-07-06 RX ORDER — FLUORESCEIN SODIUM 1 MG/MG
STRIP OPHTHALMIC
Status: COMPLETED
Start: 2025-07-06 | End: 2025-07-06

## 2025-07-06 RX ORDER — TOBRAMYCIN 3 MG/ML
2 SOLUTION/ DROPS OPHTHALMIC
Qty: 1 EACH | Refills: 0 | Status: SHIPPED | OUTPATIENT
Start: 2025-07-06 | End: 2025-07-11

## 2025-07-06 NOTE — ED INITIAL ASSESSMENT (HPI)
Patient reports some right eye pain associated with \"feeling like the sun is brighter\" or inside lighting is brighter. States she has had a headache at times as well as elevated blood pressure. States she sometimes has nausea as well.

## 2025-07-06 NOTE — ED INITIAL ASSESSMENT (HPI)
Pt presents to ER with right eye pain. \"Everything seemed brighter\" with the right eye. No vision changes. Symptoms started about 2 weeks. Pt noticed elevated blood pressure 179 systolic between [id number]. Pt states laying down, but elevated blood pressure returned and pain to right eye. Yes nausea. No n/v. No \"adrenaline\" feeling at this time. No pain at this time. Pt is a&ox3. Skin warm and dry. Pt states no SOB, but does feel anxious.

## 2025-07-06 NOTE — ED PROVIDER NOTES
Patient Seen in: [facility]       The following individual(s) verbally consented to be recorded using ambient AI listening technology and understand that they can each withdraw their consent to this listening technology at any point by asking the clinician to turn off or pause the recording:    Patient name: Bella Kirby        History  Chief Complaint   Patient presents with    Eye Visual Problem     Stated Complaint: Eye Problem Pain    Subjective:   HPI     Vania Kirby is an 89 year old female with hypertension who presents with eye pain and changes in light perception.  Patient states that when she is outside the sunlight appears brighter in her bathroom and home lights appear brighter.  Patient denies any head injury.  Patient states that the brightness is causing her to have a constant headache.    She has experienced significant eye pain since yesterday, which began at the edge of the eye and spread to the rest of it. This pain is associated with changes in her perception of light over the past one and a half to two weeks, with things appearing brighter, such as bathroom lights and sunlight.    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 No recent falls or head injuries are reported.    She also experiences a mild headache and some nausea accompanying her symptoms.    Her blood pressure was elevated today at 173/69 mmHg. She took her blood pressure medication, which initially helped her feel calmer, but her symptoms persisted. She describes feeling like her 'adrenaline was insane' earlier in the day.        Objective:     Past Medical History:    Acute, but ill-defined, cerebrovascular disease    Allergic rhinitis    Anxiety    Arthritis    Back, hips, hands    Atherosclerosis of coronary artery    Back injury    Bone spur of other site    R shoulder    Essential hypertension    Hyperlipidemia    Infection of tooth     Osteoarthritis    Stroke (HCC)    Thyroid disease    TIA (transient ischemic attack)    19              Past Surgical History:   Procedure Laterality Date    Appendectomy      Hysterectomy      partial      3    Other surgical history Left     leg, cancer cell removal    Skin surgery  11/10/2021    BCC left sandy of jaw mohs by Dr Victor    Tonsillectomy                  Social History     Socioeconomic History    Marital status:    Tobacco Use    Smoking status: Never    Smokeless tobacco: Never   Vaping Use    Vaping status: Never Used   Substance and Sexual Activity    Alcohol use: Never    Drug use: Never   Other Topics Concern    Caffeine Concern Yes     Comment: tea all day long    Exercise Yes     Comment: walking    Seat Belt Yes    Special Diet No    Stress Concern Yes    Weight Concern No     Social Drivers of Health     Food Insecurity: No Food Insecurity (7/3/2024)    Food Insecurity     Food Insecurity: Never true   Transportation Needs: No Transportation Needs (2024)    Transportation Needs     Lack of Transportation: No   Housing Stability: Low Risk  (7/3/2024)    Housing Stability     Housing Instability: No              Review of Systems   Constitutional: Negative.    HENT: Negative.     Eyes:  Positive for photophobia and visual disturbance.   Respiratory: Negative.     Cardiovascular: Negative.    Gastrointestinal: Negative.    Endocrine: Negative.    Genitourinary: Negative.    Musculoskeletal: Negative.    Allergic/Immunologic: Negative.    Neurological:  Positive for headaches. Negative for weakness.   Hematological: Negative.    Psychiatric/Behavioral: Negative.         Positive for stated complaint: Eye Problem Pain  Other systems are as noted in HPI.  Constitutional and vital signs reviewed.      All other systems reviewed and negative except as noted above.                  Physical Exam    ED Triage Vitals [25 1511]   BP (!) 173/69   Pulse 79   Resp 16   Temp  98.2 °F (36.8 °C)   Temp src Oral   SpO2 97 %   O2 Device None (Room air)       Current Vitals:   Vital Signs  BP: (!) 173/69  Pulse: 79  Resp: 16  Temp: 98.2 °F (36.8 °C)  Temp src: Oral    Oxygen Therapy  SpO2: 97 %  O2 Device: None (Room air)            Physical Exam  Vitals and nursing note reviewed.   Constitutional:       Appearance: Normal appearance.   HENT:      Head: Normocephalic and atraumatic.      Comments: Patient has a Band-Aid on each cheek covering a skin biopsy due to stated history of skin cancer removal.  Eyes:      Extraocular Movements: Extraocular movements intact.      Conjunctiva/sclera: Conjunctivae normal.      Pupils: Pupils are equal, round, and reactive to light.   Pulmonary:      Effort: Pulmonary effort is normal.   Musculoskeletal:      Cervical back: Normal range of motion and neck supple.   Neurological:      Mental Status: She is alert and oriented to person, place, and time.      Motor: No weakness.      Gait: Gait normal.   Psychiatric:         Mood and Affect: Mood normal.               ED Course  Labs Reviewed - No data to display                         MDM     Vania Kirby is an 89 year old female with hypertension who presents with eye pain and changes in light perception.  Patient states that when she is outside the sunlight appears brighter in her bathroom and home lights appear brighter.  Patient denies any head injury.  Patient states that the brightness is causing her to have a constant headache.    She has experienced significant eye pain since yesterday, which began at the edge of the eye and spread to the rest of it. This pain is associated with changes in her perception of light over the past one and a half to two weeks, with things appearing brighter, such as bathroom lights and sunlight.    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 No recent falls or head injuries are reported.    She also  experiences a mild headache and some nausea accompanying her symptoms.    Her blood pressure was elevated today at 173/69 mmHg. She took her blood pressure medication, which initially helped her feel calmer, but her symptoms persisted. She describes feeling like her 'adrenaline was insane' earlier in the day.  Vital signs: Please see EMR.  Physical exam: Please see exam.  Differential diagnosis: Intracranial process, stroke, sinus headache, corneal abrasion.  Due to the perception of light being different to the patient causing headache and elevated blood pressure I am unable to rule out stroke in this setting. Patient to be referred to the emergency room for further evaluation to rule out strokelike symptoms.  Patient has declined an ambulance.  Patient states that she will take herself to the [place] emergency department.  We no longer have CT or CTA capabilities at this clinic at this hour on the Sunday afternoon.        Medical Decision Making  Vania Kirby is an 89 year old female with hypertension who presents with eye pain and changes in light perception.  Patient states that when she is outside the sunlight appears brighter in her bathroom and home lights appear brighter.  Patient denies any head injury.  Patient states that the brightness is causing her to have a constant headache.    She has experienced significant eye pain since yesterday, which began at the edge of the eye and spread to the rest of it. This pain is associated with changes in her perception of light over the past one and a half to two weeks, with things appearing brighter, such as bathroom lights and sunlight.    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 No recent falls or head injuries are reported.    She also experiences a mild headache and some nausea accompanying her symptoms.    Her blood pressure was elevated today at 173/69 mmHg. She took her blood  pressure medication, which initially helped her feel calmer, but her symptoms persisted. She describes feeling like her 'adrenaline was insane' earlier in the day.    Risk  Decision regarding hospitalization.        Disposition and Plan     Clinical Impression:  1. Visual disturbance    2. New daily persistent headache    3. Hypertension, unspecified type         Disposition:  Discharge  7/6/2025  3:22 pm    Follow-up:  No follow-up provider specified.        Medications Prescribed:  Current Discharge Medication List                Supplementary Documentation:                                                                  unk

## 2025-07-06 NOTE — DISCHARGE INSTRUCTIONS
VISIT SUMMARY:  Today, you came in because you were experiencing significant eye pain and changes in your perception of light. You also had a mild headache, some nausea, and elevated blood pressure. Given your symptoms, the doctor is concerned about the possibility of a stroke or another serious condition affecting your brain.    YOUR PLAN:  -POSSIBLE STROKE SYMPTOMS: Your symptoms, including eye pain, changes in light perception, headache, nausea, and high blood pressure, could indicate a stroke or another serious condition affecting your brain. You need to go to the emergency room for further evaluation and possibly get brain imaging to rule out any serious issues. Please drive safely to the hospital and call 911 if you have any trouble while driving.    -HYPERTENSION: Your blood pressure was high today, which can contribute to headaches and changes in vision. It's important to monitor your blood pressure closely and make sure you take your blood pressure medication as prescribed.    INSTRUCTIONS:  Please go to the emergency room immediately for further evaluation. Drive safely and call 911 if you experience any difficulties while driving.    Contains text generated by Emily

## 2025-07-06 NOTE — ED PROVIDER NOTES
Patient Seen in: [facility] Emergency Department        History  Chief Complaint   Patient presents with    Eye Pain    Headache     Stated Complaint: Per call in note: \" right eye pain, elevated BP, headache, nausea, lights appea*    Subjective:   HPI          89-year-old female who presents to the emergency department after being seen at the immediate care due to report of right eye pain.  She said she has been noticing some stinging discomfort along the lateral aspect of her right eye.  She says that she has been noticing over the past 2 weeks but light appears to be much brighter when she looks at it with the right eye.  She has had some slightly elevated blood pressure and slight headache.  Reviewing her records she was seen earlier today 2025 at immediate care and sent here for evaluation.  She denies any fevers or chills.        Objective:     Past Medical History:    Acute, but ill-defined, cerebrovascular disease    Allergic rhinitis    Anxiety    Arthritis    Back, hips, hands    Atherosclerosis of coronary artery    Back injury    Bone spur of other site    R shoulder    Cancer (HCC)    Essential hypertension    Hyperlipidemia    Infection of tooth    Osteoarthritis    Stroke (HCC)    Thyroid disease    TIA (transient ischemic attack)    19              Past Surgical History:   Procedure Laterality Date    Appendectomy      Hysterectomy      partial      3    Other surgical history Left     leg, cancer cell removal    Skin surgery  11/10/2021    BCC left sandy of jaw mohs by Dr Victor    Tonsillectomy                  Social History     Socioeconomic History    Marital status:    Tobacco Use    Smoking status: Never    Smokeless tobacco: Never   Vaping Use    Vaping status: Never Used   Substance and Sexual Activity    Alcohol use: Never    Drug use: Never   Other Topics Concern    Caffeine Concern Yes     Comment: tea all day long    Exercise Yes     Comment: walking     Seat Belt Yes    Special Diet No    Stress Concern Yes    Weight Concern No     Social Drivers of Health     Food Insecurity: No Food Insecurity (7/3/2024)    Food Insecurity     Food Insecurity: Never true   Transportation Needs: No Transportation Needs (7/5/2024)    Transportation Needs     Lack of Transportation: No   Housing Stability: Low Risk  (7/3/2024)    Housing Stability     Housing Instability: No                                Physical Exam    ED Triage Vitals [07/06/25 1608]   BP (!) 178/80   Pulse 80   Resp 18   Temp 98.4 °F (36.9 °C)   Temp src Oral   SpO2 100 %   O2 Device None (Room air)       Current Vitals:   Vital Signs  BP: (!) 179/76  Pulse: 70  Resp: 23  Temp: 98.4 °F (36.9 °C)  Temp src: Oral  MAP (mmHg): (!) 106    Oxygen Therapy  SpO2: 99 %  O2 Device: None (Room air)      Right Eye Chart Acuity: 20/40, Corrected  Left Eye Chart Acuity: 20/40, Corrected      Physical Exam  General: This a pleasant nontoxic appearing patient in no apparent distress alert and oriented ×3  HEENT: Pupils are equal reactive to light.  Extra ocular motions are intact.  No scleral icterus or conjunctival pallor: No corneal abrasions on staining the cornea.  No foreign bodies on lid eversion.  There is slight erythema involving the right sclera.  Neck is supple without tenderness on palpation.  Head is atraumatic normocephalic.  Oral mucosa moist.  Tongue is midline.  No posterior pharyngeal lesions.   Lungs: Clear to auscultation bilaterally.  No wheezes, rhonchi, or rales appreciated.  No accessory muscle use noted for breathing.  Cardiac: Regular rate and rhythm.  Normal S1 and 2 without murmurs or ectopy appreciated  Abdomen: Soft on examination without tenderness to deep palpation or to percussion.  No masses appreciated.  Bowel sounds are normoactive.  No CVA tenderness.  Extremities: No cyanosis, no edema or clubbing.  Pulses are +2.  Full range of motion is noted of the extremities without deformities.  No  tenderness.  Neurologically intact.        ED Course  Labs Reviewed - No data to display  Narrative  PROCEDURE: CT BRAIN OR HEAD (CPT=70450)    INDICATIONS: Per call in note:  right eye pain, elevated BP, headache, nausea, lights appearing brighter to patient. needs imaging    COMPARISON: MRI brain from 7/2/2024.    TECHNIQUE: Noncontrast CT scanning is performed through the brain. Automated exposure control techniques for dose reduction were used. Dose information is transmitted to the ACR (American College of Radiology) NRDR (National Radiology Data Registry)  which includes the Dose Index Registry.    FINDINGS:    VENTRICLES/SULCI: Ventricles and sulci are normal in size.    INTRACRANIAL:       There are no abnormal extraaxial fluid collections.  There is no midline shift. Minimal chronic small vessel ischemic disease. There is no specific evidence of an acute infarct.  There is no hemorrhage or mass lesion.    SINUSES:                 No sign of acute sinusitis.    MASTOIDS:             No sign of acute inflammation.    SKULL:                    No evidence for fracture or osseous abnormality.    OTHER:                    None   Impression  CONCLUSION:    No acute intracranial hemorrhage or hydrocephalus.    Minimal chronic small vessel ischemic disease. If there is clinical concern for acute ischemia/infarction, an MRI of the brain would be recommended for further evaluation.      Electronically Verified and Signed by Attending Radiologist: LeRoy Stromberg MD 7/6/2025 7:13 PM  Workstation: [id number]                       MDM   Differential diagnose includes was not limited to corneal abrasion, conjunctivitis, episcleritis, intracranial mass, ocular mass.  Patient had a CT scan performed.  I reviewed the x-rays and agree with the radiologist report that showed atrophy noted, no acute intracranial bleed or mass.  Minimal chronic small vessel ischemic disease.  The patient had normal visual acuity in both of her  eyes 20/40 corrected bilaterally.  Continue to follow as an outpatient with primary care physician written for antibiotic eyedrops for her conjunctival irritation there may be some scattering of light through the inflamed right eye.  She can follow-up with ophthalmology as an outpatient return if symptoms progress or worsen.  Her to return here if she has any worsening symptoms.  Discharge good condition.  Note to Patient  The 21st Century Cures Act makes medical notes like these available to patients in the interest of transparency. However, be advised this is a medical document and is intended as [id number] communication; it is written in medical language and may appear blunt, direct, or contain abbreviations or verbiage that are unfamiliar. Medical documents are intended to carry relevant information, facts as evident, and the clinical opinion of the practitioner.  Patient was evaluated and a screening exam was performed.   As a treating physician attending to the patient, I determined, within reasonable clinical confidence and prior to discharge, that an emergency medical condition was not or was no longer present.  There was no indication for further evaluation, treatment or admission on an emergency basis.  Comprehensive verbal and written discharge and follow-up instructions were provided to help prevent relapse or worsening.  Patient was instructed to follow-up with their primary care provider for further evaluation and treatment, but to return immediately to the ER for worsening, concerning, new, changing or persisting symptoms.  I discussed the case with the patient and they had no questions, complaints, or concerns.  Patient felt comfortable going home.  ^^Please note that this report has been produced using speech recognition software and may contain errors related to that system including, but not limited to, errors in grammar, punctuation, and spelling, as well as words and phrases that possibly may  have been recognized inappropriately.  If there are any questions or concerns, contact the dictating provider for clarification.  Medical Decision Making      Disposition and Plan     Clinical Impression:  1. Pain of right eye         Disposition:  Discharge  7/6/2025  6:30 pm    Follow-up:  Avery Elias DO  [address] [place] [address]  [address]  [facility] [address] [phone]    Schedule an appointment as soon as possible for a visit in 2 day(s)      Harley Kramer MD  [address] [facility]  [address]  [facility] [address] [phone]    Schedule an appointment as soon as possible for a visit in 2 day(s)            Medications Prescribed:  Discharge Medication List as of 7/6/2025  6:33 PM        START taking these medications    Details   tobramycin 0.3 % Ophthalmic Solution Apply 2 drops to eye every 2 (two) hours while awake for 5 days., Normal, Disp-1 each, R-0                   Supplementary Documentation:

## 2025-07-07 ENCOUNTER — PATIENT OUTREACH (OUTPATIENT)
Age: OVER 89
End: 2025-07-07

## 2025-07-07 NOTE — PROGRESS NOTES
Transitions of Care Navigation  Discharge Date: 25  Contact Date: 2025    Transitions of Care Assessment:       25 1358   MARISA Assessment   Assessment Type MARISA Initial   Assessment completed with Patient   Patient Subjective Spoke with patient. Patient reports feeling better. The patient reports the pain in the right eye as resolved. The patient is scheduled with the [facility] tomorrow for a follow up. Has not picked up the newly prescribed eye drops (seen Nursing Interventions). The patient denies headaches, dizziness, balance issues, worsening vision changes, facial drooping, numbness or weakness one side of her face or body, speech difficulty. The patient denies any new or worsening symptoms.   Chief Complaint Primary Diagnosis Pain of right eye   MARISA Navigation Initial Assessment   Verify patient name and  with patient/ caregiver Yes   Tell me what you understand of why you were in the hospital or emergency department stinging discomfort along the lateral aspect of her right eye   Prior to leaving the hospital were your Discharge Instructions reviewed with you? Yes   Did you receive a copy of your written Discharge Instructions? Yes   What questions do you have about your Discharge Instructions? Denies   Do you feel better or worse since you left the hospital or emergency department? Better   Do you have a follow-up appointment? Yes   Date 25   Physician [facility]   Are there any barriers to getting to your follow-up appointment? No   Prior to leaving the hospital was Home Health (HH) arranged for you? N/A   Are HH needs identified by staff during the assessment? No   Prior to leaving the hospital or emergency department was Durable Medical Equipment (DME), medical supplies, or infusions arranged for you? N/A   Are DME/medical supply/infusions needs identified by staff during this assessment? No   Did any of your medications change, during or after your hospital stay or ED  visit? Yes   Do you have your new or updated medications? No  (Patient reports she was not notified of a new prescription)   Do you understand what your medications are for and possible side effects? Yes   Are there any reasons that keep you from taking your medication as prescribed? No   Any concerns about medication refills? No   Were you given a different diet per your Discharge Instructions? No   Do you have any questions or concerns that have not been discussed? No         Nursing Interventions:    An assessment was completed with the patient. Nurse Navigator called [facility] and spoke with pharmacist Lisha- Tobramycin eye drops are ready for . RN called patient back and provided update- patient verbalized understanding.   The patient declined appointment with her PCP at this time as she is seeing Ophthalmology tomorrow 07/08 ([facility]).   Patient has not picked up the prescribed Tobramycin eye drops- states she did not receive a message that any prescription was ready for .   All d/c instructions reviewed with pt.  Reviewed when to call MD vs when to go to ER/call 911.  Educated pt on the importance of taking all meds as prescribed as well as close f/u with PCP/specialists.  Pt verbalized understanding and will contact office with any further questions or concerns.

## 2025-07-13 ENCOUNTER — HOSPITAL ENCOUNTER (EMERGENCY)
Age: OVER 89
Discharge: HOME | End: 2025-07-13
Attending: EMERGENCY MEDICINE
Payer: MEDICARE

## 2025-07-13 VITALS
DIASTOLIC BLOOD PRESSURE: 71 MMHG | HEIGHT: 62 IN | BODY MASS INDEX: 26.5 KG/M2 | HEART RATE: 81 BPM | SYSTOLIC BLOOD PRESSURE: 149 MMHG | OXYGEN SATURATION: 96 % | WEIGHT: 144 LBS | RESPIRATION RATE: 16 BRPM | TEMPERATURE: 98 F

## 2025-07-13 DIAGNOSIS — Z51.89 VISIT FOR WOUND CHECK: Primary | ICD-10-CM

## 2025-07-13 PROCEDURE — 99282 EMERGENCY DEPT VISIT SF MDM: CPT

## 2025-07-13 PROCEDURE — 99283 EMERGENCY DEPT VISIT LOW MDM: CPT

## 2025-07-13 NOTE — ED PROVIDER NOTES
Patient Seen in: [facility] In [place]        History  Chief Complaint   Patient presents with    Cellulitis     Stated Complaint: worried about infection to right facial incision (hx skin CA)    Subjective:   HPI            CHIEF COMPLAINT: Wound check, concerned about cellulitis     HISTORY OF PRESENT ILLNESS: Patient is an 89-year-old female presenting for evaluation of a wound check.  Patient gives history that she had to separate Mohs procedures on her face, on her right cheek in May and on her left cheek in June.  She states that she has recently noticed sutures poking out of the right cheek incision.  She also feels that it is irritated or red.  The left cheek had a tiny bit of bleeding from it a few days ago.  She states she did not call her Mohs surgeon.  She was going to give it a few days and then call them but today she started thinking about it and was worried that there could be infection so decided to get evaluated today.  No fever or chills.  Patient is not diabetic.     REVIEW OF SYSTEMS:  Constitutional: no fever, no chills  Skin: As above  Neurological: no headache     Otherwise a complete review of systems was obtained and other than the HPI was negative     The patient's medication list, past medical history and social history elements is as listed in today's nurse's notes are reviewed and agree. The patient's family history is reviewed and is noncontributory to the presenting problem, except as indicated as above.      Objective:     Past Medical History:    Acute, but ill-defined, cerebrovascular disease    Allergic rhinitis    Anxiety    Arthritis    Back, hips, hands    Atherosclerosis of coronary artery    Back injury    Bone spur of other site    R shoulder    Cancer (HCC)    Essential hypertension    Hyperlipidemia    Infection of tooth    Osteoarthritis    Stroke (HCC)    Thyroid disease    TIA (transient ischemic attack)    6/23/19              Past Surgical  History:   Procedure Laterality Date    Appendectomy      Hysterectomy      partial      3    Other surgical history Left     leg, cancer cell removal    Skin surgery  11/10/2021    BCC left sandy of jaw mohs by Dr Victor    Tonsillectomy                  Social History     Socioeconomic History    Marital status:    Tobacco Use    Smoking status: Never    Smokeless tobacco: Never   Vaping Use    Vaping status: Never Used   Substance and Sexual Activity    Alcohol use: Never    Drug use: Never   Other Topics Concern    Caffeine Concern Yes     Comment: tea all day long    Exercise Yes     Comment: walking    Seat Belt Yes    Special Diet No    Stress Concern Yes    Weight Concern No     Social Drivers of Health     Food Insecurity: No Food Insecurity (7/3/2024)    Food Insecurity     Food Insecurity: Never true   Transportation Needs: No Transportation Needs (2024)    Transportation Needs     Lack of Transportation: No   Housing Stability: Low Risk  (7/3/2024)    Housing Stability     Housing Instability: No                                Physical Exam    ED Triage Vitals [25 1600]   /71   Pulse 81   Resp 16   Temp 98 °F (36.7 °C)   Temp src    SpO2 96 %   O2 Device None (Room air)       Current Vitals:   Vital Signs  BP: 149/71  Pulse: 81  Resp: 16  Temp: 98 °F (36.7 °C)    Oxygen Therapy  SpO2: 96 %  O2 Device: None (Room air)            Physical Exam  Vital signs and nursing notes reviewed  General Appearance: No acute distress  Neurological:  A&Ox3,  Gait normal.  Psychiatric: calm and cooperative  Respiratory: CTAB  Cardiovascular: RRR, S1/S2, no m/c/r  Musculoskeletal: Extremities are symmetrical, full range of motion  Skin:  warm and dry,   On the right cheek there is a well-healed incision.  2 clear sutures are poking through the incision.  No surrounding warmth, erythema or edema.  No bleeding or drainage.  On the left cheek there is a well-healed incision.  No surrounding  warmth or erythema.  No bleeding or draining.      ED Course  Labs Reviewed - No data to display       Differential diagnosis is cellulitis, wound dehiscence, irritation from retained suture                  MDM     This is a well-appearing 89-year-old female presenting for evaluation of 2 incisions on her face.  She is about 8 weeks out from a Mohs procedure on her right cheek and 4 weeks out from the Mohs procedure on the left cheek.  Right cheek has a retained suture that is poking out of the skin.  No surrounding cellulitis but may be some slight inflammatory component to the retained suture.  Left incision appears well.  No evidence of cellulitis today.  Recommend the patient continue to use Vaseline on the wounds twice daily.  She can follow with her Mohs surgeon next week.  If there are any new, changing or worsening symptoms go to the ER.  Patient voiced understanding to the treatment plan.  All questions answered.  This patient was seen and examined with Dr. Benton, who agrees with the disposition and plan.        Medical Decision Making  Risk  OTC drugs.        Disposition and Plan     Clinical Impression:  1. Visit for wound check         Disposition:  Discharge  7/13/2025  4:22 pm    Follow-up:  Clemencia Victor MD  [address] [address]  [phone]    Call in 1 day(s)            Medications Prescribed:  Discharge Medication List as of 7/13/2025  4:29 PM                Supplementary Documentation:

## 2025-07-13 NOTE — DISCHARGE INSTRUCTIONS
Use the topical mupirocin you were prescribed by your dermatologist.  You can continue using Vaseline 1-2 times daily as well.    Follow-up with your Mohs surgeon.  If you have new, changing or worsening symptoms return for reevaluation.

## 2025-07-14 ENCOUNTER — PATIENT OUTREACH (OUTPATIENT)
Age: OVER 89
End: 2025-07-14

## 2025-07-14 NOTE — PROGRESS NOTES
This writer contacted patient at [phone]  for MARISA Navigation post-hospital discharge follow up call on 7/14/2025 at 1:20 PM. There was no answer and I was unable to reach the patient at this time. I left a nondescript message with my contact information and the reason for my call on voicemail.

## 2025-07-15 ENCOUNTER — TELEPHONE (OUTPATIENT)
Dept: FAMILY MEDICINE CLINIC | Facility: CLINIC | Age: OVER 89
End: 2025-07-15

## 2025-07-15 NOTE — TELEPHONE ENCOUNTER
See below, pt saw derm after ER (was not admitted)    No spots in that timeline, (there was an opening prior it looks but the RN was not able to book it per below?)    Can we use an SDA for a follow up, then add to wait list? Next SDA is 7/23 so  far.    Please advise thx

## 2025-07-15 NOTE — TELEPHONE ENCOUNTER
Spoke to patient for Transitions of Care call today.      The patient followed up with her Dermatologist yesterday for an ER follow up. The patient is also requesting an appointment with Astrid GOODRICH for an ER follow up. Nurse Navigator attempted to schedule an appointment however a red security box pops up and I am unable to schedule this appointment       ER Follow-up appointment needed by 07/20/2025 .  Please advise.    BOOK BY DATE: 07/20/2025    Clinical staff:  Please follow-up with patient and try to get them to schedule as patient would greatly benefit from ER Follow-up.  Thank you!

## 2025-07-15 NOTE — PROGRESS NOTES
Transitions of Care Navigation  Discharge Date: 25  Contact Date: 2025    Transitions of Care Assessment:       07/15/25 0906   MARISA Assessment   Assessment Type MARISA Initial   Assessment completed with Patient   Patient Subjective Spoke with patient. The patient reports she went to see the Dermatologist yesterday for a follow up. The patient reports she was advised by her Dermatologist that they will not remove the sutures at this time and to not use the bandaid.  The patient states she was advised to use only Vaseline. The patient states she was told there is no infection by her Dermatologist. The patient denies chest pain, shortness of breath, fever, chills, nausea, vomiting, diarrhea.   Chief Complaint Primary Diagnosis Visit for wound check   MARISA Navigation Initial Assessment   Verify patient name and  with patient/ caregiver Yes   Tell me what you understand of why you were in the hospital or emergency department patient reports she was concerned that she had an infection   Prior to leaving the hospital were your Discharge Instructions reviewed with you? Yes   Did you receive a copy of your written Discharge Instructions? Yes   What questions do you have about your Discharge Instructions? Denies   Do you feel better or worse since you left the hospital or emergency department? Better   Do you have a follow-up appointment? Yes   Date 25  (appointment completed)   Physician Dermatology   Are there any barriers to getting to your follow-up appointment? No   Prior to leaving the hospital was Home Health (HH) arranged for you? N/A   Are HH needs identified by staff during the assessment? No   Prior to leaving the hospital or emergency department was Durable Medical Equipment (DME), medical supplies, or infusions arranged for you? N/A   Are DME/medical supply/infusions needs identified by staff during this assessment? No   Did any of your medications change, during or after your hospital stay or ED  visit? No   Do you understand what your medications are for and possible side effects? Yes   Are there any reasons that keep you from taking your medication as prescribed? No   Any concerns about medication refills? No   Were you given a different diet per your Discharge Instructions? No   Do you have any questions or concerns that have not been discussed? No         Nursing Interventions:    Nurse Navigator provided education on signs/symptoms of infection.   The patient completed a follow up with Dermatology yesterday.   The patient is also requesting an appointment with Astrid GOODRICH for an ER follow up. Nurse Navigator attempted to schedule an appointment however a red security box pops up and I am unable to schedule this appointment. A message was sent to the PCP office to assist and the patient is aware she will be contacted directly.   All d/c instructions reviewed with pt.  Reviewed when to call MD vs when to go to ER/call 911.  Educated pt on the importance of taking all meds as prescribed as well as close f/u with PCP/specialists.  Pt verbalized understanding and will contact office with any further questions or concerns. The patient declined further follow up calls from the Nurse Navigator.

## 2025-07-16 NOTE — TELEPHONE ENCOUNTER
Yes, okay to use SDA or whatever the earliest appt slot is. I reviewed ER notes and I think it's okay to wait for appt.

## 2025-07-24 ENCOUNTER — LAB ENCOUNTER (OUTPATIENT)
Dept: LAB | Age: OVER 89
End: 2025-07-24
Attending: PHYSICIAN ASSISTANT
Payer: MEDICARE

## 2025-07-24 ENCOUNTER — OFFICE VISIT (OUTPATIENT)
Dept: FAMILY MEDICINE CLINIC | Facility: CLINIC | Age: OVER 89
End: 2025-07-24
Payer: MEDICARE

## 2025-07-24 VITALS
SYSTOLIC BLOOD PRESSURE: 110 MMHG | OXYGEN SATURATION: 97 % | HEIGHT: 62 IN | HEART RATE: 2 BPM | DIASTOLIC BLOOD PRESSURE: 58 MMHG | WEIGHT: 144 LBS | RESPIRATION RATE: 14 BRPM | BODY MASS INDEX: 26.5 KG/M2

## 2025-07-24 DIAGNOSIS — M16.0 ARTHRITIS OF BOTH HIPS: ICD-10-CM

## 2025-07-24 DIAGNOSIS — Z48.89 SUTURE CHECK: ICD-10-CM

## 2025-07-24 DIAGNOSIS — F41.9 ANXIETY: ICD-10-CM

## 2025-07-24 DIAGNOSIS — E03.9 HYPOTHYROIDISM, UNSPECIFIED TYPE: ICD-10-CM

## 2025-07-24 DIAGNOSIS — E03.9 HYPOTHYROIDISM, UNSPECIFIED TYPE: Primary | ICD-10-CM

## 2025-07-24 LAB — TSI SER-ACNC: 0.91 UIU/ML (ref 0.55–4.78)

## 2025-07-24 PROCEDURE — 1160F RVW MEDS BY RX/DR IN RCRD: CPT | Performed by: PHYSICIAN ASSISTANT

## 2025-07-24 PROCEDURE — 3074F SYST BP LT 130 MM HG: CPT | Performed by: PHYSICIAN ASSISTANT

## 2025-07-24 PROCEDURE — 36415 COLL VENOUS BLD VENIPUNCTURE: CPT

## 2025-07-24 PROCEDURE — 3078F DIAST BP <80 MM HG: CPT | Performed by: PHYSICIAN ASSISTANT

## 2025-07-24 PROCEDURE — 84443 ASSAY THYROID STIM HORMONE: CPT

## 2025-07-24 PROCEDURE — 1159F MED LIST DOCD IN RCRD: CPT | Performed by: PHYSICIAN ASSISTANT

## 2025-07-24 PROCEDURE — 99214 OFFICE O/P EST MOD 30 MIN: CPT | Performed by: PHYSICIAN ASSISTANT

## 2025-07-24 PROCEDURE — 3008F BODY MASS INDEX DOCD: CPT | Performed by: PHYSICIAN ASSISTANT

## 2025-07-24 RX ORDER — LEVOTHYROXINE SODIUM 50 UG/1
50 TABLET ORAL
Qty: 90 TABLET | Refills: 0 | OUTPATIENT
Start: 2025-07-24

## 2025-07-24 RX ORDER — LEVOTHYROXINE SODIUM 50 UG/1
50 TABLET ORAL
Qty: 90 TABLET | Refills: 3 | Status: SHIPPED | OUTPATIENT
Start: 2025-07-24

## 2025-07-24 NOTE — PROGRESS NOTES
Subjective:   Bella Kirby is a 89 year old female who presents for ER F/U (Had severe pain of the right eye and BP was very high. Nothing found, everything was wnl /Did follow up at [facility] and they said everything looked ok. 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 Would like to discuss increasing or trying something else ), and Hip Pain (Ongoing Left hip pain -getting worse -she is not sure if she has ever had Xray of the hip )       History/Other:   History of Present Illness  Vania Kirby is an 89 year old female who presents with concerns about non-dissolving sutures and anxiety management.    Two months ago, she underwent a procedure and subsequently visited the ER due to concerns about infection. A dermatologist noted irritation but did not remove the stitches, explaining to the patient that removal might cause the wound to open. The sutures have not dissolved yet, and she experiences occasional redness around the area but no drainage or odor.    She experiences skipped heartbeats for a few days, which she describes as being aware of but not painful. A normal EKG was recorded a few months ago. She is on thyroid medication and has recently requested a refill, mentioning that her thyroid levels have not been checked in a long time.    She has a history of anxiety, which recently affected her during a driving test. She takes Lexapro daily at a dose of 15 mg but considered increasing it to 20 mg during a particularly stressful day. She is hesitant to use Valium, expressing concern about its effects when driving.    She reports left hip pain affecting her walking for a couple of months. An x-ray from 2018 showed moderate arthritis in both hips. She avoids NSAIDs and prefers not to take Tylenol frequently due to sensitivity to medications. She uses a pedal exerciser at home,  which helps with mobility but does not significantly alleviate hip pain.   Chief Complaint Reviewed and Verified  Nursing Notes Reviewed and   Verified         Tobacco:  She has never smoked tobacco.    Current Medications[1]      Review of Systems:  Pertinent items are noted in HPI.      Objective:   /58   Pulse (!) 2   Resp 14   Ht 5' 2\" (1.575 m)   Wt 144 lb (65.3 kg)   SpO2 97%   BMI 26.34 kg/m²  Estimated body mass index is 26.34 kg/m² as calculated from the following:    Height as of this encounter: 5' 2\" (1.575 m).    Weight as of this encounter: 144 lb (65.3 kg).  Results  RADIOLOGY  Hip X-ray: Moderate arthritis in both hips (2018)    DIAGNOSTIC  EKG: Normal    PROCEDURE  Suture trimming: Suture tail trimmed to reduce protrusion.     Physical Exam  GENERAL: Alert, cooperative, well developed, no acute distress  HEENT: Normocephalic, normal oropharynx, moist mucous membranes  CHEST: Clear to auscultation bilaterally, No wheezes, rhonchi, or crackles  CARDIOVASCULAR: Normal heart rate and rhythm, S1 and S2 normal without murmurs  ABDOMEN: Soft, non-tender, non-distended, without organomegaly, Normal bowel sounds  EXTREMITIES: No cyanosis or edema  NEUROLOGICAL: Cranial nerves grossly intact, Moves all extremities without gross motor or sensory deficit  SKIN: Skin not infected, healing well      Assessment & Plan:     Assessment & Plan  Thyroid disorder  Thyroid disorder with potential fluctuation affecting heart rate.  - Send levothyroxine prescription to pharmacy.  - Order thyroid function blood test, adjust dose of medication if needed    Anxiety  Anxiety exacerbated by specific situations. Current Lexapro may be insufficient for acute episodes.  - Continue Lexapro at 15 mg daily.  - Consider propranolol for situational anxiety.  - Provided information on propranolol, including pros, cons, and side effects.    Arthritis of hip  Chronic left hip arthritis likely progressed to severe, limiting  mobility. She declines joint injections or NSAIDs.  - Consider use of 4% lidocaine patches for pain management.    Suture irritation  Suture irritation from procedure two months ago, no infection present.  - Trim suture to reduce irritation.    Recording duration: 25 minutes        No follow-ups on file.        Mary Chavez PA-C, 7/24/2025, 5:00 PM             [1]   Current Outpatient Medications   Medication Sig Dispense Refill    levothyroxine 50 MCG Oral Tab Take 1 tablet (50 mcg total) by mouth before breakfast. 90 tablet 3    escitalopram 10 MG Oral Tab Take one and one half tablets once daily. 135 tablet 1    metoprolol succinate ER 25 MG Oral Tablet 24 Hr Take 1 tablet (25 mg total) by mouth daily. 90 tablet 1    mupirocin 2 % External Cream Use bid (Patient not taking: Reported on 7/15/2025) 15 g 1    losartan 25 MG Oral Tab Take 1 tablet (25 mg total) by mouth in the morning.      Rivaroxaban (XARELTO) 20 MG Oral Tab Take 1 tablet (20 mg total) by mouth daily. 90 tablet 3    Probiotic Product (PROBIOTIC DAILY OR) Take by mouth in the morning.      Multiple Vitamin (MULTI-VITAMIN DAILY) Oral Tab Take by mouth in the morning.      Misc Natural Products (LUTEIN 20 OR) Take by mouth in the morning.      Ascorbic Acid (SUPER C COMPLEX OR) Take by mouth in the morning.

## 2025-07-24 NOTE — PROGRESS NOTES
The following individual(s) verbally consented to be recorded using ambient AI listening technology and understand that they can each withdraw their consent to this listening technology at any point by asking the clinician to turn off or pause the recording:    Patient name: Bella Kirby  Additional names:

## 2025-08-02 DIAGNOSIS — G45.9 TIA (TRANSIENT ISCHEMIC ATTACK): ICD-10-CM

## 2025-08-04 RX ORDER — ASPIRIN 81 MG/1
81 TABLET, CHEWABLE ORAL DAILY
Qty: 90 TABLET | Refills: 3 | Status: SHIPPED | OUTPATIENT
Start: 2025-08-04

## 2025-08-05 ENCOUNTER — MED MANAGEMENT (OUTPATIENT)
Age: OVER 89
End: 2025-08-05

## (undated) DIAGNOSIS — F41.9 ANXIETY: ICD-10-CM

## (undated) DIAGNOSIS — F32.5 MAJOR DEPRESSIVE DISORDER IN REMISSION, UNSPECIFIED WHETHER RECURRENT (HCC): ICD-10-CM

## (undated) NOTE — Clinical Note
Spoke with pt today for MARISA--please see notes.  Pt confirms 7/08/2024 TCM/MARISA appt with you. Pt calling Dr. Avalos's office today for 4-6 week f/u appt--declines appt assist--now has neuro f/u 7/31/24--thank you.   Future Appointments 7/8/2024   2:45 PM    Avery Elias DO          [facility]              [facility] [address] 7/31/2024  11:45 AM   Mackenzie Beltran DO         [facility]            [facility]

## (undated) NOTE — LETTER
04/24/19        [address]  [address] [address]      Dear Pedro Luis Ramos,    [facility] records indicate that you have outstanding lab work and or testing that was ordered for you and has not yet been completed:  Orders Placed This Encounter

## (undated) NOTE — ED AVS SNAPSHOT
Andi Payton   MRN: [id number]    Department:  [facility] Emergency Department   Date of Visit:  10/6/2019           Disclosure     Insurance plans vary and the physician(s) referred by the ER may not be covered by your plan.  Please contact your tell this physician (or your personal doctor if your instructions are to return to your personal doctor) about any new or lasting problems. The primary care or specialist physician will see patients referred from the [facility] Emergency Department.  Gatito Sykes

## (undated) NOTE — ED AVS SNAPSHOT
Clarence Emelia   MRN: [id number]    Department:  [facility] Emergency Department   Date of Visit:  2/20/2019           Disclosure     Insurance plans vary and the physician(s) referred by the ER may not be covered by your plan.  Please contact your tell this physician (or your personal doctor if your instructions are to return to your personal doctor) about any new or lasting problems. The primary care or specialist physician will see patients referred from the [facility] Emergency Department.  Elizabeth Delong